# Patient Record
Sex: FEMALE | Race: WHITE | NOT HISPANIC OR LATINO | Employment: FULL TIME | URBAN - NONMETROPOLITAN AREA
[De-identification: names, ages, dates, MRNs, and addresses within clinical notes are randomized per-mention and may not be internally consistent; named-entity substitution may affect disease eponyms.]

---

## 2017-01-04 ENCOUNTER — AMBULATORY - GICH (OUTPATIENT)
Dept: FAMILY MEDICINE | Facility: OTHER | Age: 47
End: 2017-01-04

## 2017-02-27 ENCOUNTER — AMBULATORY - GICH (OUTPATIENT)
Dept: SCHEDULING | Facility: OTHER | Age: 47
End: 2017-02-27

## 2017-04-03 ENCOUNTER — COMMUNICATION - GICH (OUTPATIENT)
Dept: FAMILY MEDICINE | Facility: OTHER | Age: 47
End: 2017-04-03

## 2017-04-03 DIAGNOSIS — E78.2 MIXED HYPERLIPIDEMIA: ICD-10-CM

## 2017-04-25 ENCOUNTER — AMBULATORY - GICH (OUTPATIENT)
Dept: SCHEDULING | Facility: OTHER | Age: 47
End: 2017-04-25

## 2017-04-25 ENCOUNTER — AMBULATORY - GICH (OUTPATIENT)
Dept: FAMILY MEDICINE | Facility: OTHER | Age: 47
End: 2017-04-25

## 2017-04-25 ENCOUNTER — HISTORY (OUTPATIENT)
Dept: FAMILY MEDICINE | Facility: OTHER | Age: 47
End: 2017-04-25

## 2017-04-25 DIAGNOSIS — E06.3 AUTOIMMUNE THYROIDITIS: ICD-10-CM

## 2017-04-25 DIAGNOSIS — Z12.31 ENCOUNTER FOR SCREENING MAMMOGRAM FOR MALIGNANT NEOPLASM OF BREAST: ICD-10-CM

## 2017-04-25 DIAGNOSIS — E10.9 TYPE 1 DIABETES MELLITUS WITHOUT COMPLICATIONS (H): ICD-10-CM

## 2017-04-25 DIAGNOSIS — F34.1 DYSTHYMIC DISORDER: ICD-10-CM

## 2017-04-25 DIAGNOSIS — E78.2 MIXED HYPERLIPIDEMIA: ICD-10-CM

## 2017-04-25 DIAGNOSIS — D50.9 IRON DEFICIENCY ANEMIA: ICD-10-CM

## 2017-04-25 DIAGNOSIS — Z23 ENCOUNTER FOR IMMUNIZATION: ICD-10-CM

## 2017-04-25 LAB
A/G RATIO - HISTORICAL: 2.2 (ref 1–2)
ABSOLUTE BASOPHILS - HISTORICAL: 0.1 THOU/CU MM
ABSOLUTE EOSINOPHILS - HISTORICAL: 0.4 THOU/CU MM
ABSOLUTE LYMPHOCYTES - HISTORICAL: 1.7 THOU/CU MM (ref 0.9–2.9)
ABSOLUTE MONOCYTES - HISTORICAL: 0.4 THOU/CU MM
ABSOLUTE NEUTROPHILS - HISTORICAL: 2.9 THOU/CU MM (ref 1.7–7)
ALBUMIN SERPL-MCNC: 4 G/DL (ref 3.5–5.7)
ALP SERPL-CCNC: 52 IU/L (ref 34–104)
ALT (SGPT) - HISTORICAL: 13 IU/L (ref 7–52)
ANION GAP - HISTORICAL: 8 (ref 5–18)
AST SERPL-CCNC: 15 IU/L (ref 13–39)
BASOPHILS # BLD AUTO: 1.8 %
BILIRUB SERPL-MCNC: 0.5 MG/DL (ref 0.3–1)
BUN SERPL-MCNC: 11 MG/DL (ref 7–25)
BUN/CREAT RATIO - HISTORICAL: 15
CALCIUM SERPL-MCNC: 9 MG/DL (ref 8.6–10.3)
CHLORIDE SERPLBLD-SCNC: 102 MMOL/L (ref 98–107)
CO2 SERPL-SCNC: 27 MMOL/L (ref 21–31)
CREAT SERPL-MCNC: 0.74 MG/DL (ref 0.7–1.3)
EOSINOPHIL NFR BLD AUTO: 6.9 %
ERYTHROCYTE [DISTWIDTH] IN BLOOD BY AUTOMATED COUNT: 11.3 % (ref 11.5–15.5)
GFR IF NOT AFRICAN AMERICAN - HISTORICAL: >60 ML/MIN/1.73M2
GLOBULIN - HISTORICAL: 1.8 G/DL (ref 2–3.7)
GLUCOSE SERPL-MCNC: 168 MG/DL (ref 70–105)
HCT VFR BLD AUTO: 37.3 % (ref 33–51)
HEMOGLOBIN: 12.9 G/DL (ref 12–16)
LYMPHOCYTES NFR BLD AUTO: 30.7 % (ref 20–44)
MCH RBC QN AUTO: 33.1 PG (ref 26–34)
MCHC RBC AUTO-ENTMCNC: 34.6 G/DL (ref 32–36)
MCV RBC AUTO: 96 FL (ref 80–100)
MONOCYTES NFR BLD AUTO: 7.6 %
NEUTROPHILS NFR BLD AUTO: 53.1 % (ref 42–72)
PLATELET # BLD AUTO: 214 THOU/CU MM (ref 140–440)
PMV BLD: 7.4 FL (ref 6.5–11)
POTASSIUM SERPL-SCNC: 4 MMOL/L (ref 3.5–5.1)
PROT SERPL-MCNC: 5.8 G/DL (ref 6.4–8.9)
RED BLOOD COUNT - HISTORICAL: 3.9 MIL/CU MM (ref 4–5.2)
SODIUM SERPL-SCNC: 137 MMOL/L (ref 133–143)
WHITE BLOOD COUNT - HISTORICAL: 5.5 THOU/CU MM (ref 4.5–11)

## 2017-04-26 ENCOUNTER — AMBULATORY - GICH (OUTPATIENT)
Dept: SCHEDULING | Facility: OTHER | Age: 47
End: 2017-04-26

## 2017-04-26 LAB
ALB RAND URINE - HISTORICAL: 6.2 MG/L
CREATININE, URINE - HISTORICAL: 0.61 G/L
MICROALBUMIN, RAND UR - HISTORICAL: 10.2 MG/G CREAT

## 2017-04-28 ENCOUNTER — AMBULATORY - GICH (OUTPATIENT)
Dept: FAMILY MEDICINE | Facility: OTHER | Age: 47
End: 2017-04-28

## 2017-04-28 LAB
ESTIMATED AVERAGE GLUCOSE: NORMAL MG/DL
HEMOGLOBIN A1C MONITORING (POCT) - HISTORICAL: NORMAL % (ref 4–6.2)

## 2017-05-16 ENCOUNTER — HOSPITAL ENCOUNTER (OUTPATIENT)
Dept: RADIOLOGY | Facility: OTHER | Age: 47
End: 2017-05-16
Attending: FAMILY MEDICINE

## 2017-05-16 ENCOUNTER — HISTORY (OUTPATIENT)
Dept: RADIOLOGY | Facility: OTHER | Age: 47
End: 2017-05-16

## 2017-05-16 DIAGNOSIS — Z12.31 ENCOUNTER FOR SCREENING MAMMOGRAM FOR MALIGNANT NEOPLASM OF BREAST: ICD-10-CM

## 2017-05-17 ENCOUNTER — COMMUNICATION - GICH (OUTPATIENT)
Dept: FAMILY MEDICINE | Facility: OTHER | Age: 47
End: 2017-05-17

## 2017-05-17 DIAGNOSIS — E10.9 TYPE 1 DIABETES MELLITUS WITHOUT COMPLICATIONS (H): ICD-10-CM

## 2017-05-18 ENCOUNTER — AMBULATORY - GICH (OUTPATIENT)
Dept: SCHEDULING | Facility: OTHER | Age: 47
End: 2017-05-18

## 2017-05-25 ENCOUNTER — COMMUNICATION - GICH (OUTPATIENT)
Dept: FAMILY MEDICINE | Facility: OTHER | Age: 47
End: 2017-05-25

## 2017-05-25 DIAGNOSIS — E10.9 TYPE 1 DIABETES MELLITUS WITHOUT COMPLICATIONS (H): ICD-10-CM

## 2017-06-20 ENCOUNTER — AMBULATORY - GICH (OUTPATIENT)
Dept: SCHEDULING | Facility: OTHER | Age: 47
End: 2017-06-20

## 2017-10-26 ENCOUNTER — OFFICE VISIT - GICH (OUTPATIENT)
Dept: FAMILY MEDICINE | Facility: OTHER | Age: 47
End: 2017-10-26

## 2017-10-26 ENCOUNTER — HISTORY (OUTPATIENT)
Dept: FAMILY MEDICINE | Facility: OTHER | Age: 47
End: 2017-10-26

## 2017-10-26 DIAGNOSIS — E10.9 TYPE 1 DIABETES MELLITUS WITHOUT COMPLICATIONS (H): ICD-10-CM

## 2017-10-26 DIAGNOSIS — E78.00 PURE HYPERCHOLESTEROLEMIA: ICD-10-CM

## 2017-10-26 DIAGNOSIS — E06.3 AUTOIMMUNE THYROIDITIS: ICD-10-CM

## 2017-10-26 DIAGNOSIS — R30.0 DYSURIA: ICD-10-CM

## 2017-10-26 LAB
BACTERIA URINE: ABNORMAL BACTERIA/HPF
BILIRUB UR QL: NEGATIVE
CLARITY, URINE: ABNORMAL CLARITY
COLOR UR: YELLOW COLOR
EPITHELIAL CELLS: ABNORMAL EPI/HPF
GLUCOSE URINE: NEGATIVE MG/DL
KETONES UR QL: NEGATIVE MG/DL
LEUKOCYTE ESTERASE URINE: ABNORMAL
NITRITE UR QL STRIP: POSITIVE
OCCULT BLOOD,URINE - HISTORICAL: ABNORMAL
PH UR: 6 [PH]
PROTEIN QUALITATIVE,URINE - HISTORICAL: NEGATIVE MG/DL
RBC - HISTORICAL: ABNORMAL /HPF
RENAL EPITHELIAL CELLS - HISTORICAL: ABNORMAL
SP GR UR STRIP: 1.01
UROBILINOGEN,QUALITATIVE - HISTORICAL: NORMAL EU/DL
WBC - HISTORICAL: ABNORMAL /HPF

## 2017-11-03 ENCOUNTER — COMMUNICATION - GICH (OUTPATIENT)
Dept: FAMILY MEDICINE | Facility: OTHER | Age: 47
End: 2017-11-03

## 2017-11-03 DIAGNOSIS — F32.9 MAJOR DEPRESSIVE DISORDER, SINGLE EPISODE: ICD-10-CM

## 2017-12-04 ENCOUNTER — COMMUNICATION - GICH (OUTPATIENT)
Dept: FAMILY MEDICINE | Facility: OTHER | Age: 47
End: 2017-12-04

## 2017-12-04 DIAGNOSIS — E03.9 HYPOTHYROIDISM: ICD-10-CM

## 2017-12-28 NOTE — PATIENT INSTRUCTIONS
Patient Information     Patient Name MRN Sex Floresita Florentino [id number] Female 1970      Patient Instructions by Mary Denson MD at 10/26/2017 10:00 AM     Author:  Mary Denson MD  Service:  (none) Author Type:  Physician     Filed:  10/26/2017 11:08 AM  Encounter Date:  10/26/2017 Status:  Addendum     :  Mary Denson MD (Physician)        Related Notes: Original Note by Mary Denson MD (Physician) filed at 10/26/2017 11:04 AM            1.  Bactrim antibiotic  2.  Drink plenty of fluid  3.  Stop simvastatin;  Recheck lipids in a month  Diabetic labs in a month

## 2017-12-28 NOTE — PROGRESS NOTES
"Patient Information     Patient Name MRN Sex Floresita Florentino [id number] Female 1970      Progress Notes by Mary Denson MD at 10/26/2017 10:00 AM     Author:  Mary Denson MD Service:  (none) Author Type:  Physician     Filed:  10/26/2017  1:02 PM Encounter Date:  10/26/2017 Status:  Signed     :  Mary Denson MD (Physician)            Nursing Notes:   Madhuri Rice  10/26/2017 10:57 AM  Signed  Patient presents for UTI symptoms of frequency and urgency.   Madhuri Rice LPN........................10/26/2017  10:13 AM         SUBJECTIVE: Floresita Hui is a 47 y.o. female who complains of urinary frequency, urgency and dysuria x  2 days, without flank pain, fever, chills, or abnormal vaginal discharge or bleeding.     Allergies      Allergen   Reactions     Codeine  *Unknown     Loses consciousness        /70  Pulse 68  Temp 97  F (36.1  C) (Tympanic)   Ht 1.676 m (5' 6\")  Wt 73 kg (161 lb)  Breastfeeding? No  BMI 25.99 kg/m2    OBJECTIVE: Appears well, in no apparent distress.  The abdomen is soft without tenderness, guarding, mass, rebound or organomegaly. No CVA tenderness or inguinal adenopathy noted.      Results for orders placed or performed in visit on 10/26/17      URINALYSIS W REFLEX MICROSCOPIC IF POSITIVE      Result  Value Ref Range    COLOR                     Yellow Yellow Color    CLARITY                   Slightly Cloudy (A) Clear Clarity    SPECIFIC GRAVITY,URINE    1.015 1.010, 1.015, 1.020, 1.025                    PH,URINE                  6.0 6.0, 7.0, 8.0, 5.5, 6.5, 7.5, 8.5                    UROBILINOGEN,QUALITATIVE  Normal Normal EU/dl    PROTEIN, URINE Negative Negative mg/dL    GLUCOSE, URINE Negative Negative mg/dL    KETONES,URINE             Negative Negative mg/dL    BILIRUBIN,URINE           Negative Negative                    OCCULT BLOOD,URINE        Moderate (A) Negative              "       NITRITE                   Positive (A) Negative                    LEUKOCYTE ESTERASE        Moderate (A) Negative                   URINALYSIS MICROSCOPIC      Result  Value Ref Range    RBC None Seen 0-2, None Seen /HPF    WBC 11-25 (A) 0-2, 3-5, None Seen /HPF    BACTERIA                  Many (A) None Seen, Rare, Occasional, Few Bacteria/HPF    EPITHELIAL CELLS          Moderate (A) None Seen, Few Epi/HPF    RENAL EPITHELIAL CELLS Few None Seen, Few       ASSESSMENT: UTI uncomplicated without evidence of pyelonephritis    PLAN: Treatment per orders - also push fluids, may use Pyridium OTC prn. Call or return to clinic prn if these symptoms worsen or fail to improve as anticipated.  1.  Bactrim antibiotic  2.  Drink plenty of fluid  3.  Stop simvastatin;  Recheck lipids in a month  Diabetic labs in a month

## 2017-12-28 NOTE — TELEPHONE ENCOUNTER
Patient Information     Patient Name MRN Floresita Nazario [id number] Female 1970      Telephone Encounter by Carolynn Vazquez RN at 2017  9:01 AM     Author:  Carolynn Vazquez RN Service:  (none) Author Type:  NURS- Registered Nurse     Filed:  2017  9:10 AM Encounter Date:  11/3/2017 Status:  Signed     :  Carolynn Vazquez RN (NURS- Registered Nurse)            Depression-in adults 18 and over  SSRI    Office visit in the past 12 months or as indicated in chart.  Should have clinic visit 1-2 months after initial prescription.    Last visit with JOSE R BLAKE was on: 10/26/2017 in [facility]  Next visit with JOSE R BLAKE is on: No future appointment listed with this provider  Next visit with Family Practice is on: No future appointment listed in this department    Max refills 12 months from last office visit or per providers notes.    Patient is due for medication management appointment. Limited refill provided at this time. [employer] message and/or letter sent for reminder to patient. Prescription refilled per RN Medication Refill Policy.................... Carolynn Vazquez RN ....................  2017   9:09 AM

## 2017-12-30 NOTE — NURSING NOTE
Patient Information     Patient Name MRN Floresita Nazario [id number] Female 1970      Nursing Note by Madhuri Rice at 10/26/2017 10:00 AM     Author:  Madhuri Rice Service:  (none) Author Type:  (none)     Filed:  10/26/2017 10:57 AM Encounter Date:  10/26/2017 Status:  Signed     :  Madhuri Rice            Patient presents for UTI symptoms of frequency and urgency.   Madhuri Rice LPN........................10/26/2017  10:13 AM

## 2018-01-04 NOTE — NURSING NOTE
Patient Information     Patient Name MRN Sex Floresiat Florentino [id number] Female 1970      Nursing Note by Latrice Stringer at 2017 12:45 PM     Author:  Latrice Stringer  Service:  (none) Author Type:  (none)     Filed:  2017  2:09 PM  Encounter Date:  2017 Status:  Addendum     :  Vanita Troy        Related Notes: Original Note by Latrice Stringer filed at 2017  1:10 PM            This patient presents today for a Preoperative exam for this procedure: right eye Cataract surgery   Date of Surgery: 17 & 17   Surgeon:  Dr. Fink  Facility:  [facility]  Fax:  [phone]  Latrice Stringer LPN ....................  2017   12:51 PM

## 2018-01-04 NOTE — TELEPHONE ENCOUNTER
Patient Information     Patient Name MRN Floresita Nazario [id number] Female 1970      Telephone Encounter by Crystal Balderas RN at 2017  9:21 AM     Author:  Crystal Balderas RN Service:  (none) Author Type:  (none)     Filed:  2017  9:46 AM Encounter Date:  4/3/2017 Status:  Signed     :  Crystal Balderas RN (NURS- Registered Nurse)            Statins    Office visit in the past 12 months.    Last visit with JOSE R BLAKE was on: 2016 in [facility]  Next visit with JOSE R BLAKE is on: No future appointment listed with this provider  Next visit with Family Practice is on: No future appointment listed in this department    Lab testing requirements:  Lipids annually.  Repeat lipids 6-8 weeks after dosage or drug change.    Last Lipids:  Chol: 238    2016  T    2016  HDL:   68    2016  LDL:  159    2016  LDL DIRECT:  No results found in past 5 years    .    Concommitant use of fibrates and statins-If it is an addition to the medication list, review note and/or discuss with provider.  If already on medication list, refill.    Max refills 12 months from last office visit.      Prescription refilled per RN Medication Refill Policy.................... Crystal Balderas ....................  2017   9:21 AM

## 2018-01-04 NOTE — PROGRESS NOTES
Patient Information     Patient Name MRN Sex Floresita Florentino [id number] Female 1970      Progress Notes by Mary Denson MD at 2017 12:45 PM     Author:  Mary Denson MD Service:  (none) Author Type:  Physician     Filed:  2017  6:23 PM Encounter Date:  2017 Status:  Signed     :  Mary Denson MD (Physician)            .

## 2018-01-04 NOTE — H&P
Patient Information     Patient Name MRN Floresita Nazario [id number] Female 1970      H&P by Mary Denson MD at 2017 12:45 PM     Author:  Mary Denson MD Service:  (none) Author Type:  Physician     Filed:  2017  6:23 PM Encounter Date:  2017 Status:  Signed     :  Mary Denson MD (Physician)            Nursing Notes:   Latrice Stringer  2017  1:10 PM  Addendum  This patient presents today for a Preoperative exam for this procedure: right eye Cataract surgery   Date of Surgery: 17 & 17   Surgeon:  Dr. Fink  Facility:  [facility]  Fax:  [phone]  Latrice Stringer LPN ....................  2017   12:51 PM        Vanita Troy  2017  2:10 PM  Signed  This patient presents today for a Preoperative exam for this procedure: Left eye cataract surgery   Date of Surgery: 17 & 17   Surgeon:  Dr. Fink  [facility]   Fax:  [phone]          PREOPERATIVE Anesthesia Medical Evaluation  Date of Exam: 2017    Nursing Notes:   Latrice Stringer  2017  1:10 PM  Addendum  This patient presents today for a Preoperative exam for this procedure: right eye Cataract surgery   Date of Surgery: 17 & 17   Surgeon:  Dr. Fink  Facility:  [facility]  Fax:  [phone]  Latrice Stringer LPN ....................  2017   12:51 PM        Vanita Troy  2017  2:10 PM  Signed  This patient presents today for a Preoperative exam for this procedure: Left eye cataract surgery   Date of Surgery: 17 & 17   Surgeon:  Dr. Fink  [facility]   Fax:  [phone]           HPI:  Patient is here at the request of Dr Fink prior to undergoing bilateral  Cataract surgery on 17 and 17 at two separate facilitie noted above.  Patient is unable to work secondary to her cataracts. She understands the risks of cataract surgery and wishes to proceed.      Mixed hyperlipidemia       recent lipid panel on March 14, 2017 showed a total cholesterol 210  triglycerides 65 and LDL 96l;  She takes zocor 20 mg at hs. she denies any chest pain palpitations shortness of breath. No significant myalgias        Type 1 diabetes mellitus without complication (HCC)    Patient is treated on insulin pump.  She takes a BASA a day .   She had an A1c drawn and [place] and was 7.2  With a creatinine of 0.68 on March 14, 2017 she denies any excessive thirst hunger or urination. Denies any troubles with her hearing. Visual problems secondary to cataract as noted above. Denies any numbness or tingling in her extremities.        Hashimoto's thyroiditis   TSH checked on 3/14/17 and was within normal limits;  She takes synthroid 100 mcg one a day.  She's not having any excessive dry skin. Denies any changes in skin or hair or nails.     DYSTHYMIA, CHRONIC  On Prozac and doing well. Wishes to continue denies any depressive symptoms      Iron deficiency anemia   History of iron deficiency.   Not on iron.  Denies any lightheadedness.      Proposed anesthesia: local with sedation   Anesthesia Complications: none  History of abnormal bleeding : none   History of Blood Transfusions: none      Cards risk factors:     History    Smoking Status      Never Smoker   Smokeless Tobacco      Never Used   ,   LDL CHOLESTEROL (mg/dL)    Date Value   08/16/2016 159 (H)   ,   BP Readings from Last 1 Encounters:    04/25/17 132/84     No early heart disease     CPR: yes  Allergies: Codeine   Latex Allergy: no  Immunization History     Administered  Date(s) Administered     Influenza Virus, Unspecified 10/11/2011, 10/13/2013     Influenza, IIV3 (Age >=3 years) 09/23/2014, 09/29/2015     Influenza, IIV4 (Age >= 3 Years) 09/14/2016         Preoperative Evaluation: Obstructive Sleep Apnea screening    S: Snore -  Do you snore loudly? (louder than talking or loud enough to be heard through closed doors)(NO)  T: Tired - Do you often feel  "tired, fatigued, or sleepy during the daytime?( YES )  O: Observed - Has anyone ever observed you stop breathing during your sleep?( NO)  P: Pressure - Do you have or are you being treated for high blood pressure?(NO)  B: BMI - BMI greater than 35kg/m2?(NO)  A: Age - Age over 50 years old?(NO)  N: Neck - Neck circumference greater than 40 cm?(NO)  G: Gender - Gender: Male?(NO)  Total number of \"YES\" responses:  YES    Scoring: Low risk of SHAHID 0-2  At Risk of SHAHID: >3 High Risk of SHAHID: 5-8      Problem List:   Patient Active Problem List     Diagnosis  Code     DYSTHYMIA, CHRONIC F34.1     Mixed hyperlipidemia E78.2     Microcytic anemia D50.9     Iron deficiency anemia, unspecified D50.9     IUD (intrauterine device) in place Z97.5     Hashimoto's thyroiditis E06.3     Type 1 diabetes mellitus without complication (HC) E10.9      Histories:  Past Medical History:     Diagnosis  Date     Depression      Diabetes mellitus type I (HC) Dx at 10 years old    Uses Medtronic Insulin pump.      DYSTHYMIA, CHRONIC 10/11/2011     Encounter for insertion of mirena IUD 1/17/13    placed by Dr. Carrillo.       Iron deficiency anemia, unspecified 11/16/2012     Microcytic anemia 11/16/2012     Mixed hyperlipidemia 11/16/2012      Past Surgical History:      Procedure  Laterality Date     DILATION AND CURETTAGE      x3       TMJ ARTHOTOMY      hx of TMJ surgery        Social History     Social History        Marital status:       Spouse name: N/A     Number of children:  2     Years of education:  N/A     Occupational History       [employer]     Social History Main Topics          Smoking status:   Never Smoker      Smokeless tobacco:   Never Used      Alcohol use   1.2 oz/week     2 Standard drinks or equivalent per week        Comment: Glass of wine daily       Drug use:   No      Sexual activity:   Yes      Partners:  Male      Other Topics   Concern      Service  No     Blood " Transfusions  No     Caffeine Concern  Yes     2 + caffeine       Occupational Exposure  Yes     works in human resources.       Hobby Hazards  No     Sleep Concern  No     Stress Concern  Yes     Weight Concern  No     Special Diet  Yes     diabetic       Back Care  No     Bike Helmet  Yes     Seat Belt  Yes     Social History Narrative     , moved here from [place].   Sense of humor intact.      works in [employer] as .  [place] - .  2 kids.    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      No routine exercise program.  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    Patient states she is safe in her home.    <       Obstetric History       T0      L2     SAB0   TAB0   Ectopic0   Multiple0   Live Births0      Family History       Problem   Relation Age of Onset     Cancer-breast  Maternal Aunt      Other  Mother      Myesthenia gravis       Other  Mother      Rare kidney disease, on transplant list       Psychiatric illness  Sister      Bipolar       Thyroid Disease  Sister      Other  Sister      Migraines          Current Medications:  Current Outpatient Rx       Medication  Sig Dispense Refill     ACCU-CHEK JESICA PLUS TEST STRP strip USE ONE STRIP TO CHECK GLUCOSE FIVE TIMES DAILY. 500 Each 3     aspirin enteric coated 81 mg tablet Take 1 tablet by mouth once daily with a meal.  0     blood-glucose meter (ACCU-CHEK ADVANTAGE DIABETES) Dispense glucose meter, test strips and lancets covered by the patient insurance. Test 5 times per day. 3 month supply with 4 refills. 1 Device 0     cholecalciferol (VITAMIN D) 1,000 unit tablet Take 1 tablet by mouth once daily.  0     clotrimazole-betamethasone cream (LOTRISONE) 1-0.05 % cream Apply  topically to affected area(s) 2 times daily. Generic okay 15 g 0     cyclobenzaprine (FLEXERIL) 5 mg tablet Take one tablet q hs prn muscle spasm 30 tablet 1     FLUoxetine (PROZAC) 20 mg capsule Take 1 capsule by mouth every  "morning. 90 capsule 4              HUMALOG 100 unit/mL injection USE VIA PUMP 60 mL 0     insulin glargine (LANTUS) 100 unit/mL injection Inject 20 Units subcutaneous before bedtime only if pump fails  10 mL 0     insulin lispro (HUMALOG) 100 unit/mL injection  per pump  10 mL 0     insulin syringe-needle u-100 (B-D INS SYR ULTRAFINE .5CC/29G) 1/2 mL 29 x 1/2\" For administering insulin at home. 1 box 0     levonorgestrel intrauterine device (MIRENA) 20 mcg/24 hr (5 years) IUD Placed May 2013 1 Device 0     levothyroxine (SYNTHROID) 100 mcg tablet Take 1 tablet by mouth before breakfast.   90 tablet 3     simvastatin (ZOCOR) 20 mg tablet TAKE ONE TABLET BY MOUTH ONCE DAILY AT BEDTIME. 90 tablet 1               Medications have been reviewed by me and are current to the best of my knowledge and ability.    Recent use of: aspirin    HABITS:     Health Care Directive or Living Will: no    REVIEW OF SYSTEMS:  Fever/Chills or other infectious symptoms in past month:  (NO)   >10lb weight loss in past two months:  (NO)   PHQ Depression Screen     Over the last 2 weeks, how often have you been bothered by any of the following problems?        ROS: No TIA's or unusual headaches, no dysphagia.  No prolonged cough. No dyspnea or chest pain on exertion.  No abdominal pain, change in bowel habits, black or bloody stools.  No urinary tract symptoms.  No new or unusual musculoskeletal symptoms.  Decreased menses secondary to mirena, monogamous, no abnormal vaginal bleeding, discharge or unexpected pelvic pain. No new breast lumps, breast pain or nipple discharge.    Has Mirena        EXAM:   /84  Pulse 60  Temp 96.9  F (36.1  C) (Tympanic)   Ht 1.665 m (5' 5.55\")  Wt 73 kg (161 lb)  SpO2 98%  BMI 26.34 kg/m2 Body mass index is 26.34 kg/(m^2).  Patient appears well, alert and oriented x 3, pleasant, cooperative.  KEVIN. TM's clear, Oral pharynx with good dentition, without lesion, erythema or exudate. Moist mucous " membranes. Neck supple and free of adenopathy, or masses. No thyromegaly. Lungs are clear, without wheezes, rhonchi or rales. Heart sounds are normal, no murmurs, clicks, gallops or rubs. Abdomen is soft, no tenderness, masses or organomegaly. No CVAT.  Wearing insulin pump. Extremities are without edema. Peripheral pulses are normal. Screening neurological exam is normal without focal findings. Skin is normal without suspicious lesions noted.      DIAGNOSTICS:   1. EKG:  NSR rate 62    2. CXR:  Not needed     3. Labs:     Results for orders placed or performed in visit on 04/25/17      HEMOGLOBIN A1C MONITORING (POCT)      Result  Value Ref Range    HEMOGLOBIN A1C MONITORING (POCT) 7.0 (H) 4.0 - 6.2 %    ESTIMATED AVERAGE GLUCOSE  154 mg/dL   COMP METABOLIC PANEL      Result  Value Ref Range    SODIUM 137 133 - 143 mmol/L    POTASSIUM 4.0 3.5 - 5.1 mmol/L    CHLORIDE 102 98 - 107 mmol/L    CO2,TOTAL 27 21 - 31 mmol/L    ANION GAP 8 5 - 18                    GLUCOSE 168 (H) 70 - 105 mg/dL    CALCIUM 9.0 8.6 - 10.3 mg/dL    BUN 11 7 - 25 mg/dL    CREATININE 0.74 0.70 - 1.30 mg/dL    BUN/CREAT RATIO           15                    GFR if African American >60 >60 ml/min/1.73m2    GFR if not African American >60 >60 ml/min/1.73m2    ALBUMIN 4.0 3.5 - 5.7 g/dL    PROTEIN,TOTAL 5.8 (L) 6.4 - 8.9 g/dL    GLOBULIN                  1.8 (L) 2.0 - 3.7 g/dL    A/G RATIO 2.2 (H) 1.0 - 2.0                    BILIRUBIN,TOTAL 0.5 0.3 - 1.0 mg/dL    ALK PHOSPHATASE 52 34 - 104 IU/L    ALT (SGPT) 13 7 - 52 IU/L    AST (SGOT) 15 13 - 39 IU/L   CBC WITH AUTO DIFFERENTIAL      Result  Value Ref Range    WHITE BLOOD COUNT         5.5 4.5 - 11.0 thou/cu mm    RED BLOOD COUNT           3.90 (L) 4.00 - 5.20 mil/cu mm    HEMOGLOBIN                12.9 12.0 - 16.0 g/dL    HEMATOCRIT                37.3 33.0 - 51.0 %    MCV                       96 80 - 100 fL    MCH                       33.1 26.0 - 34.0pg    MCHC                      34.6  32.0 - 36.0 g/dL    RDW                       11.3 (L) 11.5 - 15.5 %    PLATELET COUNT            214 140 - 440 thou/cu mm    MPV                       7.4 6.5 - 11.0 fL    NEUTROPHILS               53.1 42.0 - 72.0 %    LYMPHOCYTES               30.7 20.0 - 44.0 %    MONOCYTES                 7.6 <12.0 %    EOSINOPHILS               6.9 <8.0 %    BASOPHILS                 1.8 <3.0 %    ABSOLUTE NEUTROPHILS      2.9 1.7 - 7.0 thou/cu mm    ABSOLUTE LYMPHOCYTES      1.7 0.9 - 2.9 thou/cu mm    ABSOLUTE MONOCYTES        0.4 <0.9 thou/cu mm    ABSOLUTE EOSINOPHILS      0.4 <0.5 thou/cu mm    ABSOLUTE BASOPHILS        0.1 <0.3 thou/cu mm       4. Pre-op urine for pregnancy (for 12 yrs and older or menstruating): mirena     IMPRESSION:   Satisfactory Preoperative Anesthesia Medical Evaluation;    For above listed surgery and anesthesia:   Patient is low risk for perioperative complications.    Patient is on chronic pain medications (NO);   Patient is on antiplatlet/anticoagulation (YES)  Other medications that need adjustment perioperatively (NO)        Patient may proceed with surgery with appropriate anesthesia.  Labs/ Ekg to be faxed to surgeon's office.   Patient informed NPO after midnight night prior to surgery, to avoid NSAIDS, ASA, fish oil, and flax seed  over the counter ten days prior to surgery.   If  febrile illness or productive cough, please contact the surgeon's office.       I appreciate in advance compassionate care of this patient by Dr. Fink and  his surgical staff.         Mixed hyperlipidemia   doing well on zocor 20 mg at hs       Hashimoto's thyroiditis  Doing well on synthroid.       DYSTHYMIA, CHRONIC   continue prozac 20 mg       Iron deficiency anemia, unspecified iron deficiency anemia type   resolved      Type 1 diabetes mellitus without complication (HC)   continue insulin pump per endocrinology ;  Given no general anesthesia no need to decrease insulin

## 2018-01-04 NOTE — NURSING NOTE
Patient Information     Patient Name MRN Sex Floresita Florentino [id number] Female 1970      Nursing Note by Vanita Troy at 2017 12:45 PM     Author:  Vanita Troy Service:  (none) Author Type:  (none)     Filed:  2017  2:10 PM Encounter Date:  2017 Status:  Signed     :  Vanita Troy            This patient presents today for a Preoperative exam for this procedure: Left eye cataract surgery   Date of Surgery: 17 & 17   Surgeon:  Dr. Fink  [facility]   Fax:  [phone]

## 2018-01-04 NOTE — PATIENT INSTRUCTIONS
Patient Information     Patient Name MRN Floresita Nazario [id number] Female 1970      Patient Instructions by Mary Denson MD at 2017  1:54 PM     Author:  Mary Denson MD  Service:  (none) Author Type:  Physician     Filed:  2017  1:54 PM  Encounter Date:  2017 Status:  Addendum     :  Mary Denson MD (Physician)        Related Notes: Original Note by Mary Denson MD (Physician) filed at 2017  1:54 PM            RECOMMENDATIONS:   Nothing to eat after midnight night prior to surgery.  Avoid Aspirin,  NSAIDS (nonsteroidal anti-inflammatory medications) like ibuprofen or aleve over the counter ten days prior to surgery.   It is okay to use tylenol with maximum tylenol use 3 grams in 24 hours.  If you have a febrile illness or productive cough, please contact your surgeon's office.      Stop aspirin.    Avoid fish oil    Drops per Dr. Fink    Lid hygiene:  Abhijeet and Abhijeet's , pillow case, throw make up .      Index [place] Related topics   Cataract Surgery   ________________________________________________________________________  KEY POINTS    Cataract surgery is a procedure in which a provider removes a clouded lens from the eye and replaces it with an artificial lens. A cataract is a cloudy lens inside the eye behind the iris (the colored part of the eye). This cloudy area can cause trouble seeing.    Cataract surgery is recommended when a cloudy area in the lens of the eye causes vision problems.    Ask your provider how long it will take to recover and how to take care of yourself at home.    Make sure you know what symptoms or problems you should watch for and what to do if you have them.  ________________________________________________________________________  What is a cataract surgery?   Cataract surgery is a procedure in which a provider removes a clouded lens from the eye and replaces it with an  artificial lens. A cataract is a cloudy area in the lens of the eye. The lens is located inside the eye behind the iris (the colored part of the eye). It helps focus light and images on the lining of the back of your eye. If the lens gets cloudy, it can cause trouble seeing.  When is it used?   Cataract surgery is recommended when a cloudy area in the lens of the eye causes vision problems. The benefit of this procedure is that you can regain clear vision if the rest of your eye is normal.  Ask your healthcare provider about your choices for treatment and the risks.  How do I prepare for this procedure?     Plan for your care and a ride home after the procedure.    You may or may not need to take your regular medicines the day of the procedure. Tell your healthcare provider about all medicines and supplements that you take. Some products may increase your risk of side effects. Ask your healthcare provider if you need to avoid taking any medicine or supplements before the procedure.    Your healthcare provider will tell you when to stop eating and drinking before the procedure. This helps to keep you from vomiting during the procedure.    Do not wear eye makeup on the day of the surgery.    Follow any other instructions your healthcare provider gives you.    Ask any questions you have before the procedure. You should understand what your healthcare provider is going to do. You have the right to make decisions about your healthcare and to give permission for any tests or procedures.  What happens during the procedure?   You will be given a local or general anesthetic to keep you from feeling pain during the operation. A local anesthetic numbs your eye while you remain awake. The local anesthetic can be given to you with drops or ointment or with a shot of medicine behind the eye. General anesthesia relaxes your muscles and you will be asleep. Most surgery is done with local anesthesia and a sedative to help you  relax.  There are different ways to break up the lens:    Phacoemulsification: The provider will make a small cut in your eye and use sound waves (ultrasound) to break the lens into small pieces. The small pieces of the old lens are then removed with a tiny vacuum    Femtosecond laser: The provider uses a special laser to make a small cut in your eye and help break the lens into small pieces. Ultrasound may still be used.    Nuclear expression: The lens is removed in one piece. This approach is used rarely, only if your cataract can't be broken up by phacoemulsification.  After the lens is removed, the provider will put an artificial lens in your eye. The provider may put one or more stitches in your eye to close the cut. You will have a protective shield and a patch over the eye.  What happens after the procedure?   You will be in the recovery area after surgery until you are ready to go home. It's normal to feel itching, sticky eyelids, and mild discomfort for a short time after cataract surgery. After 1 to 2 days, the discomfort should stop. Some fluid discharge is also common.  You can read and watch TV almost right away, but your vision may be blurry at first. You can do simple tasks such as ride in a car, get dressed, cook, and visit friends. You should not drive a car the day of surgery. To protect your eye from injury, cover the eye at all times with sunglasses, glasses, or a special eye shield while your eye is healing.  You need to use eye drops or pills to help healing, prevent infection, or to control the pressure in your eye. Since you may have several different drops to use, be sure you have a written schedule to follow to avoid confusion.  Ask your healthcare provider:    How long it will take to recover    If there are activities you should avoid    How to take care of yourself at home and when you can return to your normal activities    What symptoms or problems you should watch for and what to do  if you have them  Make sure you know when you should come back for a checkup. Keep all appointments for provider visits or tests.  What are the risks of this procedure?   Every procedure or treatment has risks. Some possible risks of this procedure include:    Problems with anesthesia    Infection, bleeding, or blood clots    Drooping eyelid    Double vision    Glaucoma (damage to the optic nerve usually caused by high pressure inside the eye)    Retinal tear or detachment    Need for additional surgery    Decreased or loss of vision (rare)    A cloudy film that may form on the covering of the plastic lens implant. This problem is easily fixed with a quick, painless laser procedure that is usually done in your provider s office.  Ask your healthcare provider how these risks apply to you. Be sure to discuss any other questions or concerns that you may have.  Reviewed for medical accuracy by faculty at the [facility] at [facility]. Web site: [email or web address]  Developed by [employer].  Adult Advisor 2016.3 published by [employer].  Last modified: 2015-09-22  Last reviewed: 2015-09-21  This content is reviewed periodically and is subject to change as new health information becomes available. The information is intended to inform and educate and is not a replacement for medical evaluation, advice, diagnosis or treatment by a healthcare professional.  References   Adult Advisor 2016.3 Index    Copyright   2016 [employer], a division of [employer] All rights reserved.

## 2018-01-05 NOTE — TELEPHONE ENCOUNTER
Patient Information     Patient Name MRN Floresita Nazario [id number] Female 1970      Telephone Encounter by Madhuri Rice at 2017 11:25 AM     Author:  Madhuri Rice Service:  (none) Author Type:  (none)     Filed:  2017 11:36 AM Encounter Date:  2017 Status:  Signed     :  Madhuri Rice            Patient states she has been on Humalog for 15 years and has never tried anything else.  She is willing to try novolog please send order to pharmacy  Novolog added and set up for fill.   D/C'd humalog    Madhuri Rice LPN........................2017  11:26 AM

## 2018-01-05 NOTE — TELEPHONE ENCOUNTER
Patient Information     Patient Name MRN Floresita Nazario [id number] Female 1970      Telephone Encounter by Madhuri Rice at 2017  8:47 AM     Author:  Madhuri Rice Service:  (none) Author Type:  (none)     Filed:  2017  8:53 AM Encounter Date:  2017 Status:  Signed     :  Madhuri Rice            Received paperwork that patients humalog is no longer covered under insurance.  She would need to try novolog or requirement : trial and failure of 3 or more in a class with at least 3 alternatives.  2 in a class with 2 alternatives, or 1 in a class with only 1 alternative.   Patient was in a meeting and will call back.   aMdhuri Rice LPN........................2017  8:52 AM

## 2018-01-05 NOTE — TELEPHONE ENCOUNTER
Patient Information     Patient Name MRN Floresita Nazario [id number] Female 1970      Telephone Encounter by Crystal Balderas RN at 2017  4:29 PM     Author:  Crystal Balderas RN Service:  (none) Author Type:  (none)     Filed:  2017  4:31 PM Encounter Date:  2017 Status:  Signed     :  Crystal Balderas RN (NURS- Registered Nurse)            Diabetes    Office visit in the past 12 months or per provider note.    Last visit with JOSE R BLAKE was on: 2016 in [facility]  Next visit with JOSE R BLAKE is on: No future appointment listed with this provider  Next visit with Family Practice is on: No future appointment listed in this department    Lab test requirements:  HgbA1c annually or per provider note.  HEMOGLOBIN A1C MONITORING (POCT)      Date   Value   2017        Comment:      Testing done at other site by other provider  This is a corrected result. Previously reported as 7.0 % on 2017 at 1357 CDT    2013   7.1 % NGSP (H)       Max refill for 12 months from last office visit or per provider note.    Prescription refilled per RN Medication Refill Policy.................... Crystal Balderas ....................  2017   4:30 PM

## 2018-01-05 NOTE — PROGRESS NOTES
Patient Information     Patient Name MRN Sex Floresita Florentino [id number] Female 1970      Progress Notes by Melissa Ruelas at 2017  7:25 AM     Author:  Melissa Ruelas Service:  (none) Author Type:  (none)     Filed:  2017  7:25 AM Date of Service:  2017  7:25 AM Status:  Signed     :  Melissa Ruelas            Falls Risk Criteria:    Age 65 and older or under age 4        Sensory deficits    Poor vision    Use of ambulatory aides    Impaired judgment    Unable to walk independently    Meets High Risk criteria for falls:  no

## 2018-01-25 VITALS
SYSTOLIC BLOOD PRESSURE: 122 MMHG | WEIGHT: 161 LBS | TEMPERATURE: 97 F | HEIGHT: 66 IN | DIASTOLIC BLOOD PRESSURE: 70 MMHG | BODY MASS INDEX: 25.88 KG/M2 | HEART RATE: 68 BPM

## 2018-01-25 VITALS
HEIGHT: 66 IN | BODY MASS INDEX: 25.88 KG/M2 | HEART RATE: 60 BPM | SYSTOLIC BLOOD PRESSURE: 132 MMHG | DIASTOLIC BLOOD PRESSURE: 84 MMHG | WEIGHT: 161 LBS | TEMPERATURE: 96.9 F | OXYGEN SATURATION: 98 %

## 2018-02-08 ENCOUNTER — DOCUMENTATION ONLY (OUTPATIENT)
Dept: FAMILY MEDICINE | Facility: OTHER | Age: 48
End: 2018-02-08

## 2018-02-08 RX ORDER — SIMVASTATIN 20 MG
20 TABLET ORAL AT BEDTIME
COMMUNITY
Start: 2017-04-25 | End: 2018-03-02

## 2018-02-08 RX ORDER — TRIAMCINOLONE ACETONIDE 1 MG/G
CREAM TOPICAL 2 TIMES DAILY PRN
COMMUNITY
Start: 2014-05-05 | End: 2022-05-13

## 2018-02-08 RX ORDER — LEVOTHYROXINE SODIUM 100 UG/1
1 TABLET ORAL
COMMUNITY
Start: 2017-12-05 | End: 2018-03-02

## 2018-02-08 RX ORDER — INSULIN GLARGINE 100 [IU]/ML
20 INJECTION, SOLUTION SUBCUTANEOUS
COMMUNITY
Start: 2014-07-02 | End: 2018-03-02

## 2018-02-08 RX ORDER — BLOOD-GLUCOSE METER
EACH MISCELLANEOUS
COMMUNITY
Start: 2014-06-25

## 2018-02-08 RX ORDER — SYRINGE-NEEDLE,INSULIN,0.5 ML 27GX1/2"
SYRINGE, EMPTY DISPOSABLE MISCELLANEOUS
COMMUNITY
Start: 2016-01-15

## 2018-02-08 RX ORDER — ASPIRIN 81 MG/1
81 TABLET ORAL
COMMUNITY
Start: 2014-02-17

## 2018-02-12 NOTE — TELEPHONE ENCOUNTER
Patient Information     Patient Name MRN Sex Floresita Florentino [id number] Female 1970      Telephone Encounter by Francheska Dewitt RN at 2017  9:57 AM     Author:  Francheska Dewitt RN Service:  (none) Author Type:  NURS- Registered Nurse     Filed:  2017 10:00 AM Encounter Date:  2017 Status:  Signed     :  Francheska Dewitt RN (NURS- Registered Nurse)            [facility] and pt request a refill Rx for levothyroxine (Synthroid).    Hypothyroidism    Office visit in the past 12 months or per provider note.    Last visit with JOSE R BLAKE was on: 10/26/2017 in [facility]  Next visit with JOSE R BLAKE is on: No future appointment listed with this provider  Next visit with Family Practice is on: No future appointment listed in this department    Lab testing requirements:  TSH annually  TSH (uIU/mL)    Date Value   2016 3.12       Max refill for 12 months from last office visit or per provider note.    Prescription refilled per RN Medication Refill Policy.................... Francheska Dewitt RN ....................  2017   10:00 AM

## 2018-02-13 ENCOUNTER — TRANSFERRED RECORDS (OUTPATIENT)
Dept: HEALTH INFORMATION MANAGEMENT | Facility: OTHER | Age: 48
End: 2018-02-13

## 2018-03-01 ENCOUNTER — TELEPHONE (OUTPATIENT)
Dept: FAMILY MEDICINE | Facility: OTHER | Age: 48
End: 2018-03-01

## 2018-03-01 DIAGNOSIS — E10.9 TYPE 1 DIABETES MELLITUS WITHOUT COMPLICATION (H): Primary | ICD-10-CM

## 2018-03-01 DIAGNOSIS — E06.3 HASHIMOTO'S THYROIDITIS: ICD-10-CM

## 2018-03-01 NOTE — TELEPHONE ENCOUNTER
Left message to call back....................  3/1/2018   9:46 AM  Madhuri Rice LPN........................3/1/2018  9:46 AM

## 2018-03-01 NOTE — TELEPHONE ENCOUNTER
Patient needs A1C, Creatinine , T4, TSH, LIPID.   Orders had  in old system so did not transfer.   Madhuri Rice LPN........................3/1/2018  10:39 AM

## 2018-03-01 NOTE — TELEPHONE ENCOUNTER
Floresita Hui is scheduled for an outpatient lab appointment tomorrow, 3/2/18 at 8:00. Lab has no orders for this patient. Would Dr. Denson like to place lab orders for Floresita Hill's lab only appointment tomorrow morning?  Thank you

## 2018-03-02 ENCOUNTER — OFFICE VISIT (OUTPATIENT)
Dept: FAMILY MEDICINE | Facility: OTHER | Age: 48
End: 2018-03-02
Attending: FAMILY MEDICINE
Payer: COMMERCIAL

## 2018-03-02 VITALS
DIASTOLIC BLOOD PRESSURE: 74 MMHG | SYSTOLIC BLOOD PRESSURE: 122 MMHG | HEIGHT: 66 IN | WEIGHT: 169 LBS | BODY MASS INDEX: 27.16 KG/M2 | HEART RATE: 68 BPM

## 2018-03-02 DIAGNOSIS — E10.9 TYPE 1 DIABETES MELLITUS WITHOUT COMPLICATION (H): Primary | ICD-10-CM

## 2018-03-02 DIAGNOSIS — Z12.4 CERVICAL CANCER SCREENING: ICD-10-CM

## 2018-03-02 DIAGNOSIS — F34.1 DYSTHYMIC DISORDER: ICD-10-CM

## 2018-03-02 DIAGNOSIS — E06.3 HASHIMOTO'S THYROIDITIS: ICD-10-CM

## 2018-03-02 DIAGNOSIS — Z30.432 ENCOUNTER FOR IUD REMOVAL: ICD-10-CM

## 2018-03-02 DIAGNOSIS — E10.9 TYPE 1 DIABETES MELLITUS WITHOUT COMPLICATION (H): ICD-10-CM

## 2018-03-02 LAB
CHOLEST SERPL-MCNC: 245 MG/DL
CREAT SERPL-MCNC: 0.83 MG/DL (ref 0.6–1.2)
GFR SERPL CREATININE-BSD FRML MDRD: 73 ML/MIN/1.7M2
HBA1C MFR BLD: 6.9 % (ref 4–6)
HDLC SERPL-MCNC: 97 MG/DL (ref 23–92)
LDLC SERPL CALC-MCNC: 138 MG/DL
NONHDLC SERPL-MCNC: 148 MG/DL
T4 FREE SERPL-MCNC: 0.96 NG/DL (ref 0.6–1.6)
TRIGL SERPL-MCNC: 51 MG/DL
TSH SERPL DL<=0.05 MIU/L-ACNC: 4.15 IU/ML (ref 0.34–5.6)

## 2018-03-02 PROCEDURE — G0123 SCREEN CERV/VAG THIN LAYER: HCPCS | Performed by: FAMILY MEDICINE

## 2018-03-02 PROCEDURE — 90732 PPSV23 VACC 2 YRS+ SUBQ/IM: CPT | Performed by: FAMILY MEDICINE

## 2018-03-02 PROCEDURE — 90471 IMMUNIZATION ADMIN: CPT | Performed by: FAMILY MEDICINE

## 2018-03-02 PROCEDURE — 80061 LIPID PANEL: CPT | Performed by: FAMILY MEDICINE

## 2018-03-02 PROCEDURE — 99214 OFFICE O/P EST MOD 30 MIN: CPT | Mod: 25 | Performed by: FAMILY MEDICINE

## 2018-03-02 PROCEDURE — 82565 ASSAY OF CREATININE: CPT | Performed by: FAMILY MEDICINE

## 2018-03-02 PROCEDURE — 87624 HPV HI-RISK TYP POOLED RSLT: CPT | Performed by: FAMILY MEDICINE

## 2018-03-02 PROCEDURE — 84443 ASSAY THYROID STIM HORMONE: CPT | Performed by: FAMILY MEDICINE

## 2018-03-02 PROCEDURE — 58301 REMOVE INTRAUTERINE DEVICE: CPT | Performed by: FAMILY MEDICINE

## 2018-03-02 PROCEDURE — 40001026 ZZHCL STATISTICAL PAP TEST QC: Performed by: FAMILY MEDICINE

## 2018-03-02 PROCEDURE — 84439 ASSAY OF FREE THYROXINE: CPT | Performed by: FAMILY MEDICINE

## 2018-03-02 PROCEDURE — 83036 HEMOGLOBIN GLYCOSYLATED A1C: CPT | Performed by: FAMILY MEDICINE

## 2018-03-02 PROCEDURE — 88142 CYTOPATH C/V THIN LAYER: CPT | Mod: ZL | Performed by: FAMILY MEDICINE

## 2018-03-02 PROCEDURE — 36415 COLL VENOUS BLD VENIPUNCTURE: CPT | Performed by: FAMILY MEDICINE

## 2018-03-02 RX ORDER — SIMVASTATIN 10 MG
10 TABLET ORAL AT BEDTIME
Qty: 90 TABLET | Refills: 3 | Status: SHIPPED | OUTPATIENT
Start: 2018-03-02 | End: 2019-04-10

## 2018-03-02 RX ORDER — INSULIN GLARGINE 100 [IU]/ML
20 INJECTION, SOLUTION SUBCUTANEOUS AT BEDTIME
Qty: 3 VIAL | Refills: 3 | Status: SHIPPED | OUTPATIENT
Start: 2018-03-02 | End: 2022-08-02

## 2018-03-02 RX ORDER — LEVOTHYROXINE SODIUM 100 UG/1
100 TABLET ORAL
Qty: 90 TABLET | Refills: 3 | Status: SHIPPED | OUTPATIENT
Start: 2018-03-02 | End: 2019-04-10

## 2018-03-02 ASSESSMENT — PAIN SCALES - GENERAL: PAINLEVEL: NO PAIN (0)

## 2018-03-02 NOTE — NURSING NOTE
Patient present for follow up Diabetes.   Madhuri Rice LPN........................3/2/2018  12:55 PM

## 2018-03-02 NOTE — LETTER
GROUP THERAPY PROGRESS NOTE    Maegan [place] is participating in [place].      Group time: 15 minutes    Personal goal for participation: goals/unit rules    Goal orientation: community    Group therapy participation: active    Therapeutic interventions reviewed and discussed:     Impression of participation: March 8, 2018      Floresita Hill  [address] [facility] [address]        Dear Ms.Burns Hill,    We are writing to inform you of your test results.    Your test results fall within the expected range(s) or remain unchanged from previous results.  Please continue with current treatment plan.     Specimen Description Cervical Cells  Final   [id number]       Resulted Orders   HPV High Risk Types DNA Cervical   Result Value Ref Range    HPV Source ThinPrep       Comment:      CORRECTED ON 03/06 AT 0730: PREVIOUSLY REPORTED AS [facility]    HPV 16 DNA Negative NEG^Negative    HPV 18 DNA Negative NEG^Negative    Other HR HPV Negative NEG^Negative    Final Diagnosis This patient's sample is negative for HPV DNA.       Comment:      (Note)  METHODOLOGY:  The Roche rogelio 4800 system uses automated extraction,   simultaneous amplification of HPV (L1 region) and beta-globin,    followed by  real time detection of fluorescent labeled HPV and beta   globin using specific oligonucleotide probes . The test specifically   identifies types HPV 16 DNA and HPV 18 DNA while concurrently   detecting the rest of the high risk types (31, 33, 35, 39, 45, 51,   52, 56, 58, 59, 66 or 68).  COMMENTS:  This test is not intended for use as a screening device   for women under age 30 with normal cervical cytology.  Results should   be correlated with cytologic and histologic findings. Close clinical   followup is recommended.      Specimen Description Cervical Cells       Comment:      [id number]       If you have any questions or concerns, please call the clinic at the number listed above.       Sincerely,        JOSE R BLAKE MD

## 2018-03-02 NOTE — PATIENT INSTRUCTIONS
Pap results will be mailed to your home.   Work on following  a mediterranean diet; Use monosaturated fats ( olive , canola and peanut   oil; nuts, avocados), abundance of plant foods which are minimally processed, fish (salmon).  Exercise 30minutes every day  You may have some spotting after IUD pulled.  Menses may regulate to monthly periods, however if heavy or prolonged then would need pelvic ultrasound and then possible endometrial biopsy with Dr. Gomez  Due for mammogram 5/17 17  Try to get 7-8 hours sleep each night.  Rest is important!      Pneumococcal Vaccination  There are 2 pneumococcal vaccinations that protect people against pneumococcal disease.  Pneumococcal disease  Pneumococcal disease is caused by bacteria (Streptococcus pneumoniae). This germ is easily spread when someone with the bacteria coughs, sneezes, laughs, or talks. You can get pneumococcal disease more than once. This is because there are many different types (strains) of the bacteria. Some strains are also resistant to treatment with antibiotics.  There are different kinds of pneumococcal disease, depending on what part of the body is infected. They include:    Pneumonia. Infection in the lungs.    Meningitis. Infection of the covering of the brain and spinal cord.    Otitis media. Infection of the middle ear.    Bacteremia or septicemia. Infection in the blood.  Pneumococcal disease can be life-threatening, especially for people in high-risk groups. Each year, thousands of people die of this disease. Thousands more become seriously ill.  The vaccine    The pneumococcal vaccines are the best way to avoid pneumococcal disease. They are safe and effective. The vaccines are given as shots (injections). This can be done at your healthcare provider's office or a health clinic. Drugstores, senior centers, and workplaces often offer vaccinations, too. If you have questions, ask your healthcare provider.  The pneumococcal vaccines are  recommended for:    Persons 65 and older    Infants    People with chronic health problems (such as diabetes, chronic lung or heart disease, liver disease)    People who have a cochlear implant    People who have weakened immune systems    People who live in nursing homes or other long-term care facilities    People who smoke or have asthma  They are given:    In a 4-dose series in infants    One time in adults; some people need a second dose of one of the vaccines  Your healthcare provider can tell you more about the vaccines, whether you should get them, and the number of shots you should get.  Date Last Reviewed: 11/1/2016 2000-2017 "[employer]". [address] [place] [address]. All rights reserved. This information is not intended as a substitute for professional medical care. Always follow your healthcare professional's instructions.        Heavy Menstrual Bleeding    Heavy menstrual bleeding means that your periods are heavier or longer than usual. You may soak through a pad or tampon every 1 to 3 hours on the heaviest days of your period. You may also pass large, dark clots. And your periods may last longer than 7 days.  If you have heavy periods often, this can cause a problem called anemia. With anemia, your red blood cell count is too low. Red blood cells are needed because they help carry oxygen throughout your body. Severe anemia may cause you to look pale and feel weak or tired. You might also become short of breath easily.  There are many possible causes of heavy menstrual bleeding. Hormonal imbalance is the most common cause. Having benign growths in your uterus, such as fibroids or polyps, is another cause. Taking certain medicines or having certain health problems or bleeding disorders are also causes.  To treat heavy menstrual bleeding, medicines are often tried first. If these don t help, further testing and treatments will likely be needed.  Home care  Medicines  If  you re prescribed medicines, be sure to take them as directed.    To help control heavy bleeding, any of the following may be used:    Hormone therapy (this includes all methods of hormonal birth control such as pills, shots, cream, ring, patch, or hormone-releasing IUD)    Nonsteroidal anti-inflammatory drugs (NSAIDs), such as ibuprofen    Antifibrinolytic medicines, such as transexamic acid    To help treat anemia, iron supplements may be prescribed.            General care    Get plenty of rest if you tire easily. Avoid heavy exertion.    To help relieve pain or cramping, try using a heating pad on the lower belly or back. A warm bath may also help.  Follow-up care  Follow up with your healthcare provider as directed.  When to seek medical advice  Call your healthcare provider right away if any of these occur:    Heavier bleeding (soaking 1 pad or tampon every hour for 3 hours)    Heavy bleeding that lasts longer than 1 week    Fever of 100.4 F (38 C) or higher, or as directed by your provider    Pain or cramping that gets worse instead of better    Signs of anemia such as pale skin, extreme fatigue or weakness, or shortness of breath    Dizziness or fainting  Date Last Reviewed: 6/11/2015 2000-2017 [employer]. [address] [place] [address]. All rights reserved. This information is not intended as a substitute for professional medical care. Always follow your healthcare professional's instructions.        Endometrial Ablation  Endometrial ablation is an outpatient surgery that can reduce or stop heavy menstrual bleeding. Ablation destroys the lining of the uterus. This surgery is for women who do not want to have any more children and who have not yet entered menopause. It should not be used by women with endometrial hyperplasia or cancer of the uterus. Surgery takes less than an hour, and you can go home later that day.Endometrial ablation is an outpatient surgery that can reduce or  stop heavy menstrual bleeding. Ablation destroys the lining of the uterus. This surgery is for women who do not want to have any more children and who have not yet entered menopause. It should not be used by women with endometrial hyperplasia or cancer of the uterus. Surgery takes less than an hour, and you can go home later that day.  Preparing for surgery    You may be given medicine by mouth or injection for a few weeks or months before your surgery. This thins the lining of the uterus and reduces bleeding.    Your healthcare provider may recommend other procedures to check the inside of your uterus before the ablation is done.     The day before surgery, you may be given medicine or a special substance (laminaria) may be put into the opening to the uterus (cervix). This widens the opening.    To help prevent problems with anesthesia, do not eat or drink anything 10 hours before surgery.  Your surgery     Destroying the lining with heat, freezing, or electric current prevents the lining from growing back.        You ll be given anesthesia so you stay comfortable and relaxed. You will not feel pain during surgery.    Your uterus may be filled with fluid. This puts pressure on the lining to help reduce bleeding. It also allows your healthcare provider to see inside your uterus.    Your healthcare provider puts a small telescope-like instrument through the cervix. This scope may be connected to a video monitor. This helps your healthcare provider see and control the ablation process. At the end of the scope, a device using heat, extreme cold, or electric current destroys the uterine lining. Instead of the scope, your healthcare provider may use another device that both expands and destroys the uterine lining. After being inserted into your uterus, it also uses heat or other energy to destroy the lining. Your healthcare provider will choose the device that s best for you.  Your recovery    You may have cramping or  aching in your abdomen after surgery. Your healthcare provider can give you pain medicine.    You may also have a bloody or watery discharge or bleeding for days or weeks. Use sanitary pads, not tampons.    Don t have sex or play active sports for 2 weeks after surgery.    You can likely return to work in 2 days.    Ask your healthcare provider about using contraception after an ablation.    Your healthcare provider will see you in about 6 weeks to check how well you are healing.  Call your healthcare provider if you have any of the following after surgery:    Chills    Fever of 100.4 F (38 C) or higher, or as directed by your healthcare provider    Frequent urination for 24 hours    Heavy vaginal bleeding    Nausea    Persistent or increased abdominal pain    Shortness of breath   Date Last Reviewed: 6/1/2017 2000-2017 [employer]. [address] [place] [address], [place] [address]. All rights reserved. This information is not intended as a substitute for professional medical care. Always follow your healthcare professional's instructions.

## 2018-03-02 NOTE — LETTER
March 2, 2018      Floresita Peña Sergio  [address] [address]    Dear Ms.Burns Hill,    We are writing to inform you of your test results.    Your test results fall within the expected range(s) or remain unchanged from previous results.  Please continue with current treatment plan. Your  Cholesterol is elevated but this is due to the protective HDL  so no adjustment in your zocor.    Results for orders placed or performed in visit on 03/02/18   T4, free   Result Value Ref Range    T4 Free 0.96 0.60 - 1.60 ng/dL   TSH GH   Result Value Ref Range    Thyrotropin 4.15 0.34 - 5.60 IU/mL   Hemoglobin A1c   Result Value Ref Range    Hemoglobin A1C 6.9 (H) 4.0 - 6.0 %   Lipid Profile (Chol, Trig, HDL, LDL calc) - FASTING   Result Value Ref Range    Cholesterol 245 (H) <200 mg/dL    Triglycerides 51 <150 mg/dL    HDL Cholesterol 97 (H) 23 - 92 mg/dL    LDL Cholesterol Calculated 138 (H) <100 mg/dL    Non HDL Cholesterol 148 (H) <130 mg/dL   Creatinine   Result Value Ref Range    Creatinine 0.83 0.60 - 1.20 mg/dL    GFR Estimate 73 >60 mL/min/1.7m2    GFR Estimate If Black 88 >60 mL/min/1.7m2   If you have any questions or concerns, please call the clinic at the number listed above.       Sincerely,        JOSE R BLAKE MD

## 2018-03-02 NOTE — MR AVS SNAPSHOT
After Visit Summary   3/2/2018    Floresita Hill    MRN: [id number]           Patient Information     Date Of Birth          1970        Visit Information        Provider Department      3/2/2018 12:45 PM Mary Denson MD [facility]        Today's Diagnoses     Type 1 diabetes mellitus without complication (H)    -  1    Hashimoto's thyroiditis        Dysthymic disorder        Encounter for IUD removal        Cervical cancer screening          Care Instructions      Pap results will be mailed to your home.   Work on following  a mediterranean diet; Use monosaturated fats ( olive , canola and peanut   oil; nuts, avocados), abundance of plant foods which are minimally processed, fish (salmon).  Exercise 30minutes every day  You may have some spotting after IUD pulled.  Menses may regulate to monthly periods, however if heavy or prolonged then would need pelvic ultrasound and then possible endometrial biopsy with Dr. Jason Adams for mammogram 5/17 17  Try to get 7-8 hours sleep each night.  Rest is important!      Pneumococcal Vaccination  There are 2 pneumococcal vaccinations that protect people against pneumococcal disease.  Pneumococcal disease  Pneumococcal disease is caused by bacteria (Streptococcus pneumoniae). This germ is easily spread when someone with the bacteria coughs, sneezes, laughs, or talks. You can get pneumococcal disease more than once. This is because there are many different types (strains) of the bacteria. Some strains are also resistant to treatment with antibiotics.  There are different kinds of pneumococcal disease, depending on what part of the body is infected. They include:    Pneumonia. Infection in the lungs.    Meningitis. Infection of the covering of the brain and spinal cord.    Otitis media. Infection of the middle ear.    Bacteremia or septicemia. Infection in the blood.  Pneumococcal disease can be life-threatening,  especially for people in high-risk groups. Each year, thousands of people die of this disease. Thousands more become seriously ill.  The vaccine    The pneumococcal vaccines are the best way to avoid pneumococcal disease. They are safe and effective. The vaccines are given as shots (injections). This can be done at your healthcare provider's office or a health clinic. Drugstores, senior centers, and workplaces often offer vaccinations, too. If you have questions, ask your healthcare provider.  The pneumococcal vaccines are recommended for:    Persons 65 and older    Infants    People with chronic health problems (such as diabetes, chronic lung or heart disease, liver disease)    People who have a cochlear implant    People who have weakened immune systems    People who live in nursing homes or other long-term care facilities    People who smoke or have asthma  They are given:    In a 4-dose series in infants    One time in adults; some people need a second dose of one of the vaccines  Your healthcare provider can tell you more about the vaccines, whether you should get them, and the number of shots you should get.  Date Last Reviewed: 11/1/2016 2000-2017 The [employer]. [address] [place] [address]. All rights reserved. This information is not intended as a substitute for professional medical care. Always follow your healthcare professional's instructions.        Heavy Menstrual Bleeding    Heavy menstrual bleeding means that your periods are heavier or longer than usual. You may soak through a pad or tampon every 1 to 3 hours on the heaviest days of your period. You may also pass large, dark clots. And your periods may last longer than 7 days.  If you have heavy periods often, this can cause a problem called anemia. With anemia, your red blood cell count is too low. Red blood cells are needed because they help carry oxygen throughout your body. Severe anemia may cause you to look pale and  feel weak or tired. You might also become short of breath easily.  There are many possible causes of heavy menstrual bleeding. Hormonal imbalance is the most common cause. Having benign growths in your uterus, such as fibroids or polyps, is another cause. Taking certain medicines or having certain health problems or bleeding disorders are also causes.  To treat heavy menstrual bleeding, medicines are often tried first. If these don t help, further testing and treatments will likely be needed.  Home care  Medicines  If you re prescribed medicines, be sure to take them as directed.    To help control heavy bleeding, any of the following may be used:    Hormone therapy (this includes all methods of hormonal birth control such as pills, shots, cream, ring, patch, or hormone-releasing IUD)    Nonsteroidal anti-inflammatory drugs (NSAIDs), such as ibuprofen    Antifibrinolytic medicines, such as transexamic acid    To help treat anemia, iron supplements may be prescribed.            General care    Get plenty of rest if you tire easily. Avoid heavy exertion.    To help relieve pain or cramping, try using a heating pad on the lower belly or back. A warm bath may also help.  Follow-up care  Follow up with your healthcare provider as directed.  When to seek medical advice  Call your healthcare provider right away if any of these occur:    Heavier bleeding (soaking 1 pad or tampon every hour for 3 hours)    Heavy bleeding that lasts longer than 1 week    Fever of 100.4 F (38 C) or higher, or as directed by your provider    Pain or cramping that gets worse instead of better    Signs of anemia such as pale skin, extreme fatigue or weakness, or shortness of breath    Dizziness or fainting  Date Last Reviewed: 6/11/2015 2000-2017 [employer]. [address] [place] [address], [place] [address]. All rights reserved. This information is not intended as a substitute for professional medical care. Always follow your healthcare  professional's instructions.        Endometrial Ablation  Endometrial ablation is an outpatient surgery that can reduce or stop heavy menstrual bleeding. Ablation destroys the lining of the uterus. This surgery is for women who do not want to have any more children and who have not yet entered menopause. It should not be used by women with endometrial hyperplasia or cancer of the uterus. Surgery takes less than an hour, and you can go home later that day.Endometrial ablation is an outpatient surgery that can reduce or stop heavy menstrual bleeding. Ablation destroys the lining of the uterus. This surgery is for women who do not want to have any more children and who have not yet entered menopause. It should not be used by women with endometrial hyperplasia or cancer of the uterus. Surgery takes less than an hour, and you can go home later that day.  Preparing for surgery    You may be given medicine by mouth or injection for a few weeks or months before your surgery. This thins the lining of the uterus and reduces bleeding.    Your healthcare provider may recommend other procedures to check the inside of your uterus before the ablation is done.     The day before surgery, you may be given medicine or a special substance (laminaria) may be put into the opening to the uterus (cervix). This widens the opening.    To help prevent problems with anesthesia, do not eat or drink anything 10 hours before surgery.  Your surgery     Destroying the lining with heat, freezing, or electric current prevents the lining from growing back.        You ll be given anesthesia so you stay comfortable and relaxed. You will not feel pain during surgery.    Your uterus may be filled with fluid. This puts pressure on the lining to help reduce bleeding. It also allows your healthcare provider to see inside your uterus.    Your healthcare provider puts a small telescope-like instrument through the cervix. This scope may be connected to a video  monitor. This helps your healthcare provider see and control the ablation process. At the end of the scope, a device using heat, extreme cold, or electric current destroys the uterine lining. Instead of the scope, your healthcare provider may use another device that both expands and destroys the uterine lining. After being inserted into your uterus, it also uses heat or other energy to destroy the lining. Your healthcare provider will choose the device that s best for you.  Your recovery    You may have cramping or aching in your abdomen after surgery. Your healthcare provider can give you pain medicine.    You may also have a bloody or watery discharge or bleeding for days or weeks. Use sanitary pads, not tampons.    Don t have sex or play active sports for 2 weeks after surgery.    You can likely return to work in 2 days.    Ask your healthcare provider about using contraception after an ablation.    Your healthcare provider will see you in about 6 weeks to check how well you are healing.  Call your healthcare provider if you have any of the following after surgery:    Chills    Fever of 100.4 F (38 C) or higher, or as directed by your healthcare provider    Frequent urination for 24 hours    Heavy vaginal bleeding    Nausea    Persistent or increased abdominal pain    Shortness of breath   Date Last Reviewed: 6/1/2017 2000-2017 [employer]. [address] [place] [address], [place] [address]. All rights reserved. This information is not intended as a substitute for professional medical care. Always follow your healthcare professional's instructions.                Follow-ups after your visit        Who to contact     If you have questions or need follow up information about today's clinic visit or your schedule please contact [facility] directly at [phone].  Normal or non-critical lab and imaging results will be communicated to you by MyChart, letter or phone within 4 business  "days after the clinic has received the results. If you do not hear from us within 7 days, please contact the clinic through clypd or phone. If you have a critical or abnormal lab result, we will notify you by phone as soon as possible.  Submit refill requests through clypd or call your pharmacy and they will forward the refill request to us. Please allow 3 business days for your refill to be completed.          Additional Information About Your Visit        clypd Information     clypd lets you send messages to your doctor, view your test results, renew your prescriptions, schedule appointments and more. To sign up, go to [email or web address] . Click on \"Log in\" on the left side of the screen, which will take you to the Welcome page. Then click on \"Sign up Now\" on the right side of the page.     You will be asked to enter the access code listed below, as well as some personal information. Please follow the directions to create your username and password.     Your access code is: [email or web address]  Expires: 2018  1:37 PM     Your access code will  in 90 days. If you need help or a new code, please call your [facility] clinic or [phone].        Care EveryWhere ID     This is your Care EveryWhere ID. This could be used by other organizations to access your [facility] medical records  [id number]        Your Vitals Were     Pulse Height Breastfeeding? BMI (Body Mass Index)          68 5' 5.65\" (1.668 m) No 27.57 kg/m2         Blood Pressure from Last 3 Encounters:   18 122/74   10/26/17 122/70   17 132/84    Weight from Last 3 Encounters:   18 169 lb (76.7 kg)   10/26/17 161 lb (73 kg)   17 161 lb (73 kg)              We Performed the Following     HPV High Risk Types DNA Cervical     Pap Screen Thin Prep with HPV - recommended age 30 - 65 years (select HPV order below)     Pneumococcal vaccine 23 valent PPSV23  (Pneumovax) [23692]     REMOVE INTRAUTERINE DEVICE          Today's " Medication Changes          These changes are accurate as of 3/2/18  1:37 PM.  If you have any questions, ask your nurse or doctor.               These medicines have changed or have updated prescriptions.        Dose/Directions    insulin glargine 100 UNIT/ML injection   Commonly known as:  LANTUS   This may have changed:  when to take this   Used for:  Type 1 diabetes mellitus without complication (H)   Changed by:  Mary Denson MD        Dose:  20 Units   Inject 20 Units Subcutaneous At Bedtime Before bedtime   Quantity:  3 vial   Refills:  3       simvastatin 10 MG tablet   Commonly known as:  ZOCOR   This may have changed:    - medication strength  - how much to take   Used for:  Type 1 diabetes mellitus without complication (H)   Changed by:  Mary Denson MD        Dose:  10 mg   Take 1 tablet (10 mg) by mouth At Bedtime   Quantity:  90 tablet   Refills:  3            Where to get your medicines      These medications were sent to [facility], - [place] - [address]   [address] , [place] [address]     Phone:  [phone]     FLUoxetine 20 MG capsule    insulin glargine 100 UNIT/ML injection    levothyroxine 100 MCG tablet    simvastatin 10 MG tablet                Primary Care Provider Office Phone # Fax #    Mary Denson [phone]       [address] [facility] [address]        Equal Access to Services     [facility]: Hadii chela duong hadasho Sogwendolyn, waaxda luqadaha, qaybta kaalmada adeegyada, yessy montaño . So [facility] [place] [phone].    ATENCIÓN: Si habla español, tiene a vega disposición servicios gratuitos de asistencia lingüística. Llame al [phone].    We comply with applicable federal civil rights laws and [place] laws. We do not discriminate on the basis of race, color, national origin, age, disability, sex, sexual orientation, or gender identity.            Thank you!     Thank  "you for choosing [facility]  for your care. Our goal is always to provide you with excellent care. Hearing back from our patients is one way we can continue to improve our services. Please take a few minutes to complete the written survey that you may receive in the mail after your visit with us. Thank you!             Your Updated Medication List - Protect others around you: Learn how to safely use, store and throw away your medicines at www.disposemymeds.org.          This list is accurate as of 3/2/18  1:37 PM.  Always use your most recent med list.                   Brand Name Dispense Instructions for use Diagnosis    ACCU-CHEK JESICA PLUS test strip   Generic drug:  blood glucose monitoring      USE ONE STRIP TO CHECK GLUCOSE FIVE TIMES DAILY.        aspirin EC 81 MG EC tablet      Take 81 mg by mouth daily with food        B-D INSULIN SYRINGE ULTRAFINE 29G X 1/2\" 0.5 ML   Generic drug:  insulin syringe-needle U-100      For administering insulin at home.        FLUoxetine 20 MG capsule    PROzac    90 capsule    Take 1 capsule (20 mg) by mouth every morning    Dysthymic disorder       insulin aspart 100 UNITS/ML injection    NovoLOG     per pump        insulin glargine 100 UNIT/ML injection    LANTUS    3 vial    Inject 20 Units Subcutaneous At Bedtime Before bedtime    Type 1 diabetes mellitus without complication (H)       levonorgestrel 20 MCG/24HR IUD    MIRENA     Placed May 2013        levothyroxine 100 MCG tablet    SYNTHROID/LEVOTHROID    90 tablet    Take 1 tablet (100 mcg) by mouth every morning (before breakfast)    Hashimoto's thyroiditis       simvastatin 10 MG tablet    ZOCOR    90 tablet    Take 1 tablet (10 mg) by mouth At Bedtime    Type 1 diabetes mellitus without complication (H)       Traackr BLOOD GLUCOSE MONITOR W/DEVICE Kit      Dispense glucose meter, test strips and lancets covered by the patient insurance. Test 5 times per day. 3 month supply with 4 refills.     "    triamcinolone 0.1 % cream    KENALOG

## 2018-03-03 NOTE — PROGRESS NOTES
Nursing Notes:   Madhuri Rice LPN  3/2/2018  1:05 PM  Signed  Patient present for follow up Diabetes.   Madhuri Rice LPN........................3/2/2018  12:55 PM     .  Subjective:  Floresita Hill is a 47 year old female who presents for med refill, diabetic follow up IUD removal      Type 1 diabetes mellitus without complication (H)   on insulin pump.  Doing well.   She is aware of current [employer] concerns over 600 series key pad.  She was provided MD copy of alert notice.    She has not had problems with her pump.  She feels it is doing a great job.   She continues following a diabetic diet.   No excessive thirst, hunger or urination.  No unusual headaches.      she was on  a statin , takes a baby aspirin but her endocrinologist told her she didn't need it. She sees endocrine in [facility] who manages her diabetes.  When she takes it regularly she does get muscle aches.     Due for pneumonia shot.      Hashimoto's thyroiditis  History of low thyroid.  On synthroid 100 mcg a day.  She has dry skin but it is winter time.  No chest pain, no changes in bowel or bladder.  No palpitations.        Dysthymic disorder  Does well on prozac 20 mg;   Would like refill.  Denies SI, HI or hallucinations.            Encounter for IUD removal  Patient had IUD placed 5 years ago for DUB .   She would like to have it removed.   She has had pap in the last 3 years but asks MD to perform today given need for speculum exam .  She is monogamous.  No discharge. No history of abnormal pap.  No std.             Allergies: reviewed in EMR     Current Outpatient Prescriptions   Medication     simvastatin (ZOCOR) 10 MG tablet     levothyroxine (SYNTHROID/LEVOTHROID) 100 MCG tablet     insulin glargine (LANTUS) 100 UNIT/ML injection     FLUoxetine (PROZAC) 20 MG capsule     blood glucose monitoring (ACCU-CHEK JESICA PLUS) test strip     aspirin EC 81 MG EC tablet     Blood Glucose Monitoring Suppl (SURERecommendo BLOOD  "GLUCOSE MONITOR) W/DEVICE KIT     insulin aspart (NOVOLOG) 100 UNITS/ML injection     insulin syringe-needle U-100 (B-D INSULIN SYRINGE ULTRAFINE) 29G X 1/2\" 0.5 ML     levonorgestrel (MIRENA) 20 MCG/24HR IUD     triamcinolone (KENALOG) 0.1 % cream     No current facility-administered medications for this visit.           Patient Active Problem List   Diagnosis     Dysthymic disorder     Hashimoto's thyroiditis     Iron deficiency anemia     IUD (intrauterine device) in place     Microcytic anemia     Mixed hyperlipidemia     Type 1 diabetes mellitus without complication (H)         Social Hx:  Social History   Substance Use Topics     Smoking status: Never Smoker     Smokeless tobacco: Never Used     Alcohol use 1.2 oz/week      Comment: Alcoholic Drinks/day: Glass of wine daily       Family Hx:   Family History   Problem Relation Age of Onset     Other - See Comments Mother      Myesthenia gravis/Rare kidney disease, on transplant list     Breast Cancer Maternal Aunt      Cancer-breast     Other - See Comments Sister      Psychiatric illness,Bipolar     Thyroid Disease Sister      Thyroid Disease     Other - See Comments Sister      Migraines       Objective:  /74 (BP Location: Right arm, Patient Position: Sitting, Cuff Size: Adult Large)  Pulse 68  Ht 5' 5.65\" (1.668 m)  Wt 169 lb (76.7 kg)  Breastfeeding? No  BMI 27.57 kg/m2    General Appearance:Normal., Pleasant, alert, appropriate appearance for age. No acute distress,  Psych-alert, oriented, and appropriate affect  HEENT-within normal limits   Neck Exam: Normal., Supple, no masses or nodes.  ThyroidExam: No nodules or enlargement., normal to palpation  Lungs:  Clear to auscultation.  Cardiovascular Exam: Regular rate and rhythm. S1, S2, no murmur, click, gallop, or rubs.  Breast Exam: declined   Gastrointestinal Exam: Soft, nontender, no abnormal masses or organomegaly.    Genitourinary Exam Female:   External Genitalia: normal hair distribution, " EG/BUS  Vaginal exam: normal pink mucosa without prolapse or lesions  Cervix: normal appearance without lesions or other abnormalities pap obtained;   IUD appeared to be 4 mm from cervical os with pap.   Using ringed forceps mirena IUD removed in usual fashion without discomfort.  Minimal bleeding.   BME: normal size uterus, no adnexal masses.  Skin: no concerning moles, rashes, orlesions  Extremities:  NT, no edema    Results for orders placed or performed in visit on 03/02/18   T4, free   Result Value Ref Range    T4 Free 0.96 0.60 - 1.60 ng/dL   TSH GH   Result Value Ref Range    Thyrotropin 4.15 0.34 - 5.60 IU/mL   Hemoglobin A1c   Result Value Ref Range    Hemoglobin A1C 6.9 (H) 4.0 - 6.0 %   Lipid Profile (Chol, Trig, HDL, LDL calc) - FASTING   Result Value Ref Range    Cholesterol 245 (H) <200 mg/dL    Triglycerides 51 <150 mg/dL    HDL Cholesterol 97 (H) 23 - 92 mg/dL    LDL Cholesterol Calculated 138 (H) <100 mg/dL    Non HDL Cholesterol 148 (H) <130 mg/dL   Creatinine   Result Value Ref Range    Creatinine 0.83 0.60 - 1.20 mg/dL    GFR Estimate 73 >60 mL/min/1.7m2    GFR Estimate If Black 88 >60 mL/min/1.7m2       Assessment:    ICD-10-CM    1. Type 1 diabetes mellitus without complication (H) E10.9 simvastatin (ZOCOR) 10 MG tablet     insulin glargine (LANTUS) 100 UNIT/ML injection     Pneumococcal vaccine 23 valent PPSV23  (Pneumovax) [87988]   2. Hashimoto's thyroiditis E06.3 levothyroxine (SYNTHROID/LEVOTHROID) 100 MCG tablet   3. Dysthymic disorder F34.1 FLUoxetine (PROZAC) 20 MG capsule   4. Encounter for IUD removal Z30.432 REMOVE INTRAUTERINE DEVICE   5. Cervical cancer screening Z12.4 HPV High Risk Types DNA Cervical     Pap Screen Thin Prep with HPV - recommended age 30 - 65 years (select HPV order below)         I spent approximately 30 minutes with the patient (exclusive of separately billed services/procedures), with greater than 50%spent in counseling, prognosis, risks and benefits of  management or follow-up if return of heavy menses.  She is aware she would need to see GYN.  Discussed use of statin 1/2 tablet twice a week as minimum.  She agreed at lower dose.   Continue prozac.  Thyroid refilled.  .  Reviewed importance of compliance with chosen treatment options and follow-up.  Risk factor reduction and patient education and coordinatingcare, establishing and/or reviewing the patient's medical record also completed during today's exam .      Plan:   -- Expected clinical course discussed   -- Medications and their side effects discussed  Patient Instructions       Pap results will be mailed to your home.   Work on following  a mediterranean diet; Use monosaturated fats ( olive , canola and peanut   oil; nuts, avocados), abundance of plant foods which are minimally processed, fish (salmon).  Exercise 30minutes every day  You may have some spotting after IUD pulled.  Menses may regulate to monthly periods, however if heavy or prolonged then would need pelvic ultrasound and then possible endometrial biopsy with Dr. Jason Adams for mammogram 5/17 17  Try to get 7-8 hours sleep each night.  Rest is important!      Pneumococcal Vaccination  There are 2 pneumococcal vaccinations that protect people against pneumococcal disease.  Pneumococcal disease  Pneumococcal disease is caused by bacteria (Streptococcus pneumoniae). This germ is easily spread when someone with the bacteria coughs, sneezes, laughs, or talks. You can get pneumococcal disease more than once. This is because there are many different types (strains) of the bacteria. Some strains are also resistant to treatment with antibiotics.  There are different kinds of pneumococcal disease, depending on what part of the body is infected. They include:    Pneumonia. Infection in the lungs.    Meningitis. Infection of the covering of the brain and spinal cord.    Otitis media. Infection of the middle ear.    Bacteremia or septicemia. Infection in the  blood.  Pneumococcal disease can be life-threatening, especially for people in high-risk groups. Each year, thousands of people die of this disease. Thousands more become seriously ill.  The vaccine    The pneumococcal vaccines are the best way to avoid pneumococcal disease. They are safe and effective. The vaccines are given as shots (injections). This can be done at your healthcare provider's office or a health clinic. Drugstores, senior centers, and workplaces often offer vaccinations, too. If you have questions, ask your healthcare provider.  The pneumococcal vaccines are recommended for:    Persons 65 and older    Infants    People with chronic health problems (such as diabetes, chronic lung or heart disease, liver disease)    People who have a cochlear implant    People who have weakened immune systems    People who live in nursing homes or other long-term care facilities    People who smoke or have asthma  They are given:    In a 4-dose series in infants    One time in adults; some people need a second dose of one of the vaccines  Your healthcare provider can tell you more about the vaccines, whether you should get them, and the number of shots you should get.  Date Last Reviewed: 11/1/2016 2000-2017 [employer]. [address] [place] [address]. All rights reserved. This information is not intended as a substitute for professional medical care. Always follow your healthcare professional's instructions.        Heavy Menstrual Bleeding    Heavy menstrual bleeding means that your periods are heavier or longer than usual. You may soak through a pad or tampon every 1 to 3 hours on the heaviest days of your period. You may also pass large, dark clots. And your periods may last longer than 7 days.  If you have heavy periods often, this can cause a problem called anemia. With anemia, your red blood cell count is too low. Red blood cells are needed because they help carry oxygen throughout  your body. Severe anemia may cause you to look pale and feel weak or tired. You might also become short of breath easily.  There are many possible causes of heavy menstrual bleeding. Hormonal imbalance is the most common cause. Having benign growths in your uterus, such as fibroids or polyps, is another cause. Taking certain medicines or having certain health problems or bleeding disorders are also causes.  To treat heavy menstrual bleeding, medicines are often tried first. If these don t help, further testing and treatments will likely be needed.  Home care  Medicines  If you re prescribed medicines, be sure to take them as directed.    To help control heavy bleeding, any of the following may be used:    Hormone therapy (this includes all methods of hormonal birth control such as pills, shots, cream, ring, patch, or hormone-releasing IUD)    Nonsteroidal anti-inflammatory drugs (NSAIDs), such as ibuprofen    Antifibrinolytic medicines, such as transexamic acid    To help treat anemia, iron supplements may be prescribed.            General care    Get plenty of rest if you tire easily. Avoid heavy exertion.    To help relieve pain or cramping, try using a heating pad on the lower belly or back. A warm bath may also help.  Follow-up care  Follow up with your healthcare provider as directed.  When to seek medical advice  Call your healthcare provider right away if any of these occur:    Heavier bleeding (soaking 1 pad or tampon every hour for 3 hours)    Heavy bleeding that lasts longer than 1 week    Fever of 100.4 F (38 C) or higher, or as directed by your provider    Pain or cramping that gets worse instead of better    Signs of anemia such as pale skin, extreme fatigue or weakness, or shortness of breath    Dizziness or fainting  Date Last Reviewed: 6/11/2015 2000-2017 [employer]. [address] [place] [address], [place] [address]. All rights reserved. This information is not intended as a substitute for  professional medical care. Always follow your healthcare professional's instructions.        Endometrial Ablation  Endometrial ablation is an outpatient surgery that can reduce or stop heavy menstrual bleeding. Ablation destroys the lining of the uterus. This surgery is for women who do not want to have any more children and who have not yet entered menopause. It should not be used by women with endometrial hyperplasia or cancer of the uterus. Surgery takes less than an hour, and you can go home later that day.Endometrial ablation is an outpatient surgery that can reduce or stop heavy menstrual bleeding. Ablation destroys the lining of the uterus. This surgery is for women who do not want to have any more children and who have not yet entered menopause. It should not be used by women with endometrial hyperplasia or cancer of the uterus. Surgery takes less than an hour, and you can go home later that day.  Preparing for surgery    You may be given medicine by mouth or injection for a few weeks or months before your surgery. This thins the lining of the uterus and reduces bleeding.    Your healthcare provider may recommend other procedures to check the inside of your uterus before the ablation is done.     The day before surgery, you may be given medicine or a special substance (laminaria) may be put into the opening to the uterus (cervix). This widens the opening.    To help prevent problems with anesthesia, do not eat or drink anything 10 hours before surgery.  Your surgery     Destroying the lining with heat, freezing, or electric current prevents the lining from growing back.        You ll be given anesthesia so you stay comfortable and relaxed. You will not feel pain during surgery.    Your uterus may be filled with fluid. This puts pressure on the lining to help reduce bleeding. It also allows your healthcare provider to see inside your uterus.    Your healthcare provider puts a small telescope-like instrument  through the cervix. This scope may be connected to a video monitor. This helps your healthcare provider see and control the ablation process. At the end of the scope, a device using heat, extreme cold, or electric current destroys the uterine lining. Instead of the scope, your healthcare provider may use another device that both expands and destroys the uterine lining. After being inserted into your uterus, it also uses heat or other energy to destroy the lining. Your healthcare provider will choose the device that s best for you.  Your recovery    You may have cramping or aching in your abdomen after surgery. Your healthcare provider can give you pain medicine.    You may also have a bloody or watery discharge or bleeding for days or weeks. Use sanitary pads, not tampons.    Don t have sex or play active sports for 2 weeks after surgery.    You can likely return to work in 2 days.    Ask your healthcare provider about using contraception after an ablation.    Your healthcare provider will see you in about 6 weeks to check how well you are healing.  Call your healthcare provider if you have any of the following after surgery:    Chills    Fever of 100.4 F (38 C) or higher, or as directed by your healthcare provider    Frequent urination for 24 hours    Heavy vaginal bleeding    Nausea    Persistent or increased abdominal pain    Shortness of breath   Date Last Reviewed: 6/1/2017 2000-2017 [employer]. [address] [place] [address], [place] [address]. All rights reserved. This information is not intended as a substitute for professional medical care. Always follow your healthcare professional's instructions.            JOSE R BLAKE MD

## 2018-03-06 LAB
FINAL DIAGNOSIS: NORMAL
HPV HR 12 DNA CVX QL NAA+PROBE: NEGATIVE
HPV16 DNA SPEC QL NAA+PROBE: NEGATIVE
HPV18 DNA SPEC QL NAA+PROBE: NEGATIVE
SPECIMEN DESCRIPTION: NORMAL
SPECIMEN SOURCE CVX/VAG CYTO: NORMAL

## 2018-05-24 ENCOUNTER — OFFICE VISIT (OUTPATIENT)
Dept: FAMILY MEDICINE | Facility: OTHER | Age: 48
End: 2018-05-24
Attending: NURSE PRACTITIONER
Payer: COMMERCIAL

## 2018-05-24 ENCOUNTER — HOSPITAL ENCOUNTER (OUTPATIENT)
Dept: INFUSION THERAPY | Facility: OTHER | Age: 48
End: 2018-05-24
Attending: NURSE PRACTITIONER
Payer: COMMERCIAL

## 2018-05-24 ENCOUNTER — HOSPITAL ENCOUNTER (OUTPATIENT)
Dept: GENERAL RADIOLOGY | Facility: OTHER | Age: 48
Discharge: HOME | End: 2018-05-24
Attending: NURSE PRACTITIONER | Admitting: NURSE PRACTITIONER
Payer: COMMERCIAL

## 2018-05-24 VITALS
SYSTOLIC BLOOD PRESSURE: 144 MMHG | WEIGHT: 172.2 LBS | HEART RATE: 100 BPM | TEMPERATURE: 102.1 F | DIASTOLIC BLOOD PRESSURE: 76 MMHG | BODY MASS INDEX: 28.09 KG/M2

## 2018-05-24 VITALS
SYSTOLIC BLOOD PRESSURE: 117 MMHG | TEMPERATURE: 98.6 F | HEART RATE: 83 BPM | DIASTOLIC BLOOD PRESSURE: 62 MMHG | RESPIRATION RATE: 16 BRPM

## 2018-05-24 DIAGNOSIS — R68.89 FLU-LIKE SYMPTOMS: ICD-10-CM

## 2018-05-24 DIAGNOSIS — E10.9 TYPE 1 DIABETES MELLITUS WITHOUT COMPLICATION (H): Primary | ICD-10-CM

## 2018-05-24 DIAGNOSIS — E10.9 TYPE 1 DIABETES MELLITUS WITHOUT COMPLICATION (H): ICD-10-CM

## 2018-05-24 DIAGNOSIS — R11.0 NAUSEA: Primary | ICD-10-CM

## 2018-05-24 LAB
ALBUMIN SERPL-MCNC: 3.9 G/DL (ref 3.5–5.7)
ALBUMIN UR-MCNC: 100 MG/DL
ALP SERPL-CCNC: 54 U/L (ref 34–104)
ALT SERPL W P-5'-P-CCNC: 33 U/L (ref 7–52)
ANION GAP SERPL CALCULATED.3IONS-SCNC: 8 MMOL/L (ref 3–14)
APPEARANCE UR: CLEAR
AST SERPL W P-5'-P-CCNC: 38 U/L (ref 13–39)
BACTERIA #/AREA URNS HPF: ABNORMAL /HPF
BASOPHILS # BLD AUTO: 0 10E9/L (ref 0–0.2)
BASOPHILS NFR BLD AUTO: 0.5 %
BILIRUB SERPL-MCNC: 0.7 MG/DL (ref 0.3–1)
BILIRUB UR QL STRIP: ABNORMAL
BUN SERPL-MCNC: 8 MG/DL (ref 7–25)
CALCIUM SERPL-MCNC: 8.9 MG/DL (ref 8.6–10.3)
CHLORIDE SERPL-SCNC: 98 MMOL/L (ref 98–107)
CO2 SERPL-SCNC: 25 MMOL/L (ref 21–31)
COLOR UR AUTO: ABNORMAL
CREAT SERPL-MCNC: 0.83 MG/DL (ref 0.6–1.2)
DEPRECATED S PYO AG THROAT QL EIA: NORMAL
DIFFERENTIAL METHOD BLD: ABNORMAL
EOSINOPHIL # BLD AUTO: 0 10E9/L (ref 0–0.7)
EOSINOPHIL NFR BLD AUTO: 0 %
ERYTHROCYTE [DISTWIDTH] IN BLOOD BY AUTOMATED COUNT: 12.1 % (ref 10–15)
FLUAV+FLUBV RNA SPEC QL NAA+PROBE: NEGATIVE
FLUAV+FLUBV RNA SPEC QL NAA+PROBE: NEGATIVE
GFR SERPL CREATININE-BSD FRML MDRD: 73 ML/MIN/1.7M2
GLUCOSE SERPL-MCNC: 242 MG/DL (ref 70–105)
GLUCOSE UR STRIP-MCNC: 100 MG/DL
HCT VFR BLD AUTO: 37.6 % (ref 35–47)
HGB BLD-MCNC: 13.1 G/DL (ref 11.7–15.7)
HGB UR QL STRIP: ABNORMAL
IMM GRANULOCYTES # BLD: 0 10E9/L (ref 0–0.4)
IMM GRANULOCYTES NFR BLD: 0.5 %
KETONES UR STRIP-MCNC: >80 MG/DL
LEUKOCYTE ESTERASE UR QL STRIP: ABNORMAL
LYMPHOCYTES # BLD AUTO: 0.3 10E9/L (ref 0.8–5.3)
LYMPHOCYTES NFR BLD AUTO: 13.1 %
MCH RBC QN AUTO: 31.6 PG (ref 26.5–33)
MCHC RBC AUTO-ENTMCNC: 34.8 G/DL (ref 31.5–36.5)
MCV RBC AUTO: 91 FL (ref 78–100)
MONOCYTES # BLD AUTO: 0.1 10E9/L (ref 0–1.3)
MONOCYTES NFR BLD AUTO: 2.5 %
NEUTROPHILS # BLD AUTO: 1.7 10E9/L (ref 1.6–8.3)
NEUTROPHILS NFR BLD AUTO: 83.4 %
NITRATE UR QL: NEGATIVE
NON-SQ EPI CELLS #/AREA URNS LPF: ABNORMAL /LPF
PH UR STRIP: 5.5 PH (ref 5–7)
PLATELET # BLD AUTO: 118 10E9/L (ref 150–450)
POTASSIUM SERPL-SCNC: 3.6 MMOL/L (ref 3.5–5.1)
PROT SERPL-MCNC: 6.9 G/DL (ref 6.4–8.9)
RBC # BLD AUTO: 4.15 10E12/L (ref 3.8–5.2)
RBC #/AREA URNS AUTO: ABNORMAL /HPF
RSV RNA SPEC NAA+PROBE: NEGATIVE
SODIUM SERPL-SCNC: 131 MMOL/L (ref 134–144)
SOURCE: ABNORMAL
SP GR UR STRIP: >1.03 (ref 1–1.03)
SPECIMEN SOURCE: NORMAL
SPECIMEN SOURCE: NORMAL
UROBILINOGEN UR STRIP-ACNC: 4 EU/DL (ref 0.2–1)
WBC # BLD AUTO: 2 10E9/L (ref 4–11)
WBC #/AREA URNS AUTO: ABNORMAL /HPF

## 2018-05-24 PROCEDURE — 87880 STREP A ASSAY W/OPTIC: CPT | Performed by: NURSE PRACTITIONER

## 2018-05-24 PROCEDURE — 80053 COMPREHEN METABOLIC PANEL: CPT | Performed by: NURSE PRACTITIONER

## 2018-05-24 PROCEDURE — 87798 DETECT AGENT NOS DNA AMP: CPT | Performed by: NURSE PRACTITIONER

## 2018-05-24 PROCEDURE — 25000132 ZZH RX MED GY IP 250 OP 250 PS 637: Performed by: NURSE PRACTITIONER

## 2018-05-24 PROCEDURE — 25000128 H RX IP 250 OP 636: Performed by: NURSE PRACTITIONER

## 2018-05-24 PROCEDURE — 71046 X-RAY EXAM CHEST 2 VIEWS: CPT

## 2018-05-24 PROCEDURE — 81001 URINALYSIS AUTO W/SCOPE: CPT | Performed by: NURSE PRACTITIONER

## 2018-05-24 PROCEDURE — 99215 OFFICE O/P EST HI 40 MIN: CPT | Performed by: NURSE PRACTITIONER

## 2018-05-24 PROCEDURE — 87631 RESP VIRUS 3-5 TARGETS: CPT | Performed by: NURSE PRACTITIONER

## 2018-05-24 PROCEDURE — 86618 LYME DISEASE ANTIBODY: CPT | Performed by: NURSE PRACTITIONER

## 2018-05-24 PROCEDURE — 36415 COLL VENOUS BLD VENIPUNCTURE: CPT | Performed by: NURSE PRACTITIONER

## 2018-05-24 PROCEDURE — 85025 COMPLETE CBC W/AUTO DIFF WBC: CPT | Performed by: NURSE PRACTITIONER

## 2018-05-24 PROCEDURE — 25000125 ZZHC RX 250: Performed by: NURSE PRACTITIONER

## 2018-05-24 PROCEDURE — 96372 THER/PROPH/DIAG INJ SC/IM: CPT

## 2018-05-24 RX ORDER — DOXYCYCLINE 100 MG/1
100 CAPSULE ORAL 2 TIMES DAILY
Qty: 20 CAPSULE | Refills: 0 | Status: SHIPPED | OUTPATIENT
Start: 2018-05-24 | End: 2018-06-03

## 2018-05-24 RX ORDER — ONDANSETRON 4 MG/1
8 TABLET, ORALLY DISINTEGRATING ORAL ONCE
Status: COMPLETED | OUTPATIENT
Start: 2018-05-24 | End: 2018-05-24

## 2018-05-24 RX ORDER — ACETAMINOPHEN 500 MG
1000 TABLET ORAL ONCE
Status: COMPLETED | OUTPATIENT
Start: 2018-05-24 | End: 2018-05-24

## 2018-05-24 RX ORDER — CEFTRIAXONE SODIUM 1 G
1 VIAL (EA) INJECTION ONCE
Status: COMPLETED | OUTPATIENT
Start: 2018-05-24 | End: 2018-05-24

## 2018-05-24 RX ORDER — ONDANSETRON 8 MG/1
8 TABLET, ORALLY DISINTEGRATING ORAL EVERY 8 HOURS PRN
Qty: 20 TABLET | Refills: 0 | Status: SHIPPED | OUTPATIENT
Start: 2018-05-24 | End: 2018-09-07

## 2018-05-24 RX ADMIN — ONDANSETRON 8 MG: 4 TABLET, ORALLY DISINTEGRATING ORAL at 10:45

## 2018-05-24 RX ADMIN — LIDOCAINE HYDROCHLORIDE 1 G: 10 INJECTION, SOLUTION EPIDURAL; INFILTRATION; INTRACAUDAL; PERINEURAL at 14:24

## 2018-05-24 RX ADMIN — SODIUM CHLORIDE 1000 ML: 900 INJECTION, SOLUTION INTRAVENOUS at 12:40

## 2018-05-24 RX ADMIN — ACETAMINOPHEN 1000 MG: 500 TABLET, COATED ORAL at 11:25

## 2018-05-24 ASSESSMENT — ENCOUNTER SYMPTOMS
MYALGIAS: 1
HEADACHES: 1
FEVER: 1
FATIGUE: 1
DIZZINESS: 1
NAUSEA: 1
APPETITE CHANGE: 1

## 2018-05-24 NOTE — PROGRESS NOTES
SUBJECTIVE:   Floresita Hill is a 48 year old female who presents to clinic today for the following health issues:    Presents to clinic going on third day of generalized nausea, headache, fever 102 intermittently generalized body ache and pressure in her left ear.  Having some positional vertigo symptoms sitting to standing or rolling over in bed abruptly.  Has not vomited however has been eating and drinking minimally due to nausea.  Is a diabetic manages with glucometer and insulin pump, has been running in the 200s  Has 2 children in school and many exposures however no family members have been ill  Has been out in the woods bird watching over the past week and many tick exposures    HPI    Patient Active Problem List   Diagnosis     Dysthymic disorder     Hashimoto's thyroiditis     Iron deficiency anemia     IUD (intrauterine device) in place     Microcytic anemia     Mixed hyperlipidemia     Type 1 diabetes mellitus without complication (H)     Past Surgical History:   Procedure Laterality Date     OTHER SURGICAL HISTORY      852094,DILATION AND CURETTAGE,x3     OTHER SURGICAL HISTORY      [id number],TMJ ARTHOTOMY,hx of TMJ surgery       Social History   Substance Use Topics     Smoking status: Never Smoker     Smokeless tobacco: Never Used     Alcohol use 1.2 oz/week      Comment: Alcoholic Drinks/day: Glass of wine daily     Family History   Problem Relation Age of Onset     Other - See Comments Mother      Myesthenia gravis/Rare kidney disease, on transplant list     Breast Cancer Maternal Aunt      Cancer-breast     Other - See Comments Sister      Psychiatric illness,Bipolar     Thyroid Disease Sister      Thyroid Disease     Other - See Comments Sister      Migraines         Current Outpatient Prescriptions   Medication Sig Dispense Refill     aspirin EC 81 MG EC tablet Take 81 mg by mouth daily with food       blood glucose monitoring (ACCU-CHEK JESICA PLUS) test strip USE ONE STRIP TO  "CHECK GLUCOSE FIVE TIMES DAILY.       Blood Glucose Monitoring Suppl ([employer] BLOOD GLUCOSE MONITOR) W/DEVICE KIT Dispense glucose meter, test strips and lancets covered by the patient insurance. Test 5 times per day. 3 month supply with 4 refills.       doxycycline (VIBRAMYCIN) 100 MG capsule Take 1 capsule (100 mg) by mouth 2 times daily for 10 days 20 capsule 0     FLUoxetine (PROZAC) 20 MG capsule Take 1 capsule (20 mg) by mouth every morning 90 capsule 3     insulin aspart (NOVOLOG) 100 UNITS/ML injection per pump       insulin glargine (LANTUS) 100 UNIT/ML injection Inject 20 Units Subcutaneous At Bedtime Before bedtime 3 vial 3     insulin syringe-needle U-100 (B-D INSULIN SYRINGE ULTRAFINE) 29G X 1/2\" 0.5 ML For administering insulin at home.       levothyroxine (SYNTHROID/LEVOTHROID) 100 MCG tablet Take 1 tablet (100 mcg) by mouth every morning (before breakfast) 90 tablet 3     ondansetron (ZOFRAN ODT) 8 MG ODT tab Take 1 tablet (8 mg) by mouth every 8 hours as needed for nausea 20 tablet 0     simvastatin (ZOCOR) 10 MG tablet Take 1 tablet (10 mg) by mouth At Bedtime 90 tablet 3     triamcinolone (KENALOG) 0.1 % cream        Allergies   Allergen Reactions     Codeine Unknown     Loses consciousness     BP Readings from Last 3 Encounters:   05/24/18 117/62   05/24/18 144/76   03/02/18 122/74    Wt Readings from Last 3 Encounters:   05/24/18 172 lb 3.2 oz (78.1 kg)   03/02/18 169 lb (76.7 kg)   10/26/17 161 lb (73 kg)                  Labs reviewed in EPIC    Review of Systems   Constitutional: Positive for appetite change, fatigue and fever.   HENT: Positive for ear pain.    Gastrointestinal: Positive for nausea.   Musculoskeletal: Positive for myalgias.   Neurological: Positive for dizziness and headaches.   All other systems reviewed and are negative.       OBJECTIVE:     /76  Pulse 100  Temp 102.1  F (38.9  C)  Wt 172 lb 3.2 oz (78.1 kg)  LMP 05/21/2018  Breastfeeding? No  BMI 28.09 " kg/m2  Body mass index is 28.09 kg/(m^2).  Physical Exam   Constitutional: She is oriented to person, place, and time. She appears well-developed and well-nourished.   HENT:   Head: Normocephalic and atraumatic.   Mouth/Throat: Oropharynx is clear and moist.   TMs dull with fluid bulge, no evidence of erythema   Eyes: Conjunctivae and EOM are normal. Pupils are equal, round, and reactive to light. Right eye exhibits no discharge. Left eye exhibits no discharge. No scleral icterus.   Neck: Normal range of motion. Neck supple.   Neck is soft and supple with full range of motion no evidence of any meningeal irritation or tenderness with flexion and extension   Cardiovascular: Normal rate, regular rhythm and normal heart sounds.    Pulmonary/Chest: Effort normal and breath sounds normal.   Musculoskeletal: Normal range of motion.   Lymphadenopathy:     She has no cervical adenopathy.   Neurological: She is alert and oriented to person, place, and time. She displays normal reflexes. No cranial nerve deficit. She exhibits normal muscle tone. Coordination normal.   Skin: Skin is warm and dry.   Psychiatric: She has a normal mood and affect. Her behavior is normal. Judgment and thought content normal.   Nursing note and vitals reviewed.      Results for orders placed or performed in visit on 05/24/18   XR Chest 2 Views    Narrative    PROCEDURE:  XR CHEST 2 VW    HISTORY: ; Flu-like symptoms, .    COMPARISON:  None.    FINDINGS:  The cardiomediastinal contours are normal.  The trachea is midline.  No focal consolidation, effusion or pneumothorax.    No suspicious osseous lesion or subdiaphragmatic free air.      Impression    IMPRESSION:      No acute cardiopulmonary process.      JESSICA PULIDO MD   CBC and Differential   Result Value Ref Range    WBC 2.0 (L) 4.0 - 11.0 10e9/L    RBC Count 4.15 3.8 - 5.2 10e12/L    Hemoglobin 13.1 11.7 - 15.7 g/dL    Hematocrit 37.6 35.0 - 47.0 %    MCV 91 78 - 100 fl    MCH 31.6 26.5  - 33.0 pg    MCHC 34.8 31.5 - 36.5 g/dL    RDW 12.1 10.0 - 15.0 %    Platelet Count 118 (L) 150 - 450 10e9/L    Diff Method Automated Method     % Neutrophils 83.4 %    % Lymphocytes 13.1 %    % Monocytes 2.5 %    % Eosinophils 0.0 %    % Basophils 0.5 %    % Immature Granulocytes 0.5 %    Absolute Neutrophil 1.7 1.6 - 8.3 10e9/L    Absolute Lymphocytes 0.3 (L) 0.8 - 5.3 10e9/L    Absolute Monocytes 0.1 0.0 - 1.3 10e9/L    Absolute Eosinophils 0.0 0.0 - 0.7 10e9/L    Absolute Basophils 0.0 0.0 - 0.2 10e9/L    Abs Immature Granulocytes 0.0 0 - 0.4 10e9/L   Comprehensive metabolic panel   Result Value Ref Range    Sodium 131 (L) 134 - 144 mmol/L    Potassium 3.6 3.5 - 5.1 mmol/L    Chloride 98 98 - 107 mmol/L    Carbon Dioxide 25 21 - 31 mmol/L    Anion Gap 8 3 - 14 mmol/L    Glucose 242 (H) 70 - 105 mg/dL    Urea Nitrogen 8 7 - 25 mg/dL    Creatinine 0.83 0.60 - 1.20 mg/dL    GFR Estimate 73 >60 mL/min/1.7m2    GFR Estimate If Black 88 >60 mL/min/1.7m2    Calcium 8.9 8.6 - 10.3 mg/dL    Bilirubin Total 0.7 0.3 - 1.0 mg/dL    Albumin 3.9 3.5 - 5.7 g/dL    Protein Total 6.9 6.4 - 8.9 g/dL    Alkaline Phosphatase 54 34 - 104 U/L    ALT 33 7 - 52 U/L    AST 38 13 - 39 U/L   *UA reflex to Microscopic   Result Value Ref Range    Color Urine Orange     Appearance Urine Clear     Glucose Urine 100 (A) NEG^Negative mg/dL    Bilirubin Urine Moderate (A) NEG^Negative    Ketones Urine >80 (A) NEG^Negative mg/dL    Specific Gravity Urine >1.030 1.003 - 1.035    Blood Urine Large (A) NEG^Negative    pH Urine 5.5 5.0 - 7.0 pH    Protein Albumin Urine 100 (A) NEG^Negative mg/dL    Urobilinogen Urine 4.0 (H) 0.2 - 1.0 EU/dL    Nitrite Urine Negative NEG^Negative    Leukocyte Esterase Urine Trace (A) NEG^Negative    Source Midstream Urine    Urine Microscopic   Result Value Ref Range    WBC Urine 5-10 (A) OTO5^0 - 5 /HPF    RBC Urine  (A) OTO2^O - 2 /HPF    Squamous Epithelial /LPF Urine Few FEW^Few /LPF    Bacteria Urine Few  (A) NEG^Negative /HPF   Influenza A and B and RSV PCR   Result Value Ref Range    Specimen Description Nasal     Influenza A PCR Negative NEG^Negative    Influenza B PCR Negative NEG^Negative    Resp Syncytial Virus Negative NEG^Negative   Strep, Rapid Screen   Result Value Ref Range    Specimen Description Throat     Rapid Strep A Screen       Negative presumptive for Group A Beta Streptococcus       Reviewed labs with patient leukopenia and thrombocytopenia, highly suspicious for tikc disease, probably anaplasmosis    Good response to Tylenol extra strength  Nausea controlled with Zofran    Took at least three  8 ounce glasses of water during office visit without any difficulty    ASSESSMENT/PLAN:       Transferred to infusion therapy given 1 L of normal saline, ate full lunch tray  Reports feeling much better no nausea, no positional vertigo or nausea    Discussed plan--will give Rocephin 1 g in clinic  Start doxycycline twice daily ×10 days  Await tickborne illness labs should be back in 2-3 days    Discussed treatment for tickborne illness would expect some improvement 24-48 hours with daily improvement  If no improvement or feeling worse should return to clinic or ER     Very agreeable with above plan, feels comfortable managing at home    Stands importance of maintaining hydration and managing diabetes with insulin pump    1. Nausea  Influenza A and B and RSV PCR  - CBC and Differential  - Comprehensive metabolic panel  - Strep, Rapid Screen  - *UA reflex to Microscopic  - Urine Microscopic  - ondansetron (ZOFRAN ODT) 8 MG ODT tab; Take 1 tablet (8 mg) by mouth every 8 hours as needed for nausea  Dispense: 20 tablet; Refill: 0    2. Type 1 diabetes mellitus without complication (H)  - Influenza A and B and RSV PCR  - CBC and Differential  - Comprehensive metabolic panel  - Strep, Rapid Screen  - ondansetron (ZOFRAN ODT) 8 MG ODT tab; Take 1 tablet (8 mg) by mouth every 8 hours as needed for nausea  Dispense:  20 tablet; Refill: 0    3. Flu-like symptoms    - Influenza A and B and RSV PCR  - CBC and Differential  - Comprehensive metabolic panel  - Strep, Rapid Screen  - *UA reflex to Microscopic  - Lyme Disease Dorinda with reflex to WB Serum  - Ehrlichia Anaplasma Sp by PCR  - XR Chest 2 Views  - ondansetron (ZOFRAN ODT) 8 MG ODT tab; Take 1 tablet (8 mg) by mouth every 8 hours as needed for nausea  Dispense: 20 tablet; Refill: 0  - doxycycline (VIBRAMYCIN) 100 MG capsule; Take 1 capsule (100 mg) by mouth 2 times daily for 10 days  Dispense: 20 capsule; Refill: 0      JAHAIRA [facility] AND [facility]

## 2018-05-24 NOTE — PROGRESS NOTES
Infusion Nursing Note:  Floresita Hill presents today for Fluid Bolus.    Patient seen by provider today: Yes: Dana Winkler NP   present during visit today: Not Applicable.    Note: Bolus was done at 1340. Monitored for 20 minutes. Kept lunch down. Up to bathroom, with only slight nausea. Denies dizziness. VSS. Report given to Dana Winkler and she wanted her to go back to the clinic. Escorted by her nurse, Madhuri.    Intravenous Access:  Peripheral IV placed.    Treatment Conditions:  Not Applicable.      Post Infusion Assessment:  Patient tolerated infusion without incident.  Blood return noted pre and post infusion.  Site patent and intact, free from redness, edema or discomfort.  No evidence of extravasations.  Access discontinued per protocol.    Discharge Plan:   Patient discharged in stable condition accompanied by: KATIA Dhaliwal.  Departure Mode: Ambulatory.    Brenda J. Goodell, RN

## 2018-05-24 NOTE — NURSING NOTE
Patient presents to clinic with fever of a 102.1, headache, chills, dizziness. These symptoms stated Tuesday.  Chasity Leung ....................  5/24/2018   10:05 AM

## 2018-05-24 NOTE — MR AVS SNAPSHOT
After Visit Summary   5/24/2018    Floresita Hill    MRN: [id number]           Patient Information     Date Of Birth          1970        Visit Information        Provider Department      5/24/2018 10:00 AM Kathy Winkler APRN CNP [facility]        Today's Diagnoses     Nausea    -  1    Type 1 diabetes mellitus without complication (H)        Flu-like symptoms          Care Instructions    Continue to push fluids to maintain hydration--- use your Zofran to manage nausea as needed  You may want to use this a couple of times today starting your medication    Start doxycycline after dinner tonight and then twice daily after meals    Tylenol extra strength 2 tabs as needed up to 3000 mg in 24 hours for body ache or fever    If at any time you are feeling weak, unable to tolerate any oral fluids go to ER immediately    I will release tickborne disease labs when they arrive and other labs    Expect some improvement in 24-48 hours on medication with daily improvement--- if no improvement but not worsening return to clinic              Follow-ups after your visit        Follow-up notes from your care team     Return if symptoms worsen or fail to improve.      Who to contact     If you have questions or need follow up information about today's clinic visit or your schedule please contact [facility] directly at [phone].  Normal or non-critical lab and imaging results will be communicated to you by MyChart, letter or phone within 4 business days after the clinic has received the results. If you do not hear from us within 7 days, please contact the clinic through MyChart or phone. If you have a critical or abnormal lab result, we will notify you by phone as soon as possible.  Submit refill requests through [employer] or call your pharmacy and they will forward the refill request to us. Please allow 3 business days for your refill to be completed.           Additional Information About Your Visit        [employer] Information     [employer] gives you secure access to your electronic health record. If you see a primary care provider, you can also send messages to your care team and make appointments. If you have questions, please call your primary care clinic.  If you do not have a primary care provider, please call [phone] and they will assist you.        Care EveryWhere ID     This is your Care EveryWhere ID. This could be used by other organizations to access your [place] medical records  [id number]        Your Vitals Were     Pulse Temperature Last Period Breastfeeding? BMI (Body Mass Index)       100 102.1  F (38.9  C) 05/21/2018 No 28.09 kg/m2        Blood Pressure from Last 3 Encounters:   05/24/18 117/62   05/24/18 144/76   03/02/18 122/74    Weight from Last 3 Encounters:   05/24/18 172 lb 3.2 oz (78.1 kg)   03/02/18 169 lb (76.7 kg)   10/26/17 161 lb (73 kg)              We Performed the Following     *UA reflex to Microscopic     CBC and Differential     Comprehensive metabolic panel     Ehrlichia Anaplasma Sp by PCR     Influenza A and B and RSV PCR     Lyme Disease Dorinda with reflex to WB Serum     Strep, Rapid Screen     Urine Microscopic     XR Chest 2 Views          Today's Medication Changes          These changes are accurate as of 5/24/18  2:29 PM.  If you have any questions, ask your nurse or doctor.               Start taking these medicines.        Dose/Directions    doxycycline 100 MG capsule   Commonly known as:  VIBRAMYCIN   Used for:  Flu-like symptoms   Started by:  Kathy Winkler APRN CNP        Dose:  100 mg   Take 1 capsule (100 mg) by mouth 2 times daily for 10 days   Quantity:  20 capsule   Refills:  0       ondansetron 8 MG ODT tab   Commonly known as:  ZOFRAN ODT   Used for:  Nausea, Flu-like symptoms, Type 1 diabetes mellitus without complication (H)   Started by:  Kathy Winkler APRN CNP        Dose:  8 mg   Take 1 tablet  (8 mg) by mouth every 8 hours as needed for nausea   Quantity:  20 tablet   Refills:  0            Where to get your medicines      These medications were sent to [facility], - [place], [place] - [address]  [address], [place] [address]     Phone:  [phone]     doxycycline 100 MG capsule    ondansetron 8 MG ODT tab                Primary Care Provider Office Phone # Fax #    Mary Denson [phone]       [address]  [place] [address]        Equal Access to Services     [facility]: Hadii aad ku hadasho Soomaali, waaxda luqadaha, qaybta kaalmada adeyolieyaok, yessy montaño . [facility] [phone].    ATENCIÓN: Si habla español, tiene a vega disposición servicios gratuitos de asistencia lingüística. [facility] [phone].    We comply with applicable federal civil rights laws and [place] laws. We do not discriminate on the basis of race, color, national origin, age, disability, sex, sexual orientation, or gender identity.            Thank you!     Thank you for choosing [facility] AND [facility]  for your care. Our goal is always to provide you with excellent care. Hearing back from our patients is one way we can continue to improve our services. Please take a few minutes to complete the written survey that you may receive in the mail after your visit with us. Thank you!             Your Updated Medication List - Protect others around you: Learn how to safely use, store and throw away your medicines at www.disposemymeds.org.          This list is accurate as of 5/24/18  2:29 PM.  Always use your most recent med list.                   Brand Name Dispense Instructions for use Diagnosis    ACCU-CHEK JESICA PLUS test strip   Generic drug:  blood glucose monitoring      USE ONE STRIP TO CHECK GLUCOSE FIVE TIMES DAILY.        aspirin 81 MG EC tablet      Take 81 mg by mouth daily with food        B-D  "INSULIN SYRINGE ULTRAFINE 29G X 1/2\" 0.5 ML   Generic drug:  insulin syringe-needle U-100      For administering insulin at home.        doxycycline 100 MG capsule    VIBRAMYCIN    20 capsule    Take 1 capsule (100 mg) by mouth 2 times daily for 10 days    Flu-like symptoms       FLUoxetine 20 MG capsule    PROzac    90 capsule    Take 1 capsule (20 mg) by mouth every morning    Dysthymic disorder       insulin aspart 100 UNITS/ML injection    NovoLOG     per pump        insulin glargine 100 UNIT/ML injection    LANTUS    3 vial    Inject 20 Units Subcutaneous At Bedtime Before bedtime    Type 1 diabetes mellitus without complication (H)       levothyroxine 100 MCG tablet    SYNTHROID/LEVOTHROID    90 tablet    Take 1 tablet (100 mcg) by mouth every morning (before breakfast)    Hashimoto's thyroiditis       ondansetron 8 MG ODT tab    ZOFRAN ODT    20 tablet    Take 1 tablet (8 mg) by mouth every 8 hours as needed for nausea    Nausea, Flu-like symptoms, Type 1 diabetes mellitus without complication (H)       simvastatin 10 MG tablet    ZOCOR    90 tablet    Take 1 tablet (10 mg) by mouth At Bedtime    Type 1 diabetes mellitus without complication (H)       [employer] BLOOD GLUCOSE MONITOR w/Device Kit      Dispense glucose meter, test strips and lancets covered by the patient insurance. Test 5 times per day. 3 month supply with 4 refills.        triamcinolone 0.1 % cream    KENALOG            "

## 2018-05-24 NOTE — PATIENT INSTRUCTIONS
Continue to push fluids to maintain hydration--- use your Zofran to manage nausea as needed  You may want to use this a couple of times today starting your medication    Start doxycycline after dinner tonight and then twice daily after meals    Tylenol extra strength 2 tabs as needed up to 3000 mg in 24 hours for body ache or fever    If at any time you are feeling weak, unable to tolerate any oral fluids go to ER immediately    I will release tickborne disease labs when they arrive and other labs    Expect some improvement in 24-48 hours on medication with daily improvement--- if no improvement but not worsening return to clinic

## 2018-05-25 ENCOUNTER — TELEPHONE (OUTPATIENT)
Dept: FAMILY MEDICINE | Facility: OTHER | Age: 48
End: 2018-05-25

## 2018-05-25 LAB — B BURGDOR IGG+IGM SER QL: 0.03 (ref 0–0.89)

## 2018-05-25 NOTE — TELEPHONE ENCOUNTER
Spoke with patient to see how she was feeling today and how her night went.     She states she is feeling so much better. The fever is still off and on but she states she has improved so much and is so thankful for us taking care of her yesterday.     She will call if she needs anything else.   Madhuri Rice LPN........................5/25/2018  8:29 AM

## 2018-05-28 LAB
A PHAGOCYTOPH DNA BLD QL NAA+PROBE: DETECTED
E CHAFFEENSIS DNA BLD QL NAA+PROBE: NOT DETECTED
E EWINGII DNA SPEC QL NAA+PROBE: NOT DETECTED
EHRLICHIA DNA SPEC QL NAA+PROBE: NOT DETECTED

## 2018-05-30 DIAGNOSIS — E10.9 TYPE 1 DIABETES MELLITUS WITHOUT COMPLICATION (H): Primary | ICD-10-CM

## 2018-05-30 NOTE — LETTER
June 5, 2018      Floresita Hill  [address] [facility] [address]        Dear Floresita,     A refill of your insulin has been requested by your pharmacy and it appears you are a former patient of .  As   is no longer with the clinic, we ask that you please contact [facility] to establish care with another provider.     The refill request for this medication can then be discussed and addressed accordingly with your new primary care physician.  Your health is very important to us.  Please call the clinic at [phone] to schedule your appointment.    Thank you for choosing [facility] and [facility] for your health care needs.    Sincerely,    Refill RN  [facility]

## 2018-06-05 NOTE — TELEPHONE ENCOUNTER
Medication and diagnosis reviewed and discussed 03/02/2018, pt is followed by endocrinology in [place].  Letter sent for reminder to establish care with new provider  90 day supply provided  Carolynn Vazquez RN.............................6/5/2018 1:12 PM

## 2018-07-23 NOTE — PROGRESS NOTES
Patient Information     Patient Name  Floresita Hui MRN  [id number] Sex  Female   1970      Letter by Mary Denson MD at      Author:  Mary Denson MD Service:  (none) Author Type:  (none)    Filed:   Encounter Date:  11/3/2017 Status:  (Other)           Floresita Hui  [address] [facility] [address]          2017    Dear Ms. Hui:    This letter is to remind you that you are due for your annual exam with Mary Denson MD. Your last comprehensive visit was more than 12 months ago.    A LIMITED refill of   Orders Placed This Encounter      FLUoxetine (PROZAC) 20 mg capsule has been called into your pharmacy. Additional refills require you to complete this appointment.    Please call the clinic at [phone] to schedule your appointment.    If you should require additional refills before your scheduled appointment, please contact your pharmacy and we will refill your medication until the date of your appointment.    If you are no longer seeing Mary Denson MD for primary care, please call to let us know. Doing so will remove you from our call/contact list.      Thank you for choosing [facility] and [facility] for your health care needs.    Sincerely,    Refill RN  [facility]

## 2018-07-23 NOTE — PROGRESS NOTES
Patient Information     Patient Name  Floresita Hui MRN  [id number] Sex  Female   1970      Letter by Mary Denson MD at      Author:  Mary Denson MD Service:  (none) Author Type:  (none)    Filed:   Encounter Date:  4/3/2017 Status:  (Other)           Floresita Hui  [address] [facility] [address]          2019    Dear Ms. Hui:    This letter is to remind you that you are due for your diabetic exam with Mary Denson MD . Your last comprehensive diabetic visit was more than 6 months ago.     A LIMITED refill of simvastatin (ZOCOR) 20 mg tablet has been called into your pharmacy.    Additional refills require a diabetic management and lab appointment with Mary Denson MD. Please call the clinic at [phone] to schedule your appointment.    Thank you,    The Refill Nurse  [facility]

## 2018-09-07 ENCOUNTER — OFFICE VISIT (OUTPATIENT)
Dept: FAMILY MEDICINE | Facility: OTHER | Age: 48
End: 2018-09-07
Attending: NURSE PRACTITIONER
Payer: COMMERCIAL

## 2018-09-07 VITALS — DIASTOLIC BLOOD PRESSURE: 82 MMHG | HEART RATE: 80 BPM | SYSTOLIC BLOOD PRESSURE: 122 MMHG

## 2018-09-07 DIAGNOSIS — R39.9 UTI SYMPTOMS: Primary | ICD-10-CM

## 2018-09-07 LAB
ALBUMIN UR-MCNC: NEGATIVE MG/DL
APPEARANCE UR: CLEAR
BILIRUB UR QL STRIP: NEGATIVE
COLOR UR AUTO: YELLOW
GLUCOSE UR STRIP-MCNC: NEGATIVE MG/DL
HGB UR QL STRIP: NEGATIVE
KETONES UR STRIP-MCNC: NEGATIVE MG/DL
LEUKOCYTE ESTERASE UR QL STRIP: NEGATIVE
NITRATE UR QL: NEGATIVE
PH UR STRIP: 6.5 PH (ref 5–9)
SOURCE: NORMAL
SP GR UR STRIP: <1.005 (ref 1–1.03)
UROBILINOGEN UR STRIP-ACNC: 1 EU/DL (ref 0.2–1)

## 2018-09-07 PROCEDURE — 81003 URINALYSIS AUTO W/O SCOPE: CPT | Performed by: NURSE PRACTITIONER

## 2018-09-07 PROCEDURE — 99213 OFFICE O/P EST LOW 20 MIN: CPT | Performed by: NURSE PRACTITIONER

## 2018-09-07 ASSESSMENT — ANXIETY QUESTIONNAIRES
2. NOT BEING ABLE TO STOP OR CONTROL WORRYING: NOT AT ALL
4. TROUBLE RELAXING: NOT AT ALL
5. BEING SO RESTLESS THAT IT IS HARD TO SIT STILL: NOT AT ALL
6. BECOMING EASILY ANNOYED OR IRRITABLE: NOT AT ALL
1. FEELING NERVOUS, ANXIOUS, OR ON EDGE: NOT AT ALL
GAD7 TOTAL SCORE: 0
IF YOU CHECKED OFF ANY PROBLEMS ON THIS QUESTIONNAIRE, HOW DIFFICULT HAVE THESE PROBLEMS MADE IT FOR YOU TO DO YOUR WORK, TAKE CARE OF THINGS AT HOME, OR GET ALONG WITH OTHER PEOPLE: NOT DIFFICULT AT ALL
7. FEELING AFRAID AS IF SOMETHING AWFUL MIGHT HAPPEN: NOT AT ALL
3. WORRYING TOO MUCH ABOUT DIFFERENT THINGS: NOT AT ALL

## 2018-09-07 ASSESSMENT — PAIN SCALES - GENERAL: PAINLEVEL: NO PAIN (1)

## 2018-09-07 NOTE — NURSING NOTE
Patient presents to the clinic for a possible UTI. Patient states it started about a week ago, and states that there is some burning at times and some frequency to go.  Guanaco Cummings ..............9/7/2018 9:03 AM

## 2018-09-07 NOTE — PROGRESS NOTES
SUBJECTIVE:   Floresita Hill is a 48 year old female who presents to clinic today for the following health issues:    URINARY TRACT SYMPTOMS    Onset: Earlier this week    Description:   Painful urination (Dysuria): YES  Blood in urine (Hematuria): no   Delay in urine (Hesitency): no   Frequency: YES  Urgency: YES    Intensity: mild    Progression of Symptoms:  same    Accompanying Signs & Symptoms:  Fever/chills: no   Flank pain no   Nausea and vomiting: no   Any vaginal symptoms: none  Abdominal/Pelvic Pain: lower abdominal pressure    History:   History of frequent UTI's: In her younger years  History of kidney stones: No  Sexually Active: YES  Possibility of pregnancy: No    Precipitating factors: None    Therapies Tried and outcome: None        Problem list and histories reviewed & adjusted, as indicated.  Additional history: as documented    Patient Active Problem List   Diagnosis     Dysthymic disorder     Hashimoto's thyroiditis     Iron deficiency anemia     IUD (intrauterine device) in place     Microcytic anemia     Mixed hyperlipidemia     Type 1 diabetes mellitus without complication (H)     Past Surgical History:   Procedure Laterality Date     OTHER SURGICAL HISTORY      [id number],DILATION AND CURETTAGE,x3     OTHER SURGICAL HISTORY      QUR059,TMJ ARTHOTOMY,hx of TMJ surgery       Social History   Substance Use Topics     Smoking status: Never Smoker     Smokeless tobacco: Never Used     Alcohol use 1.2 oz/week      Comment: Alcoholic Drinks/day: Glass of wine daily     Family History   Problem Relation Age of Onset     Other - See Comments Mother      Myesthenia gravis/Rare kidney disease, on transplant list     Breast Cancer Maternal Aunt      Cancer-breast     Other - See Comments Sister      Psychiatric illness,Bipolar     Thyroid Disease Sister      Thyroid Disease     Other - See Comments Sister      Migraines         Current Outpatient Prescriptions   Medication Sig Dispense  "Refill     aspirin EC 81 MG EC tablet Take 81 mg by mouth daily with food       blood glucose monitoring (ACCU-CHEK JESICA PLUS) test strip USE ONE STRIP TO CHECK GLUCOSE FIVE TIMES DAILY.       blood glucose monitoring (NO BRAND SPECIFIED) test strip Use to test blood sugars 5 times daily with pump in Vitro 90 days 450 strip 11     Blood Glucose Monitoring Suppl ([employer] BLOOD GLUCOSE MONITOR) W/DEVICE KIT Dispense glucose meter, test strips and lancets covered by the patient insurance. Test 5 times per day. 3 month supply with 4 refills.       FLUoxetine (PROZAC) 20 MG capsule Take 1 capsule (20 mg) by mouth every morning 90 capsule 3     insulin glargine (LANTUS) 100 UNIT/ML injection Inject 20 Units Subcutaneous At Bedtime Before bedtime 3 vial 3     insulin syringe-needle U-100 (B-D INSULIN SYRINGE ULTRAFINE) 29G X 1/2\" 0.5 ML For administering insulin at home.       levothyroxine (SYNTHROID/LEVOTHROID) 100 MCG tablet Take 1 tablet (100 mcg) by mouth every morning (before breakfast) 90 tablet 3     NOVOLOG VIAL 100 UNIT/ML soln per pump 60 mL 0     simvastatin (ZOCOR) 10 MG tablet Take 1 tablet (10 mg) by mouth At Bedtime 90 tablet 3     triamcinolone (KENALOG) 0.1 % cream        Allergies   Allergen Reactions     Codeine Unknown     Loses consciousness     Recent Labs   Lab Test  05/24/18   1040  03/02/18   0802  04/25/17   1356  08/16/16   1026   03/24/15   0820   A1C   --   6.9*   --    --    --    --    LDL   --   138*   --   159*   --   104*   HDL   --   97*   --   68   --   72   TRIG   --   51   --   55   --   61   ALT  33   --    --    --    --    --    CR  0.83  0.83  0.74   --    < >  0.81   GFRESTIMATED  73  73   --    --    --    --    GFRESTBLACK  88  88  >60   --    < >  >60   POTASSIUM  3.6   --   4.0   --    < >  3.6    < > = values in this interval not displayed.      BP Readings from Last 3 Encounters:   09/07/18 122/82   05/24/18 117/62   05/24/18 144/76    Wt Readings from Last 3 Encounters: "   05/24/18 172 lb 3.2 oz (78.1 kg)   03/02/18 169 lb (76.7 kg)   10/26/17 161 lb (73 kg)                    Reviewed and updated as needed this visit by clinical staff       Reviewed and updated as needed this visit by Provider         ROS:      OBJECTIVE:     /82 (BP Location: Right arm, Patient Position: Sitting, Cuff Size: Adult Large)  Pulse 80  Breastfeeding? No  There is no height or weight on file to calculate BMI.     GENERAL: healthy, alert and no distress  EYES: Eyes grossly normal to inspection  HENT: normocaephalic  NECK: no adenopathy, no asymmetry, masses, or scars  RESP: lungs clear to auscultation - no rales, rhonchi or wheezes  CV: regular rate and rhythm, normal S1 S2, no S3 or S4, no murmur, click or rub  ABDOMEN: soft, suprapubic pressure, no tenderness, no guarding, no hepatosplenomegaly, no masses and bowel sounds normal. No CVA tenderness  NEURO: Normal strength and tone, mentation intact and speech normal  PSYCH: mentation appears normal, affect normal    Diagnostic Test Results:  Results for orders placed or performed in visit on 09/07/18 (from the past 24 hour(s))   Urinalysis w Reflex Microscopic If Positive   Result Value Ref Range    Color Urine Yellow     Appearance Urine Clear     Glucose Urine Negative NEG^Negative mg/dL    Bilirubin Urine Negative NEG^Negative    Ketones Urine Negative NEG^Negative mg/dL    Specific Gravity Urine <1.005 1.000 - 1.030    Blood Urine Negative NEG^Negative    pH Urine 6.5 5.0 - 9.0 pH    Protein Albumin Urine Negative NEG^Negative mg/dL    Urobilinogen Urine 1.0 0.2 - 1.0 EU/dL    Nitrite Urine Negative NEG^Negative    Leukocyte Esterase Urine Negative NEG^Negative    Source Midstream Urine        ASSESSMENT/PLAN:       1. UTI symptoms  Acute  - Urinalysis w Reflex Microscopic If Positive  - UA negative for UTI.        FUTURE APPOINTMENTS:       - Follow-up visit: No improvement or worsening symptoms.     Stephani Chowdhury, [facility]

## 2018-09-08 ASSESSMENT — ANXIETY QUESTIONNAIRES: GAD7 TOTAL SCORE: 0

## 2018-09-08 ASSESSMENT — PATIENT HEALTH QUESTIONNAIRE - PHQ9: SUM OF ALL RESPONSES TO PHQ QUESTIONS 1-9: 0

## 2018-09-09 DIAGNOSIS — E10.9 TYPE 1 DIABETES MELLITUS WITHOUT COMPLICATION (H): ICD-10-CM

## 2018-09-09 NOTE — TELEPHONE ENCOUNTER
Requested Prescriptions   Pending Prescriptions Disp Refills     insulin aspart (NOVOLOG VIAL) 100 UNITS/ML injection 60 mL 0    There is no refill protocol information for this order      Refill per Collaborative Practice Agreement through March 2019 as patient was seen by her PCP March 2018.   Patient is currently traveling and needs refill for pump

## 2018-10-14 ENCOUNTER — TRANSFERRED RECORDS (OUTPATIENT)
Dept: HEALTH INFORMATION MANAGEMENT | Facility: OTHER | Age: 48
End: 2018-10-14

## 2018-10-14 ENCOUNTER — OFFICE VISIT (OUTPATIENT)
Dept: FAMILY MEDICINE | Facility: OTHER | Age: 48
End: 2018-10-14
Payer: COMMERCIAL

## 2018-10-14 ENCOUNTER — HOSPITAL ENCOUNTER (OUTPATIENT)
Dept: GENERAL RADIOLOGY | Facility: OTHER | Age: 48
Discharge: HOME | End: 2018-10-14
Attending: PHYSICIAN ASSISTANT | Admitting: PHYSICIAN ASSISTANT
Payer: COMMERCIAL

## 2018-10-14 VITALS
HEART RATE: 59 BPM | RESPIRATION RATE: 14 BRPM | BODY MASS INDEX: 28.46 KG/M2 | WEIGHT: 177.1 LBS | OXYGEN SATURATION: 96 % | SYSTOLIC BLOOD PRESSURE: 132 MMHG | HEIGHT: 66 IN | DIASTOLIC BLOOD PRESSURE: 82 MMHG | TEMPERATURE: 97.1 F

## 2018-10-14 DIAGNOSIS — R05.9 COUGH: ICD-10-CM

## 2018-10-14 DIAGNOSIS — J22 LOWER RESPIRATORY TRACT INFECTION: Primary | ICD-10-CM

## 2018-10-14 PROCEDURE — 71046 X-RAY EXAM CHEST 2 VIEWS: CPT

## 2018-10-14 PROCEDURE — 99213 OFFICE O/P EST LOW 20 MIN: CPT | Performed by: PHYSICIAN ASSISTANT

## 2018-10-14 RX ORDER — AZITHROMYCIN 250 MG/1
TABLET, FILM COATED ORAL
Qty: 6 TABLET | Refills: 0 | Status: SHIPPED | OUTPATIENT
Start: 2018-10-14 | End: 2019-04-10

## 2018-10-14 RX ORDER — BENZONATATE 200 MG/1
200 CAPSULE ORAL 3 TIMES DAILY PRN
Qty: 21 CAPSULE | Refills: 0 | Status: SHIPPED | OUTPATIENT
Start: 2018-10-14 | End: 2019-04-10

## 2018-10-14 ASSESSMENT — PAIN SCALES - GENERAL: PAINLEVEL: NO PAIN (0)

## 2018-10-14 NOTE — MR AVS SNAPSHOT
After Visit Summary   10/14/2018    Floresita Hill    MRN: [id number]           Patient Information     Date Of Birth          1970        Visit Information        Provider Department      10/14/2018 2:30 PM Hilary Ovalle PA-C [facility]        Today's Diagnoses     Lower respiratory tract infection    -  1    Cough          Care Instructions    Lower respiratory tract infection  Chest XR negative for a consolidation, pending radiology confirmation  Pertussis screen pending - we will call with results  Rest, fluids  Humidified air  Start azithromycin 250 mg oral tablet, take 2 tablets today and 1 tablet day 2-5  Benzonatate 200 mg oral tablet, take 3 times/day as needed for cough  Robitussin DM every 4 hours as needed for cough  Return to clinic if symptoms persist or worsen  Seek immediate care for    Coughing up blood    Worsening weakness, drowsiness, headache, or stiff neck    Trouble breathing, wheezing, or pain with breathing            Follow-ups after your visit        Follow-up notes from your care team     Return if symptoms worsen or fail to improve.      Future tests that were ordered for you today     Open Future Orders        Priority Expected Expires Ordered    XR Chest 2 Views Routine 10/14/2018 10/14/2019 10/14/2018            Who to contact     If you have questions or need follow up information about today's clinic visit or your schedule please contact [facility] directly at [phone].  Normal or non-critical lab and imaging results will be communicated to you by MyChart, letter or phone within 4 business days after the clinic has received the results. If you do not hear from us within 7 days, please contact the clinic through MyChart or phone. If you have a critical or abnormal lab result, we will notify you by phone as soon as possible.  Submit refill requests through [employer] or call your pharmacy and they will  "forward the refill request to us. Please allow 3 business days for your refill to be completed.          Additional Information About Your Visit        [employer] Information     [employer] gives you secure access to your electronic health record. If you see a primary care provider, you can also send messages to your care team and make appointments. If you have questions, please call your primary care clinic.  If you do not have a primary care provider, please call [phone] and they will assist you.        Care EveryWhere ID     This is your Care EveryWhere ID. This could be used by other organizations to access your [facility] medical records  [id number]        Your Vitals Were     Pulse Temperature Respirations Height Last Period Pulse Oximetry    59 97.1  F (36.2  C) (Tympanic) 14 5' 6\" (1.676 m) 09/10/2018 (Approximate) 96%    BMI (Body Mass Index)                   28.58 kg/m2            Blood Pressure from Last 3 Encounters:   10/14/18 132/82   09/07/18 122/82   05/24/18 117/62    Weight from Last 3 Encounters:   10/14/18 177 lb 1.6 oz (80.3 kg)   05/24/18 172 lb 3.2 oz (78.1 kg)   03/02/18 169 lb (76.7 kg)                 Today's Medication Changes          These changes are accurate as of 10/14/18  3:56 PM.  If you have any questions, ask your nurse or doctor.               Start taking these medicines.        Dose/Directions    azithromycin 250 MG tablet   Commonly known as:  ZITHROMAX   Used for:  Lower respiratory tract infection   Started by:  Hilary Ovalle PA-C        Two tablets first day, then one tablet daily for four days.   Quantity:  6 tablet   Refills:  0       benzonatate 200 MG capsule   Commonly known as:  TESSALON   Used for:  Lower respiratory tract infection   Started by:  Hilary Ovalle PA-C        Dose:  200 mg   Take 1 capsule (200 mg) by mouth 3 times daily as needed for cough   Quantity:  21 capsule   Refills:  0            Where to get your medicines      These medications were sent to " [id number] [place], [place] - [address]  [address] [facility] [address]     Phone:  [phone]     azithromycin 250 MG tablet    benzonatate 200 MG capsule                Primary Care Provider Fax #    Physician No Ref-Primary [phone]       No address on file        Equal Access to Services     [facility]: Hadii aad ku hadasho Soomaali, waaxda luqadaha, qaybta kaalmada adeegyada, waxay idiin hayaan adeeg khayesha lazachariahtroy . So [facility] [phone].    ATENCIÓN: Si habla español, tiene a vega disposición servicios gratuitos de asistencia lingüística. Llame al [phone].    We comply with applicable federal civil rights laws and [place] laws. We do not discriminate on the basis of race, color, national origin, age, disability, sex, sexual orientation, or gender identity.            Thank you!     Thank you for choosing [facility] AND [facility]  for your care. Our goal is always to provide you with excellent care. Hearing back from our patients is one way we can continue to improve our services. Please take a few minutes to complete the written survey that you may receive in the mail after your visit with us. Thank you!             Your Updated Medication List - Protect others around you: Learn how to safely use, store and throw away your medicines at www.disposemymeds.org.          This list is accurate as of 10/14/18  3:56 PM.  Always use your most recent med list.                   Brand Name Dispense Instructions for use Diagnosis    * ACCU-CHEK JESICA PLUS test strip   Generic drug:  blood glucose monitoring      USE ONE STRIP TO CHECK GLUCOSE FIVE TIMES DAILY.        * blood glucose monitoring test strip    no brand specified    450 strip    Use to test blood sugars 5 times daily with pump in Vitro 90 days    Type 1 diabetes mellitus without complication (H)       aspirin 81 MG EC tablet      Take 81 mg by mouth daily with food        azithromycin 250 MG tablet  "   ZITHROMAX    6 tablet    Two tablets first day, then one tablet daily for four days.    Lower respiratory tract infection       B-D INSULIN SYRINGE ULTRAFINE 29G X 1/2\" 0.5 ML   Generic drug:  insulin syringe-needle U-100      For administering insulin at home.        benzonatate 200 MG capsule    TESSALON    21 capsule    Take 1 capsule (200 mg) by mouth 3 times daily as needed for cough    Lower respiratory tract infection       FLUoxetine 20 MG capsule    PROzac    90 capsule    Take 1 capsule (20 mg) by mouth every morning    Dysthymic disorder       insulin aspart 100 UNITS/ML injection    NovoLOG VIAL    60 mL    Per pump Medtronic subcutaneous administration    Type 1 diabetes mellitus without complication (H)       insulin glargine 100 UNIT/ML injection    LANTUS    3 vial    Inject 20 Units Subcutaneous At Bedtime Before bedtime    Type 1 diabetes mellitus without complication (H)       levothyroxine 100 MCG tablet    SYNTHROID/LEVOTHROID    90 tablet    Take 1 tablet (100 mcg) by mouth every morning (before breakfast)    Hashimoto's thyroiditis       simvastatin 10 MG tablet    ZOCOR    90 tablet    Take 1 tablet (10 mg) by mouth At Bedtime    Type 1 diabetes mellitus without complication (H)       [employer] BLOOD GLUCOSE MONITOR w/Device Kit      Dispense glucose meter, test strips and lancets covered by the patient insurance. Test 5 times per day. 3 month supply with 4 refills.        triamcinolone 0.1 % cream    KENALOG          * Notice:  This list has 2 medication(s) that are the same as other medications prescribed for you. Read the directions carefully, and ask your doctor or other care provider to review them with you.      "

## 2018-10-14 NOTE — NURSING NOTE
"Patient presents to clinic for cough x 10 days.  Niyah Sotomayor LPN....................  10/14/2018   2:39 PM    Chief Complaint   Patient presents with     Cough       Initial There were no vitals taken for this visit. Estimated body mass index is 28.09 kg/(m^2) as calculated from the following:    Height as of 3/2/18: 5' 5.65\" (1.668 m).    Weight as of 5/24/18: 172 lb 3.2 oz (78.1 kg).  Medication Reconciliation: complete    Niyah Sotomayor LPN    "

## 2018-10-14 NOTE — PATIENT INSTRUCTIONS
Lower respiratory tract infection  Chest XR negative for a consolidation, pending radiology confirmation  Pertussis screen pending - we will call with results  Rest, fluids  Humidified air  Start azithromycin 250 mg oral tablet, take 2 tablets today and 1 tablet day 2-5  Benzonatate 200 mg oral tablet, take 3 times/day as needed for cough  Robitussin DM every 4 hours as needed for cough  Return to clinic if symptoms persist or worsen  Seek immediate care for    Coughing up blood    Worsening weakness, drowsiness, headache, or stiff neck    Trouble breathing, wheezing, or pain with breathing

## 2018-10-14 NOTE — PROGRESS NOTES
"SUBJECTIVE:  Floresita Hill is a 48 year old female who presents to the clinic today with a dry cough for 10 days. Course is worsening. Associated symptoms: cough with emesis, coughing jags, worse at night. Some episodes at night cause incontinence. Headaches with cough, chest tightness, shortness of breath after coughing.     No fever.   Exposures not known  Treatments: Robitussin, Mucinex    No history of asthma, allergies, tobacco use.     Past Medical History:   Diagnosis Date     Dysthymic disorder     10/11/2011     Encounter for insertion of intrauterine contraceptive device     1/17/13,placed by Dr. Carrillo.     Iron deficiency anemia     11/16/2012     Iron deficiency anemia     11/16/2012     Major depressive disorder, single episode     No Comments Provided     Mixed hyperlipidemia     11/16/2012     Type 1 diabetes mellitus without complications (H)     Dx at 10 years old,Uses Medtronic Insulin pump.      Social History   Substance Use Topics     Smoking status: Never Smoker     Smokeless tobacco: Never Used     Alcohol use 1.2 oz/week      Comment: Alcoholic Drinks/day: Glass of wine daily     ROS  General: fatigue, no fever  HENT: occasional runny nose  Respiratory: cough, chest tightness, shortness of breath    OBJECTIVE:  Exam:  Vitals:    10/14/18 1441   BP: 132/82   BP Location: Left arm   Patient Position: Sitting   Cuff Size: Adult Large   Pulse: 59   Resp: 14   Temp: 97.1  F (36.2  C)   TempSrc: Tympanic   SpO2: 96%   Weight: 177 lb 1.6 oz (80.3 kg)   Height: 5' 6\" (1.676 m)       General: healthy, alert and no distress, appears hydarated, vital signs stable   Head: NORMAL - atraumatic, nontender.  Ears: normal canals, TMs bilaterally,  Eyes: NORMAL - no injection no discharge, no periorbital swelling.  Nose: ABNORMAL - swollen nasal turbinates.  Neck: supple, non-tender, free range of motion, no adenopathy  Throat: ABNORMAL - mild erythema.  Resp: ABNORMAL - bilateral rales at " bases  Cardiac: NORMAL - regular rate and rhythm without murmur.    Chest XR negative for consolidation    ASSESSMENT:    (J22) Lower respiratory tract infection  (primary encounter diagnosis)    Plan: azithromycin (ZITHROMAX) 250 MG tablet,         benzonatate (TESSALON) 200 MG capsule      (R05) Cough  Plan: XR Chest 2 Views    Lower respiratory tract infection  Chest XR negative for a consolidation, pending radiology confirmation  Pertussis screen pending - we will call with results  Rest, fluids  Humidified air  Start azithromycin 250 mg oral tablet, take 2 tablets today and 1 tablet day 2-5  Benzonatate 200 mg oral tablet, take 3 times/day as needed for cough  Robitussin DM every 4 hours as needed for cough  Return to clinic if symptoms persist or worsen  Patient received verbal and written instruction including review of warning signs    Hilary Ovalle PA-C on 10/14/2018 at 8:02 PM

## 2018-10-17 ENCOUNTER — MYC REFILL (OUTPATIENT)
Dept: FAMILY MEDICINE | Facility: OTHER | Age: 48
End: 2018-10-17

## 2018-10-17 ENCOUNTER — TELEPHONE (OUTPATIENT)
Dept: FAMILY MEDICINE | Facility: OTHER | Age: 48
End: 2018-10-17

## 2018-10-17 DIAGNOSIS — F34.1 DYSTHYMIC DISORDER: ICD-10-CM

## 2018-10-17 NOTE — TELEPHONE ENCOUNTER
Called and spoke with patient after proper verification. Patient states she is looking for the results of her whooping cough from her visit. Also is wondering if she can be prescribed something for her cough. Understands she can't cough syrup with codeine, but has had tessalon pearls in the past. Please advise.   Gely Burton LPN............. October 17, 2018 2:58 PM

## 2018-10-18 NOTE — TELEPHONE ENCOUNTER
Message from MyChart:  Original authorizing provider: MD Floresita SINCLAIR would like a refill of the following medications:  FLUoxetine (PROZAC) 20 MG capsule [JOSE R DENSON MD]    Preferred pharmacy: [facility], - [place], [place] - [address]    Comment:  Dr Denson is no longer practicing at [facility], and patient has been seen recently by Stephani Chowdhury NP and Kathy Winkler NP.  Would one of you please do this refill?  Alycia Birmingham CMA([facility])..................10/18/2018   4:18 PM

## 2018-10-19 NOTE — TELEPHONE ENCOUNTER
Please notify patient that her test was resulted yesterday and was negative. Continue with treatments as discussed at your [facility] visit. Follow up with PCP is your symptoms persist or worsen. Thank you, BARRY Christie

## 2018-10-22 DIAGNOSIS — R05.9 COUGH: Primary | ICD-10-CM

## 2018-10-22 RX ORDER — BENZONATATE 200 MG/1
200 CAPSULE ORAL 3 TIMES DAILY PRN
Qty: 21 CAPSULE | Refills: 0 | Status: SHIPPED | OUTPATIENT
Start: 2018-10-22 | End: 2019-04-10

## 2018-10-22 NOTE — TELEPHONE ENCOUNTER
Patient left a message on our answering machine. We can reach her at ext: [phone] or [phone]. Its ok to leave a message.    Cough has returned. She is wondering if she is able to get a Rx for tessalon perls?  Amada Sarmiento CMA..............10/22/2018........10:03 AM

## 2018-10-23 NOTE — TELEPHONE ENCOUNTER
Sent a prescription of benzonatate 200 mg oral capsule 3 times daily as needed for cough. (Diana Kathleen). Follow up with PCP if symptoms persist or worsen.

## 2019-04-10 ENCOUNTER — OFFICE VISIT (OUTPATIENT)
Dept: INTERNAL MEDICINE | Facility: OTHER | Age: 49
End: 2019-04-10
Attending: INTERNAL MEDICINE
Payer: COMMERCIAL

## 2019-04-10 VITALS
SYSTOLIC BLOOD PRESSURE: 116 MMHG | RESPIRATION RATE: 16 BRPM | TEMPERATURE: 96.8 F | OXYGEN SATURATION: 98 % | HEART RATE: 68 BPM | DIASTOLIC BLOOD PRESSURE: 70 MMHG | WEIGHT: 175.2 LBS | BODY MASS INDEX: 28.28 KG/M2

## 2019-04-10 DIAGNOSIS — E06.3 HYPOTHYROIDISM DUE TO HASHIMOTO'S THYROIDITIS: ICD-10-CM

## 2019-04-10 DIAGNOSIS — Z12.31 ENCOUNTER FOR SCREENING MAMMOGRAM FOR BREAST CANCER: ICD-10-CM

## 2019-04-10 DIAGNOSIS — Z86.2 HISTORY OF ANEMIA: ICD-10-CM

## 2019-04-10 DIAGNOSIS — Z12.11 SCREENING FOR COLON CANCER: ICD-10-CM

## 2019-04-10 DIAGNOSIS — E10.9 TYPE 1 DIABETES MELLITUS WITHOUT COMPLICATION (H): Primary | ICD-10-CM

## 2019-04-10 DIAGNOSIS — Z23 NEED FOR SHINGLES VACCINE: ICD-10-CM

## 2019-04-10 DIAGNOSIS — F34.1 DYSTHYMIC DISORDER: ICD-10-CM

## 2019-04-10 LAB
ALBUMIN SERPL-MCNC: 4.1 G/DL (ref 3.5–5.7)
ALP SERPL-CCNC: 50 U/L (ref 34–104)
ALT SERPL W P-5'-P-CCNC: 15 U/L (ref 7–52)
ANION GAP SERPL CALCULATED.3IONS-SCNC: 5 MMOL/L (ref 3–14)
AST SERPL W P-5'-P-CCNC: 16 U/L (ref 13–39)
BILIRUB SERPL-MCNC: 0.4 MG/DL (ref 0.3–1)
BUN SERPL-MCNC: 8 MG/DL (ref 7–25)
CALCIUM SERPL-MCNC: 9.2 MG/DL (ref 8.6–10.3)
CHLORIDE SERPL-SCNC: 100 MMOL/L (ref 98–107)
CHOLEST SERPL-MCNC: 239 MG/DL
CO2 SERPL-SCNC: 30 MMOL/L (ref 21–31)
CREAT SERPL-MCNC: 0.75 MG/DL (ref 0.6–1.2)
CREAT UR-MCNC: 153 MG/DL
ERYTHROCYTE [DISTWIDTH] IN BLOOD BY AUTOMATED COUNT: 12.2 % (ref 10–15)
FERRITIN SERPL-MCNC: 30 NG/ML (ref 23.9–336.2)
GFR SERPL CREATININE-BSD FRML MDRD: 82 ML/MIN/{1.73_M2}
GLUCOSE SERPL-MCNC: 152 MG/DL (ref 70–105)
HBA1C MFR BLD: 7.8 % (ref 4–6)
HCT VFR BLD AUTO: 40.7 % (ref 35–47)
HDLC SERPL-MCNC: 78 MG/DL (ref 23–92)
HGB BLD-MCNC: 13.2 G/DL (ref 11.7–15.7)
LDLC SERPL CALC-MCNC: 149 MG/DL
MCH RBC QN AUTO: 30.2 PG (ref 26.5–33)
MCHC RBC AUTO-ENTMCNC: 32.4 G/DL (ref 31.5–36.5)
MCV RBC AUTO: 93 FL (ref 78–100)
MICROALBUMIN UR-MCNC: 7 MG/L
MICROALBUMIN/CREAT UR: 4.76 MG/G CR (ref 0–25)
NONHDLC SERPL-MCNC: 161 MG/DL
PLATELET # BLD AUTO: 326 10E9/L (ref 150–450)
POTASSIUM SERPL-SCNC: 3.9 MMOL/L (ref 3.5–5.1)
PROT SERPL-MCNC: 7 G/DL (ref 6.4–8.9)
RBC # BLD AUTO: 4.37 10E12/L (ref 3.8–5.2)
SODIUM SERPL-SCNC: 135 MMOL/L (ref 134–144)
TRIGL SERPL-MCNC: 61 MG/DL
WBC # BLD AUTO: 5.4 10E9/L (ref 4–11)

## 2019-04-10 PROCEDURE — 85027 COMPLETE CBC AUTOMATED: CPT | Performed by: INTERNAL MEDICINE

## 2019-04-10 PROCEDURE — 82043 UR ALBUMIN QUANTITATIVE: CPT | Performed by: INTERNAL MEDICINE

## 2019-04-10 PROCEDURE — 36415 COLL VENOUS BLD VENIPUNCTURE: CPT | Performed by: INTERNAL MEDICINE

## 2019-04-10 PROCEDURE — 82728 ASSAY OF FERRITIN: CPT | Performed by: INTERNAL MEDICINE

## 2019-04-10 PROCEDURE — 80053 COMPREHEN METABOLIC PANEL: CPT | Performed by: INTERNAL MEDICINE

## 2019-04-10 PROCEDURE — 99396 PREV VISIT EST AGE 40-64: CPT | Performed by: INTERNAL MEDICINE

## 2019-04-10 PROCEDURE — 80061 LIPID PANEL: CPT | Performed by: INTERNAL MEDICINE

## 2019-04-10 PROCEDURE — 83036 HEMOGLOBIN GLYCOSYLATED A1C: CPT | Performed by: INTERNAL MEDICINE

## 2019-04-10 RX ORDER — SIMVASTATIN 10 MG
10 TABLET ORAL AT BEDTIME
Qty: 90 TABLET | Refills: 3 | Status: CANCELLED | OUTPATIENT
Start: 2019-04-10

## 2019-04-10 RX ORDER — LEVOTHYROXINE SODIUM 100 UG/1
100 TABLET ORAL
Qty: 90 TABLET | Refills: 4 | Status: SHIPPED | OUTPATIENT
Start: 2019-04-10 | End: 2020-05-05

## 2019-04-10 RX ORDER — FLUOXETINE 10 MG/1
30 CAPSULE ORAL EVERY MORNING
Qty: 180 CAPSULE | Refills: 3 | Status: SHIPPED | OUTPATIENT
Start: 2019-04-10 | End: 2020-05-07

## 2019-04-10 ASSESSMENT — PATIENT HEALTH QUESTIONNAIRE - PHQ9: SUM OF ALL RESPONSES TO PHQ QUESTIONS 1-9: 0

## 2019-04-10 ASSESSMENT — PAIN SCALES - GENERAL: PAINLEVEL: NO PAIN (0)

## 2019-04-10 NOTE — NURSING NOTE
Patient presents to the clinic for diabetic follow-up.     Previous A1C is at goal of <8    Lab Results   Component Value Date    A1C 6.9 03/02/2018       Patient has Type 1 Diabetes  Diabetes medications: Injectable Medications: Novolog - Dose: Continous Pump , Time: Continous   Patient is on a daily aspirin  Patient is on a Statin.  Blood glucose tests per day 4  Urine microalbumin:creatine: 04/26/2017  Foot exam Needs to be comleted  Eye exam: Needs to be completed    Blood pressure today of 116/70 is at the goal of <139/89 for diabetics.    Tobacco User:   History   Smoking Status     Never Smoker   Smokeless Tobacco     Never Used       Other concerns today: None noted.     Medication Reconciliation: sarah Burton LPN on 4/10/2019 at 8:51 AMPatient presents to the clinic for annual visit, medication discussion and review.     Medication Reconciliation: sarah Burton LPN............. April 10, 2019 8:45 AM

## 2019-04-10 NOTE — PROGRESS NOTES
Chief Complaint   Patient presents with     Annual Visit         HPI: Ms. Casey Hill is a 48 year old female who presents today for yearly physical.  She overall is feeling good.      She has a history of type 1 diabetes.  This has been present since she was a child.  Traditionally her blood sugars have been fairly well controlled.  She is on a aspirin.  She has been on a statin in the past however more recently has not been taking this.  Her overall risk for cardiovascular disease is 1.5%.  She is due for A1c, microalbumin, eye exam and foot exam.    She has dysthymic disorder.  She does feel that things have been slightly more stressful recently.  When she has gone off of her Prozac in the past she has developed some irritability and feels more angry.  She does feel that she does best at a dose of 20 mg or higher.  She does feel that perhaps she would benefit from a slightly higher dose.  She also has a history of anemia and has not had this checked in a few years.    She is on levothyroxine for her hypothyroidism.  She denies any signs of high or low thyroid however has been having some issues with engorged breasts.  She has not noted any discharge.  She has assumed that this is related to perimenopausal hormones as her colleagues and friends who are in the same position as she is age wise are having some of the same issues.    She is perimenopausal.  She is due for colon cancer screening.  She is due for screening mammogram.  She is due for shingles vaccine at this time.  She is a caught up on her vaccines otherwise.    History is discussed and updated on 4/10/2019 with patient.  It is current to the best of my knowledge as below.    Past Medical History:   Diagnosis Date     Hashimoto's thyroiditis 3/29/2015     Iron deficiency anemia 11/16/2012     Major depressive disorder, single episode      Type 1 diabetes mellitus without complications (H)     Dx at 10 years old,Uses Medtronic Insulin pump.       "  Past Surgical History:   Procedure Laterality Date     ARTHROTOMY TEMPOROMANDIBULAR JOINT  1985     DILATION AND CURETTAGE      x3; benign         Current Outpatient Medications   Medication Sig Dispense Refill     aspirin EC 81 MG EC tablet Take 81 mg by mouth daily with food       blood glucose (NO BRAND SPECIFIED) test strip Use to test blood sugars 5 times daily with pump in Vitro 90 days 450 strip 11     Blood Glucose Monitoring Suppl (MPV BLOOD GLUCOSE MONITOR) W/DEVICE KIT Dispense glucose meter, test strips and lancets covered by the patient insurance. Test 5 times per day. 3 month supply with 4 refills.       FLUoxetine (PROZAC) 10 MG capsule Take 3 capsules (30 mg) by mouth every morning 180 capsule 3     insulin aspart (NOVOLOG VIAL) 100 UNITS/ML vial Per pump kabuku subcutaneous administration 60 mL 4     insulin glargine (LANTUS) 100 UNIT/ML injection Inject 20 Units Subcutaneous At Bedtime Before bedtime 3 vial 3     insulin syringe-needle U-100 (B-D INSULIN SYRINGE ULTRAFINE) 29G X 1/2\" 0.5 ML For administering insulin at home.       levothyroxine (SYNTHROID/LEVOTHROID) 100 MCG tablet Take 1 tablet (100 mcg) by mouth every morning (before breakfast) 90 tablet 4     triamcinolone (KENALOG) 0.1 % cream        zoster vaccine recombinant adjuvanted (SHINGRIX) injection Inject 0.5 mLs into the muscle once for 1 dose Repeat in 6 months 0.5 mL 0       Allergies   Allergen Reactions     Codeine Unknown     Loses consciousness     Hmg-Coa-R Inhibitors Muscle Pain (Myalgia)     Mild achiness, may try again in future        Family History   Problem Relation Age of Onset     Kidney Disease Mother         s/p transplant list     Myasthenia gravis Mother      Hypothyroidism Sister      Bipolar Disorder Sister      Migraines Sister      Breast Cancer Maternal Aunt         Cancer-breast     Hearing Loss Daughter         bilateral     Other - See Comments Daughter         asd spectrum     Albinism Daughter  " "    Anxiety Disorder Daughter      No Known Problems Daughter        Family Status   Relation Name Status     Fa   at age 53         in motorcycle accident     Mo  Alive     Sis Fidel Alive     MAunt  (Not Specified)     Araceli Fernandez Alive     Araceli Espinoza Alive        Social History     Tobacco Use     Smoking status: Never Smoker     Smokeless tobacco: Never Used   Substance Use Topics     Alcohol use: Yes     Alcohol/week: 1.2 oz     Comment: Alcoholic Drinks/day: 3 days weekly       Social History     Social History Narrative    , moved here from [place].   Sense of humor intact.   works at [employer] as  at [employer].  [place] - .  2 anthony, Rebecca (), Olga ()    Works in HR at [employer].                ROS  GEN: -fevers/-chills/-night sweats/-wt change  NEURO: -headaches/-vision changes  EARS: -hearing changes  NOSE: -drainage/-congestion  MOUTH/THROAT: - sore throat/-dysphagia/-sores  LUNGS: -sob/-cough  CARDIOVASCULAR: -cp/-palpitations  GI: -pain/-diarrhea/-constipation/-bloody stools  :-dysuria/-hematuria/-sores/-vaginal discharge or bleeding  ENDOCRINE: -skin or hair changes  HEMATOLOGIC/LYMPHATIC: -swollen nodes  SKIN: -rashes/-lesions/+breast or nipple changes  MSK/RHEUM: + Occasional joint pain/-swelling  NEURO: -weakness/+parasthesias -with numbness down her left leg secondary to history of bulging disc  PSYCH: -anhedonia/+depression/+anxiety       EXAM:   /70 (BP Location: Right arm, Patient Position: Sitting, Cuff Size: Adult Regular)   Pulse 68   Temp 96.8  F (36  C) (Tympanic)   Resp 16   Wt 79.5 kg (175 lb 3.2 oz)   SpO2 98%   Breastfeeding? No   BMI 28.28 kg/m    Estimated body mass index is 28.28 kg/m  as calculated from the following:    Height as of 10/14/18: 1.676 m (5' 6\").    Weight as of this encounter: 79.5 kg (175 lb 3.2 oz).      GEN: Vitals reviewed. Healthy appearing. Patient is in no acute distress. Cooperative with exam.  HEENT: " Normocephalic atraumatic.  Normal external eye, conjunctiva, lids, cornea with no scleral icterus or conjunctival erythema. Pupils equally round. Oropharynx with no erythema or exudates. Dentition adequate.  EACs clear bilat, TM gray with normal landmarks.  NECK: Supple; no thyromegaly or masses noted.  No cervical or supraclavicular lymphadenopathy.  CV: Heart regular in rate and rhythm with no murmur.    LUNGS: Lungs clear to auscultation bilaterally.  Chest rise equal bilaterally.  No accessory muscle use.  ABD:  Soft, nontender, and nondistended.  No rebound. Bowel sounds positive.  SKIN: Warm and dry to touch.  No rash on face, arms and legs.  EXT: No clubbing or cyanosis.  No peripheral edema.  NEURO: Alert and oriented to person, place, and time.  Cranial nerves II-XII grossly intact with no focal or lateralizing deficits.  Muscle tone normal.  Gait normal. No tremor.  MSK: ROM of upper and lower ext symmetric and full.  PSYCH: Mood is good.  Affect appropriate. Speech fluent. Answers questions appropriately and thought process normal.    LABS: 4/10/2019 - Personally ordered/reviewed  Results for orders placed or performed in visit on 04/10/19   CBC W PLT No Diff   Result Value Ref Range    WBC 5.4 4.0 - 11.0 10e9/L    RBC Count 4.37 3.8 - 5.2 10e12/L    Hemoglobin 13.2 11.7 - 15.7 g/dL    Hematocrit 40.7 35.0 - 47.0 %    MCV 93 78 - 100 fl    MCH 30.2 26.5 - 33.0 pg    MCHC 32.4 31.5 - 36.5 g/dL    RDW 12.2 10.0 - 15.0 %    Platelet Count 326 150 - 450 10e9/L   Comprehensive Metabolic Panel   Result Value Ref Range    Sodium 135 134 - 144 mmol/L    Potassium 3.9 3.5 - 5.1 mmol/L    Chloride 100 98 - 107 mmol/L    Carbon Dioxide 30 21 - 31 mmol/L    Anion Gap 5 3 - 14 mmol/L    Glucose 152 (H) 70 - 105 mg/dL    Urea Nitrogen 8 7 - 25 mg/dL    Creatinine 0.75 0.60 - 1.20 mg/dL    GFR Estimate 82 >60 mL/min/[1.73_m2]    GFR Estimate If Black >90 >60 mL/min/[1.73_m2]    Calcium 9.2 8.6 - 10.3 mg/dL    Bilirubin  Total 0.4 0.3 - 1.0 mg/dL    Albumin 4.1 3.5 - 5.7 g/dL    Protein Total 7.0 6.4 - 8.9 g/dL    Alkaline Phosphatase 50 34 - 104 U/L    ALT 15 7 - 52 U/L    AST 16 13 - 39 U/L   Ferritin   Result Value Ref Range    Ferritin 30 23.9 - 336.2 ng/mL   Hemoglobin A1c   Result Value Ref Range    Hemoglobin A1C 7.8 (H) 4.0 - 6.0 %   Lipid Panel   Result Value Ref Range    Cholesterol 239 (H) <200 mg/dL    Triglycerides 61 <150 mg/dL    HDL Cholesterol 78 23 - 92 mg/dL    LDL Cholesterol Calculated 149 (H) <100 mg/dL    Non HDL Cholesterol 161 (H) <130 mg/dL             ASSESSMENT AND PLAN:    1. Type 1 diabetes mellitus without complication (H)  -A1c is slightly increased today.  She does need to work on reducing her weight, increasing activity and monitoring her sugars.  She is to call if she needs adjustment of her insulin pump.  - blood glucose (NO BRAND SPECIFIED) test strip; Use to test blood sugars 5 times daily with pump in Vitro 90 days  Dispense: 450 strip; Refill: 11  - insulin aspart (NOVOLOG VIAL) 100 UNITS/ML vial; Per pump Medtronic subcutaneous administration  Dispense: 60 mL; Refill: 4  - Comprehensive Metabolic Panel  - Hemoglobin A1c  - Lipid Panel  - Albumin Random Urine Quantitative with Creat Ratio    2. Dysthymic disorder  -At this time given her slightly worse mood we will increase her Prozac to 30 mg daily.  She is to call if she has any side effects with this.  She can titrate down if desired later in the year.  - FLUoxetine (PROZAC) 10 MG capsule; Take 3 capsules (30 mg) by mouth every morning  Dispense: 180 capsule; Refill: 3    3. Hashimoto's thyroiditis  - TSH done and pending  - change with levothyroxine as appropriate  - patient was educated on importance of thyroid hormone and symptoms of hypothyroidism  - to call if any concerns   - levothyroxine (SYNTHROID/LEVOTHROID) 100 MCG tablet; Take 1 tablet (100 mcg) by mouth every morning (before breakfast)  Dispense: 90 tablet; Refill: 4  - TSH  Reflex GH; Future    4. History of anemia  Labs normal  - CBC W PLT No Diff  - Ferritin    5. Screening for colon cancer  - GASTROENTEROLOGY ADULT REF PROCEDURE ONLY    6. Need for shingles vaccine  - Rx given  - zoster vaccine recombinant adjuvanted (SHINGRIX) injection; Inject 0.5 mLs into the muscle once for 1 dose Repeat in 6 months  Dispense: 0.5 mL; Refill: 0    7. Encounter for screening mammogram for breast cancer  - MA Screen Bilateral w/Garrett; Future  -If she continues to have issues with fullness of the breast we will consider checking prolactin or additional labs.     Return in about 6 months (around 10/10/2019) for Recheck diabetes.      BETY CROWDER, DO   4/10/2019 4:09 PM    This document was prepared using voice generated softwear. While every attempt was made for accuracy, spelling and grammatical errors may exist.    ADDENDUM:  4/11/2019   5:06 PM    Results reviewed personally and TSH is elevated with normal T4.  Plan for recheck in approximately 4 weeks.  Patient is informed to take her medication separate from food and other medications.  Continue otherwise with plan as above.    Bety Crowder

## 2019-04-11 ENCOUNTER — HOSPITAL ENCOUNTER (OUTPATIENT)
Dept: MAMMOGRAPHY | Facility: OTHER | Age: 49
Discharge: HOME | End: 2019-04-11
Attending: INTERNAL MEDICINE | Admitting: INTERNAL MEDICINE
Payer: COMMERCIAL

## 2019-04-11 ENCOUNTER — HOSPITAL ENCOUNTER (OUTPATIENT)
Dept: MAMMOGRAPHY | Facility: OTHER | Age: 49
End: 2019-04-11
Attending: INTERNAL MEDICINE
Payer: COMMERCIAL

## 2019-04-11 DIAGNOSIS — Z12.31 ENCOUNTER FOR SCREENING MAMMOGRAM FOR BREAST CANCER: ICD-10-CM

## 2019-04-11 DIAGNOSIS — R92.8 ABNORMAL MAMMOGRAM: ICD-10-CM

## 2019-04-11 PROCEDURE — G0279 TOMOSYNTHESIS, MAMMO: HCPCS

## 2019-04-11 PROCEDURE — 77063 BREAST TOMOSYNTHESIS BI: CPT

## 2019-04-12 ENCOUNTER — HOSPITAL ENCOUNTER (OUTPATIENT)
Dept: MAMMOGRAPHY | Facility: OTHER | Age: 49
Discharge: HOME | End: 2019-04-12
Attending: INTERNAL MEDICINE | Admitting: INTERNAL MEDICINE
Payer: COMMERCIAL

## 2019-04-12 ENCOUNTER — HOSPITAL ENCOUNTER (OUTPATIENT)
Dept: MAMMOGRAPHY | Facility: OTHER | Age: 49
End: 2019-04-12
Attending: INTERNAL MEDICINE
Payer: COMMERCIAL

## 2019-04-12 VITALS
SYSTOLIC BLOOD PRESSURE: 118 MMHG | RESPIRATION RATE: 14 BRPM | OXYGEN SATURATION: 97 % | HEART RATE: 80 BPM | DIASTOLIC BLOOD PRESSURE: 82 MMHG

## 2019-04-12 DIAGNOSIS — R92.1 BREAST CALCIFICATION, LEFT: ICD-10-CM

## 2019-04-12 DIAGNOSIS — Z12.11 SPECIAL SCREENING FOR MALIGNANT NEOPLASMS, COLON: Primary | ICD-10-CM

## 2019-04-12 PROCEDURE — 88342 IMHCHEM/IMCYTCHM 1ST ANTB: CPT

## 2019-04-12 PROCEDURE — 19081 BX BREAST 1ST LESION STRTCTC: CPT | Mod: LT

## 2019-04-12 PROCEDURE — 88341 IMHCHEM/IMCYTCHM EA ADD ANTB: CPT

## 2019-04-12 PROCEDURE — 40000986 MA POST PROCEDURE LEFT

## 2019-04-12 PROCEDURE — 25000125 ZZHC RX 250: Performed by: RADIOLOGY

## 2019-04-12 PROCEDURE — 88305 TISSUE EXAM BY PATHOLOGIST: CPT

## 2019-04-12 RX ORDER — LIDOCAINE HYDROCHLORIDE 10 MG/ML
20 INJECTION, SOLUTION EPIDURAL; INFILTRATION; INTRACAUDAL; PERINEURAL ONCE
Status: COMPLETED | OUTPATIENT
Start: 2019-04-12 | End: 2019-04-12

## 2019-04-12 RX ORDER — LIDOCAINE HYDROCHLORIDE AND EPINEPHRINE 10; 10 MG/ML; UG/ML
20 INJECTION, SOLUTION INFILTRATION; PERINEURAL ONCE
Status: COMPLETED | OUTPATIENT
Start: 2019-04-12 | End: 2019-04-12

## 2019-04-12 RX ADMIN — LIDOCAINE HYDROCHLORIDE,EPINEPHRINE BITARTRATE 17 ML: 10; .01 INJECTION, SOLUTION INFILTRATION; PERINEURAL at 09:22

## 2019-04-12 RX ADMIN — LIDOCAINE HYDROCHLORIDE 2 ML: 10 INJECTION, SOLUTION EPIDURAL; INFILTRATION; INTRACAUDAL; PERINEURAL at 09:21

## 2019-04-12 NOTE — DISCHARGE INSTRUCTIONS
"NEEDLE BIOPSY BREAST    Activity: Rest the remainder of the day. You may resume normal activity after the next day. Avoid any vigorous/strenuous physical activity for 24 hours.    Comfort: If you have discomfort or tenderness at the site you may take your usual or recommended pain medication. Do not take aspirin the day of the procedure or for 48 hours following the biopsy.    Diet: You may resume your usual diet.    Care of site: Leave ice pack in place for 4 hours, or until it is no longer cold. The ice pack is reusable and may be refrozen.  Keep your bra and the dressing on for 24 hours. Then you may remove the bandage and shower. If there are steri-strips you may remove them in 3 to 5 days.  You may have some discomfort and a small amount of bruising where the biopsy was performed. This is normal. For several days or even a couple of weeks, you may have tenderness or \"twinges\" and a tiny bump where the needle went into the skin. This can be bothersome, but is not abnormal. You can use warm moist washcloths, as this may help. Do Not Use A Heating Pad.    RETURN TO THE EMERGENCY ROOM FOR:   Shortness of breath   Rapid heart rate   If pain becomes worse    Call Your Doctor For:    A fever over 101 degrees   Increased redness, increased swelling, and/or persistent drainage/discomfort  around the site    Other: At the end of your breast biopsy, a tiny titanium clip will be inserted through the biopsy needle and placed at the biopsy site within your breast. The marker provides a landmark of the biopsy for further mammograms or surgical procedures. This marker is MRI compatible and poses no known health risks.    You will be receiving a letter in the mail from [facility] Mammography Department with your biopsy results.  In 6 months a mammogram will be needed to establish a new baseline and to recheck the area where the biopsy occurred. Our radiology department will call you to schedule an appointment.    For " "questions, problems or concerns, contact the Radiology Department at [phone].    NEEDLE BIOPSY BREAST    Activity: Rest the remainder of the day. You may resume normal activity after the next day. Avoid any vigorous/strenuous physical activity for 24 hours.    Comfort: If you have discomfort or tenderness at the site you may take your usual or recommended pain medication. Do not take aspirin the day of the procedure or for 48 hours following the biopsy.    Diet: You may resume your usual diet.    Care of site: Leave ice pack in place for 4 hours, or until it is no longer cold. The ice pack is reusable and may be refrozen.  Keep your bra and the dressing on for 24 hours. Then you may remove the bandage and shower. If there are steri-strips you may remove them in 3 to 5 days.  You may have some discomfort and a small amount of bruising where the biopsy was performed. This is normal. For several days or even a couple of weeks, you may have tenderness or \"twinges\" and a tiny bump where the needle went into the skin. This can be bothersome, but is not abnormal. You can use warm moist washcloths, as this may help. Do Not Use A Heating Pad.    RETURN TO THE EMERGENCY ROOM FOR:   Shortness of breath   Rapid heart rate   If pain becomes worse    Call Your Doctor For:    A fever over 101 degrees   Increased redness, increased swelling, and/or persistent drainage/discomfort  around the site    Other: At the end of your breast biopsy, a tiny titanium clip will be inserted through the biopsy needle and placed at the biopsy site within your breast. The marker provides a landmark of the biopsy for further mammograms or surgical procedures. This marker is MRI compatible and poses no known health risks.    You will be receiving a letter in the mail from [facility] Mammography Department with your biopsy results.  In 6 months a mammogram will be needed to establish a new baseline and to recheck the area where the biopsy occurred. " Our radiology department will call you to schedule an appointment.    For questions, problems or concerns, contact the Radiology Department at [phone].

## 2019-04-12 NOTE — PROGRESS NOTES
Patient here for stereotactic guided biopsy of left breast.  Procedure reviewed with patient by writer and radiologist, questions answered.  Time out performed prior to biopsy.  Biopsy completed by radiologist, clip placed.  Pressure held to biopsy site for 10 minutes.  Dressing consisting of steristrips, 2x2 guaze, and tegaderm applied.   Post clip mammogram completed.  Discharge instructions reviewed with patient, patient verbalizes understanding of instructions.  Discharged to home in stable condition with no evidence of bleeding from biopsy site.   Tia Becker RN.

## 2019-04-12 NOTE — TELEPHONE ENCOUNTER
Screening Questions for the Scheduling of Screening Colonoscopies   (If Colonoscopy is diagnostic, Provider should review the chart before scheduling.)  Are you younger than 50 or older than 80?  NO   Do you take aspirin or fish oil?  NO  (if yes, tell patient to stop 1 week prior to Colonoscopy)  Do you take warfarin (Coumadin), clopidogrel (Plavix), apixaban (Eliquis), dabigatram (Pradaxa), rivaroxaban (Xarelto) or any blood thinner? NO   Do you use oxygen at home?  NO   Do you have kidney disease? NO   Are you on dialysis? NO  Have you had a stroke or heart attack in the last year? NO   Have you had a stent in your heart or any blood vessel in the last year? NO   Have you had a transplant of any organ? NO   Have you had a colonoscopy or upper endoscopy (EGD) before? NO          When?    Date of scheduled Colonoscopy. 05/24/2019  Provider [facility]

## 2019-04-16 ENCOUNTER — OFFICE VISIT (OUTPATIENT)
Dept: SURGERY | Facility: OTHER | Age: 49
End: 2019-04-16
Attending: SURGERY
Payer: COMMERCIAL

## 2019-04-16 VITALS
DIASTOLIC BLOOD PRESSURE: 88 MMHG | SYSTOLIC BLOOD PRESSURE: 120 MMHG | WEIGHT: 175 LBS | RESPIRATION RATE: 16 BRPM | BODY MASS INDEX: 28.12 KG/M2 | HEART RATE: 72 BPM | HEIGHT: 66 IN | TEMPERATURE: 98.9 F

## 2019-04-16 DIAGNOSIS — R92.0 ABNORMAL MAMMOGRAM WITH MICROCALCIFICATION: Primary | ICD-10-CM

## 2019-04-16 DIAGNOSIS — N60.12 FIBROCYSTIC BREAST CHANGES, LEFT: ICD-10-CM

## 2019-04-16 PROCEDURE — 99203 OFFICE O/P NEW LOW 30 MIN: CPT | Performed by: SURGERY

## 2019-04-16 RX ORDER — POLYETHYLENE GLYCOL 3350, SODIUM CHLORIDE, SODIUM BICARBONATE, POTASSIUM CHLORIDE 420; 11.2; 5.72; 1.48 G/4L; G/4L; G/4L; G/4L
4000 POWDER, FOR SOLUTION ORAL ONCE
Qty: 4000 ML | Refills: 0 | Status: ON HOLD | OUTPATIENT
Start: 2019-04-16 | End: 2019-05-24

## 2019-04-16 RX ORDER — BISACODYL 5 MG/1
TABLET, DELAYED RELEASE ORAL
Qty: 2 TABLET | Refills: 0 | Status: ON HOLD | OUTPATIENT
Start: 2019-04-16 | End: 2019-05-24

## 2019-04-16 ASSESSMENT — PAIN SCALES - GENERAL: PAINLEVEL: NO PAIN (0)

## 2019-04-16 ASSESSMENT — MIFFLIN-ST. JEOR: SCORE: 1430.54

## 2019-04-16 NOTE — NURSING NOTE
"  How many children do you have? 2  What age did your menstrual cycle start? 12  How old were you when your first child was born? 28  Did you breast feed? yes  Are you on or have you ever taken any hormone replacement or birth control? no  Do you have a family history of breast cancer? yes  Carrie Johnson LPN..........4/16/2019  1:29 PM    Chief Complaint   Patient presents with     Consult     left breast       Initial /88 (BP Location: Right arm, Patient Position: Sitting, Cuff Size: Adult Regular)   Pulse 72   Temp 98.9  F (37.2  C) (Tympanic)   Resp 16   Ht 1.676 m (5' 6\")   Wt 79.4 kg (175 lb)   BMI 28.25 kg/m   Estimated body mass index is 28.25 kg/m  as calculated from the following:    Height as of this encounter: 1.676 m (5' 6\").    Weight as of this encounter: 79.4 kg (175 lb).  Medication Reconciliation: complete    Carrie Johnson LPN    "

## 2019-04-16 NOTE — PROGRESS NOTES
OFFICE CONSULTATION NOTE  Patient Name: Floresita Hill  Address: [address] [address]  Age:48 year old  Sex: female     Primary Care Physician:Dr. Crowder    I was requested to see thispatient in consultation by Dr. Crowder for evaluation of left breast microcalcifications. A copy of this note will be sent to Dr. Crowder.    HPI:   The patient is 48 year old female with new microcalcifications noted in the left breast on recent mammogram. The patient hasn't noted any skin, nipple or breast changes. No previous breast cancer. No previous breast biopsy. No family history of breast cancer. The patient had biopsy performed that showed proliferative fibrocystic change NO atypia or malignancy.      CONSULTATION ASSESSMENT AND PLAN/RECOMMENDATIONS:   I discussed with the patient the pathophysiology of microcalcifications and breast disease. We specifically discussed that most abnormalities seen on mammogram and US are not breast cancer. I explained that fibrocystic change is not a precancerous condition and that it doesn't increase future risk for breast cancer. I recommended a 6 month left breast mammogram to follow up breast changes after the recent biopsy.   Discusses breast tenderness in the perimenopausal time. Recommend VitD, E and warmth. Good support is important for decreasing tenderness. Avoid estrogen and estrogen like treatments if possible. The patient expressed understanding and denies further questions. The patient will call with questions or concerns.     REVIEW OF SYSTEMS  GENERAL: No fevers or chills. Denies fatigue, recent weight loss.  HEENT: No sinus drainage. No changeswith vision or hearing. No difficulty swallowing.   LYMPHATICS:  No swollen nodes in axilla, neck or groin.  CARDIOVASCULAR: Denies chest pain, palpitations and dyspnea on exertion.  PULMONARY: No shortness of breath or cough. No increase in sputum production.  GI: Denies melena, bright red blood in stools.  "No hematemesis. No constipation or diarrhea.  : No dysuria or hematuria.  SKIN: No recent rashes or ulcers.   HEMATOLOGY:  No history of easy bruising or bleeding.  ENDOCRINE:  Has diabetes and Hashimoto's.  NEUROLOGY:  No history of seizures or headaches. No motor or sensory changes.  BREAST:  Denies breast pain or changes.    PAST MEDICAL HISTORY  Past Medical History:   Diagnosis Date     Dysthymic disorder      Hashimoto's thyroiditis 3/29/2015     Iron deficiency anemia 11/16/2012     Type 1 diabetes mellitus without complications (H)     Dx at 10 years old,Uses [employer] Safety Insulin pump.     PAST SURGICAL HISTORY  Past Surgical History:   Procedure Laterality Date     ARTHROTOMY TEMPOROMANDIBULAR JOINT  1985     DILATION AND CURETTAGE      x3; benign     CURRENT MEDS  Current Outpatient Medications   Medication     aspirin EC 81 MG EC tablet     blood glucose (NO BRAND SPECIFIED) test strip     Blood Glucose Monitoring Suppl (Compression Kinetics BLOOD GLUCOSE MONITOR) W/DEVICE KIT     FLUoxetine (PROZAC) 10 MG capsule     insulin aspart (NOVOLOG VIAL) 100 UNITS/ML vial     insulin glargine (LANTUS) 100 UNIT/ML injection     insulin syringe-needle U-100 (B-D INSULIN SYRINGE ULTRAFINE) 29G X 1/2\" 0.5 ML     levothyroxine (SYNTHROID/LEVOTHROID) 100 MCG tablet     triamcinolone (KENALOG) 0.1 % cream     No current facility-administered medications for this visit.      ALLERGIES/SENSITIVITIES  Allergies   Allergen Reactions     Codeine Unknown     Loses consciousness     Hmg-Coa-R Inhibitors Muscle Pain (Myalgia)     Mild achiness, may try again in future     FAMILY HISTORY  Family History   Problem Relation Age of Onset     Kidney Disease Mother         s/p transplant list     Myasthenia gravis Mother      Hypothyroidism Sister      Bipolar Disorder Sister      Migraines Sister      Breast Cancer Maternal Aunt         Cancer-breast     Hearing Loss Daughter         bilateral     Other - See Comments Daughter         asd " "spectrum     Albinism Daughter      Anxiety Disorder Daughter      No Known Problems Daughter      SOCIAL HISTORY  Social History     Socioeconomic History     Marital status:      Spouse name: None     Number of children: None     Years of education: None     Highest education level: None   Occupational History     None   Social Needs     Financial resource strain: None     Food insecurity:     Worry: None     Inability: None     Transportation needs:     Medical: None     Non-medical: None   Tobacco Use     Smoking status: Never Smoker     Smokeless tobacco: Never Used   Substance and Sexual Activity     Alcohol use: Yes     Alcohol/week: 1.2 oz     Comment: Alcoholic Drinks/day: 3 days weekly     Drug use: No     Sexual activity: Not Currently     Partners: Male   Lifestyle     Physical activity:     Days per week: None     Minutes per session: None     Stress: None   Relationships     Social connections:     Talks on phone: None     Gets together: None     Attends Jain service: None     Active member of club or organization: None     Attends meetings of clubs or organizations: None     Relationship status: None     Intimate partner violence:     Fear of current or ex partner: None     Emotionally abused: None     Physically abused: None     Forced sexual activity: None   Other Topics Concern     Parent/sibling w/ CABG, MI or angioplasty before 65F 55M? Not Asked   Social History Narrative    , [place] from [place].   Sense of humor intact.   works at [employer] as  at [employer].  [employer].  2 Rebecca cruz (1999), Olga (2002)    Works in HR at [employer].       The above history was reviewed today, 4/16/2019  PHYSICAL EXAM  /88 (BP Location: Right arm, Patient Position: Sitting, Cuff Size: Adult Regular)   Pulse 72   Temp 98.9  F (37.2  C) (Tympanic)   Resp 16   Ht 1.676 m (5' 6\")   Wt 79.4 kg (175 lb)   BMI 28.25 kg/m      GENERAL: Healthy appearing patient in " no acute distress. Pleasant and cooperative with exam and interview.   HEENT:Head-normocephalic. Eyes-no scleral icterus, pupils equal, round, and reactive to light. Nose-no nasal drainage. No lesions. Mouth-oral mucosa pink and moist, no lesions.  NECK: Supple. No thyroid nodules. Tracheamidline.  LYMPHATICS:  No cervical, axillary or supraclavicular adenopathy.  CV: Regular rate and rhythm, no murmurs. No peripheral edema.  LUNGS:  No respiratory distress. Clear bilaterally to auscultation.  ABDOMEN: Non distended. Bowel sounds active. Soft, non-tender, no hepatosplenomegaly or hernias. No peritoneal signs.  SKIN: Pink, warm and dry. No jaundice. No rash.  NEURO:  Cranial nerves II-XII grossly intact.Alert and oriented.  PSYCH: Appropriate mood and affect.  BREAST: Breasts were examined in the seated and supine position. No mass noted bilaterally. No nipple changes or discharge bilaterally. Post biopsy changes noted left breast. Resolving ecchymosis with no sign of infection. Appropriate bilateral tenderness noted.    IMAGING/LAB  I personally reviewed patient's recent mammogram and biopsy images and reports and pathology report.

## 2019-04-16 NOTE — PATIENT INSTRUCTIONS
Follow up left mammogram in 6 months. For the breast tenderness: Recommend VitD (2000 units daily) , E (400 international unit(s) daily) and warmth. Good support is important for decreasing tenderness. Avoid estrogen and estrogen like treatments if possible.

## 2019-05-24 ENCOUNTER — ANESTHESIA EVENT (OUTPATIENT)
Dept: SURGERY | Facility: OTHER | Age: 49
End: 2019-05-24
Payer: COMMERCIAL

## 2019-05-24 ENCOUNTER — ANESTHESIA (OUTPATIENT)
Dept: SURGERY | Facility: OTHER | Age: 49
End: 2019-05-24
Payer: COMMERCIAL

## 2019-05-24 ENCOUNTER — HOSPITAL ENCOUNTER (OUTPATIENT)
Facility: OTHER | Age: 49
Discharge: HOME | End: 2019-05-24
Attending: SURGERY | Admitting: SURGERY
Payer: COMMERCIAL

## 2019-05-24 VITALS
RESPIRATION RATE: 16 BRPM | HEART RATE: 57 BPM | TEMPERATURE: 96.8 F | OXYGEN SATURATION: 99 % | BODY MASS INDEX: 27.94 KG/M2 | SYSTOLIC BLOOD PRESSURE: 128 MMHG | WEIGHT: 173.13 LBS | DIASTOLIC BLOOD PRESSURE: 91 MMHG

## 2019-05-24 DIAGNOSIS — Z12.11 SPECIAL SCREENING FOR MALIGNANT NEOPLASMS, COLON: Primary | ICD-10-CM

## 2019-05-24 PROCEDURE — 25000125 ZZHC RX 250: Performed by: NURSE ANESTHETIST, CERTIFIED REGISTERED

## 2019-05-24 PROCEDURE — 40000010 ZZH STATISTIC ANES STAT CODE-CRNA PER MINUTE: Performed by: SURGERY

## 2019-05-24 PROCEDURE — 45378 DIAGNOSTIC COLONOSCOPY: CPT | Performed by: SURGERY

## 2019-05-24 PROCEDURE — G0121 COLON CA SCRN NOT HI RSK IND: HCPCS | Performed by: SURGERY

## 2019-05-24 PROCEDURE — 25000125 ZZHC RX 250: Performed by: SURGERY

## 2019-05-24 PROCEDURE — 25000128 H RX IP 250 OP 636: Performed by: NURSE ANESTHETIST, CERTIFIED REGISTERED

## 2019-05-24 PROCEDURE — 45378 DIAGNOSTIC COLONOSCOPY: CPT | Performed by: NURSE ANESTHETIST, CERTIFIED REGISTERED

## 2019-05-24 PROCEDURE — 25800030 ZZH RX IP 258 OP 636: Performed by: SURGERY

## 2019-05-24 RX ORDER — PROPOFOL 10 MG/ML
INJECTION, EMULSION INTRAVENOUS PRN
Status: DISCONTINUED | OUTPATIENT
Start: 2019-05-24 | End: 2019-05-24

## 2019-05-24 RX ORDER — NALOXONE HYDROCHLORIDE 0.4 MG/ML
.1-.4 INJECTION, SOLUTION INTRAMUSCULAR; INTRAVENOUS; SUBCUTANEOUS
Status: DISCONTINUED | OUTPATIENT
Start: 2019-05-24 | End: 2019-05-24 | Stop reason: HOSPADM

## 2019-05-24 RX ORDER — LIDOCAINE 40 MG/G
CREAM TOPICAL
Status: DISCONTINUED | OUTPATIENT
Start: 2019-05-24 | End: 2019-05-24 | Stop reason: HOSPADM

## 2019-05-24 RX ORDER — PROPOFOL 10 MG/ML
INJECTION, EMULSION INTRAVENOUS CONTINUOUS PRN
Status: DISCONTINUED | OUTPATIENT
Start: 2019-05-24 | End: 2019-05-24

## 2019-05-24 RX ORDER — ONDANSETRON 2 MG/ML
4 INJECTION INTRAMUSCULAR; INTRAVENOUS EVERY 6 HOURS PRN
Status: DISCONTINUED | OUTPATIENT
Start: 2019-05-24 | End: 2019-05-24 | Stop reason: HOSPADM

## 2019-05-24 RX ORDER — ONDANSETRON 2 MG/ML
4 INJECTION INTRAMUSCULAR; INTRAVENOUS
Status: DISCONTINUED | OUTPATIENT
Start: 2019-05-24 | End: 2019-05-24 | Stop reason: HOSPADM

## 2019-05-24 RX ORDER — ONDANSETRON 4 MG/1
4 TABLET, ORALLY DISINTEGRATING ORAL EVERY 6 HOURS PRN
Status: DISCONTINUED | OUTPATIENT
Start: 2019-05-24 | End: 2019-05-24 | Stop reason: HOSPADM

## 2019-05-24 RX ORDER — FLUMAZENIL 0.1 MG/ML
0.2 INJECTION, SOLUTION INTRAVENOUS
Status: DISCONTINUED | OUTPATIENT
Start: 2019-05-24 | End: 2019-05-24 | Stop reason: HOSPADM

## 2019-05-24 RX ORDER — SODIUM CHLORIDE, SODIUM LACTATE, POTASSIUM CHLORIDE, CALCIUM CHLORIDE 600; 310; 30; 20 MG/100ML; MG/100ML; MG/100ML; MG/100ML
INJECTION, SOLUTION INTRAVENOUS CONTINUOUS
Status: DISCONTINUED | OUTPATIENT
Start: 2019-05-24 | End: 2019-05-24 | Stop reason: HOSPADM

## 2019-05-24 RX ORDER — LIDOCAINE HYDROCHLORIDE 20 MG/ML
INJECTION, SOLUTION INFILTRATION; PERINEURAL PRN
Status: DISCONTINUED | OUTPATIENT
Start: 2019-05-24 | End: 2019-05-24

## 2019-05-24 RX ADMIN — LIDOCAINE HYDROCHLORIDE 0.1 ML: 10 INJECTION, SOLUTION EPIDURAL; INFILTRATION; INTRACAUDAL; PERINEURAL at 08:40

## 2019-05-24 RX ADMIN — PROPOFOL 120 MG: 10 INJECTION, EMULSION INTRAVENOUS at 09:02

## 2019-05-24 RX ADMIN — SODIUM CHLORIDE, POTASSIUM CHLORIDE, SODIUM LACTATE AND CALCIUM CHLORIDE: 600; 310; 30; 20 INJECTION, SOLUTION INTRAVENOUS at 08:40

## 2019-05-24 RX ADMIN — LIDOCAINE HYDROCHLORIDE 40 MG: 20 INJECTION, SOLUTION INFILTRATION; PERINEURAL at 09:02

## 2019-05-24 RX ADMIN — PROPOFOL 160 MCG/KG/MIN: 10 INJECTION, EMULSION INTRAVENOUS at 09:02

## 2019-05-24 NOTE — ANESTHESIA PREPROCEDURE EVALUATION
"Anesthesia Pre-Procedure Evaluation    Patient: Floresita Hill   MRN: [id number] : 1970          Preoperative Diagnosis: screening    Procedure(s):  COLONOSCOPY    Past Medical History:   Diagnosis Date     Dysthymic disorder      Hashimoto's thyroiditis 3/29/2015     Iron deficiency anemia 2012     Type 1 diabetes mellitus without complications (H)     Dx at 10 years old,Uses Medtronic Insulin pump.     Past Surgical History:   Procedure Laterality Date     ARTHROTOMY TEMPOROMANDIBULAR JOINT  1985     BREAST BIOPSY, RT/LT Left 2019    benign     DILATION AND CURETTAGE      x3; benign       Anesthesia Evaluation     . Pt has had prior anesthetic.     No history of anesthetic complications          ROS/MED HX    ENT/Pulmonary:  - neg pulmonary ROS     Neurologic:  - neg neurologic ROS     Cardiovascular:  - neg cardiovascular ROS       METS/Exercise Tolerance:  >4 METS   Hematologic:  - neg hematologic  ROS       Musculoskeletal:  - neg musculoskeletal ROS       GI/Hepatic:  - neg GI/hepatic ROS       Renal/Genitourinary:  - ROS Renal section negative       Endo: Comment: Uses insulin pump, BG in the 60s this am, drank apple juice, current , Will keep insulin pump off throughout procedure and then will adjust insulin per self after procedure.     (+) type I DM, thyroid problem  hyperthyroidism, .      Psychiatric:  - neg psychiatric ROS       Infectious Disease:  - neg infectious disease ROS       Malignancy:      - no malignancy   Other: Comment: Pregnancy test refused, states \"not pregnant\"   (+) C-spine cleared: N/A, no H/O Chronic Pain,no other significant disability                         Physical Exam  Normal systems: cardiovascular, pulmonary and dental    Airway   Mallampati: III  TM distance: <3 FB  Neck ROM: full    Dental     Cardiovascular   Rhythm and rate: regular and normal      Pulmonary             Lab Results   Component Value Date    WBC 5.4 04/10/2019    " "HGB 13.2 04/10/2019    HCT 40.7 04/10/2019     04/10/2019     04/10/2019    POTASSIUM 3.9 04/10/2019    CHLORIDE 100 04/10/2019    CO2 30 04/10/2019    BUN 8 04/10/2019    CR 0.75 04/10/2019     (H) 04/10/2019    CECELIA 9.2 04/10/2019    PHOS 3.2 11/18/2015    ALBUMIN 4.1 04/10/2019    PROTTOTAL 7.0 04/10/2019    ALT 15 04/10/2019    AST 16 04/10/2019    ALKPHOS 50 04/10/2019    BILITOTAL 0.4 04/10/2019    LIPASE 22 01/12/2006    T4 0.81 04/10/2019    HCG Negative 12/14/2012       Preop Vitals  BP Readings from Last 3 Encounters:   04/16/19 120/88   04/12/19 118/82   04/10/19 116/70    Pulse Readings from Last 3 Encounters:   04/16/19 72   04/12/19 80   04/10/19 68      Resp Readings from Last 3 Encounters:   04/16/19 16   04/12/19 14   04/10/19 16    SpO2 Readings from Last 3 Encounters:   04/12/19 97%   04/10/19 98%   10/14/18 96%      Temp Readings from Last 1 Encounters:   04/16/19 98.9  F (37.2  C) (Tympanic)    Ht Readings from Last 1 Encounters:   04/16/19 1.676 m (5' 6\")      Wt Readings from Last 1 Encounters:   05/24/19 78.5 kg (173 lb 2 oz)    Estimated body mass index is 27.94 kg/m  as calculated from the following:    Height as of 4/16/19: 1.676 m (5' 6\").    Weight as of this encounter: 78.5 kg (173 lb 2 oz).       Anesthesia Plan      History & Physical Review      ASA Status:  2 .    NPO Status:  > 8 hours    Plan for MAC     Risks, benefits and alternatives discussed and would like to proceed. General anesthesia ok if indicated.         Postoperative Care      Consents  Anesthetic plan, risks, benefits and alternatives discussed with:  Patient and Spouse.  Use of blood products discussed: No .   .                 JAHAIRA Avelar CRNA  "

## 2019-05-24 NOTE — ANESTHESIA POSTPROCEDURE EVALUATION
Patient: Floresita Hill    Procedure(s):  COLONOSCOPY    Diagnosis:screening  Diagnosis Additional Information: No value filed.    Anesthesia Type:  MAC    Note:  Anesthesia Post Evaluation    Patient location during evaluation: Phase 2 and Endoscopy Recovery  Patient participation: Able to fully participate in evaluation  Level of consciousness: awake and alert  Pain management: adequate  Airway patency: patent  Cardiovascular status: acceptable  Respiratory status: acceptable  Hydration status: acceptable  PONV: none     Anesthetic complications: None          Last vitals:  Vitals:    05/24/19 0940 05/24/19 0945 05/24/19 1000   BP: 122/73 114/66 122/72   Pulse: 56 57 53   Resp: 16     Temp: 96.8  F (36  C)     SpO2: 100% 100% 100%         Electronically Signed By: JAHAIRA Pete CRNA  May 24, 2019  10:21 AM

## 2019-05-24 NOTE — ANESTHESIA CARE TRANSFER NOTE
Patient: Floresita Hill    Procedure(s):  COLONOSCOPY    Diagnosis: screening  Diagnosis Additional Information: No value filed.    Anesthesia Type:   MAC     Note:  Airway :Nasal Cannula  Patient transferred to:Phase II  Handoff Report: Identifed the Patient, Identified the Reponsible Provider, Reviewed the pertinent medical history, Discussed the surgical course, Reviewed Intra-OP anesthesia mangement and issues during anesthesia, Set expectations for post-procedure period and Allowed opportunity for questions and acknowledgement of understanding      Vitals: (Last set prior to Anesthesia Care Transfer)    CRNA VITALS  5/24/2019 0907 - 5/24/2019 1007      5/24/2019             Resp Rate (set):  10                Electronically Signed By: JAHAIRA Pete CRNA  May 24, 2019  10:22 AM

## 2019-05-24 NOTE — OP NOTE
PROCEDURE NOTE    SURGEON:Maria Victoria Dubose MD.    PRE-OP DIAGNOSIS:  Screening Colonoscopy      POST-OP DIAGNOSIS: normal colon    PROCEDURE:  Screening colonoscopy    ESTIMATED BLOODLOSS: none    COMPLICATIONS:  None    SPECIMEN:  none    ANESTHESIA:  See anesthesia note    INDICATION FOR THE PROCEDURE: The patient is a 49 year old female. The patient has no complaints. I explained to the patient the risks, benefits and alternatives to screening colonoscopy for evaluating the colon for colon polyps and colon cancer. We specifically discussed the risks of bleeding, infection, perforation, potential inability to reach the cecum and the risks of sedation. The patient's questions were answered and the patient wished to proceed. Informed consent paperwork was completed.    PROCEDURE: The patient was taken to the endoscopy suite. Appropriate monitors were attached. The patient was placed in the left lateral decubitus position.Timeout was performed confirming the patient's identity and procedure to be performed. After appropriate sedation was confirmed, digital rectal exam was performed. There was normal tone and no gross abnormality was noted. The lubricated colonoscope was introduced into the anus the colon was insufflated with air. The prep quality was adequate. Under direct visualization the scope was advanced to the cecum. The ileocecal valve was intubated and the terminal ileum inspected. No gross abnormality was noted. The scope was withdrawn back into the cecum. The mucosa of colon was inspected while withdrawing the scope. No abnormalities were noted. The scope was retroflexed in the rectum and the anorectal junction was inspected. No abnormalities were noted. The scope was returned to a neutral position and the colon was decompressed. The scope was removed. The patient tolerated the procedure with no immediately apparent complication. The patient was taken to recovery in stable condition.    FOLLOW UP:RECOMMEND  high fiber diet, follow up: 10 year screening colonoscopy.

## 2019-05-24 NOTE — H&P
"PRE-PROCEDURE NOTE    CHIEF COMPLAINT / REASON FORPROCEDURE:  Need for screening colonoscopy.    PERTINENT HISTORY   Patient with no complaints. Previous colonoscopy none. No diarrhea, constipation, abdominal pain or rectal bleeding. No family history of colon polyps or colon cancer.  Past Medical History:   Diagnosis Date     Dysthymic disorder      Hashimoto's thyroiditis 3/29/2015     Iron deficiency anemia 11/16/2012     Type 1 diabetes mellitus without complications (H)     Dx at 10 years old,Uses Medtronic Insulin pump.     Past Surgical History:   Procedure Laterality Date     ARTHROTOMY TEMPOROMANDIBULAR JOINT  1985     BREAST BIOPSY, RT/LT Left 04/12/2019    benign     DILATION AND CURETTAGE      x3; benign     Other:  None  Bleeding tendencies:  No    Relevant Family History:  none    Relevant Social History:  none    A relevant review of systems was performed and was Negative.    ALLERGIES/SENSITIVITIES:   Allergies   Allergen Reactions     Codeine Unknown     Loses consciousness     Hmg-Coa-R Inhibitors Muscle Pain (Myalgia)     Mild achiness, may try again in future        CURRENTMEDICATIONS:      No current facility-administered medications on file prior to encounter.   Current Outpatient Medications on File Prior to Encounter:  bisacodyl (DULCOLAX) 5 MG EC tablet Take as directed by colonoscopy prep.   blood glucose (NO BRAND SPECIFIED) test strip Use to test blood sugars 5 times daily with pump in Vitro 90 days   Blood Glucose Monitoring Suppl (KILTR BLOOD GLUCOSE MONITOR) W/DEVICE KIT Dispense glucose meter, test strips and lancets covered by the patient insurance. Test 5 times per day. 3 month supply with 4 refills.   FLUoxetine (PROZAC) 10 MG capsule Take 3 capsules (30 mg) by mouth every morning   insulin aspart (NOVOLOG VIAL) 100 UNITS/ML vial Per pump Medtronic subcutaneous administration   insulin syringe-needle U-100 (B-D INSULIN SYRINGE ULTRAFINE) 29G X 1/2\" 0.5 ML For administering " insulin at home.   levothyroxine (SYNTHROID/LEVOTHROID) 100 MCG tablet Take 1 tablet (100 mcg) by mouth every morning (before breakfast)   aspirin EC 81 MG EC tablet Take 81 mg by mouth daily with food   insulin glargine (LANTUS) 100 UNIT/ML injection Inject 20 Units Subcutaneous At Bedtime Before bedtime   [] polyethylene glycol-electrolytes (NULYTELY) 420 g solution Take 4,000 mLs by mouth once for 1 dose   triamcinolone (KENALOG) 0.1 % cream      Current Facility-Administered Medications   Medication     lactated ringers infusion     lidocaine (LMX4) cream     lidocaine 1 % 0.1-1 mL     ondansetron (ZOFRAN) injection 4 mg     sodium chloride (PF) 0.9% PF flush 3 mL     sodium chloride (PF) 0.9% PF flush 3 mL       PRE-SEDATION ASSESSMENT:    /74   Temp 97.5  F (36.4  C) (Tympanic)   Resp 16   Wt 78.5 kg (173 lb 2 oz)   SpO2 95%   Breastfeeding? No   BMI 27.94 kg/m    Lung Exam:  Normal  Heart Exam:  Normal    Comment(s):      IMPRESSION:  Need for screening colonoscopy.    PLAN:  I discussed screening colonoscopy with the patient.

## 2019-05-24 NOTE — DISCHARGE INSTRUCTIONS
Procedure you had done: colonoscopy  Your health care provider is:  No Ref-Primary, Physician  Your surgeon is Dr. Maria Victoria Dubose.   Please call your health care provider or surgeon at [phone] if:    - you feel you are getting worse or having an increase in problems    - fever greater than 101 degrees  - increasing shortness of breath or chest pain  - any signs of infection (increasing redness, swelling, tenderness, warmth, change in appearance, or  increased drainage)  - blood in your urine or stool  - coughing or vomiting blood  - nausea (upset stomach) and vomiting and/or diarrhea that will not stop  - severe pain that is not relieved by medicine, rest or ice  You have had medications for sedation. Please be aware that this can cause drowsiness and impaired judgment for up to 24 hours after your procedure. Do not drive, operate power tools or drink alcohol for 24 hours.  If samples were taken-you will get a phone call and a letter with your results in the next 7-10 days. If you don't get results, please call and let us know!     [facility]  Adult Discharge Orders & Instructions    ________________________________________________________________          For 12 hours after surgery  1. Get plenty of rest.  A responsible adult must stay with you for at least 12 hours after you leave the hospital.   2. You may feel lightheaded.  IF so, sit for a few minutes before standing.  Have someone help you get up.   3. You may have a slight fever. Call the doctor if your fever is over 101 F (38.3 C) (taken under the tongue) or lasts longer than 24 hours.  4. You may have a dry mouth, a sore throat, muscle aches or trouble sleeping.  These should go away after 24 hours.  5. Do not make important or legal decisions.  6.   Do not drive or use heavy equipment.  If you have weakness or tingling, don't drive or use heavy equipment until this feeling goes away.    To contact a doctor, call    [phone]_______________________

## 2019-07-21 ENCOUNTER — OFFICE VISIT (OUTPATIENT)
Dept: FAMILY MEDICINE | Facility: OTHER | Age: 49
End: 2019-07-21
Attending: PHYSICIAN ASSISTANT
Payer: COMMERCIAL

## 2019-07-21 VITALS
TEMPERATURE: 96.8 F | WEIGHT: 173.9 LBS | RESPIRATION RATE: 16 BRPM | OXYGEN SATURATION: 97 % | DIASTOLIC BLOOD PRESSURE: 86 MMHG | BODY MASS INDEX: 27.95 KG/M2 | HEIGHT: 66 IN | SYSTOLIC BLOOD PRESSURE: 126 MMHG | HEART RATE: 63 BPM

## 2019-07-21 DIAGNOSIS — L03.116 CELLULITIS OF LEFT LOWER EXTREMITY: Primary | ICD-10-CM

## 2019-07-21 PROCEDURE — 99213 OFFICE O/P EST LOW 20 MIN: CPT | Performed by: FAMILY MEDICINE

## 2019-07-21 RX ORDER — CEPHALEXIN 500 MG/1
500 CAPSULE ORAL 4 TIMES DAILY
Qty: 28 CAPSULE | Refills: 0 | Status: SHIPPED | OUTPATIENT
Start: 2019-07-21 | End: 2019-07-28

## 2019-07-21 ASSESSMENT — PAIN SCALES - GENERAL: PAINLEVEL: MILD PAIN (2)

## 2019-07-21 ASSESSMENT — MIFFLIN-ST. JEOR: SCORE: 1420.56

## 2019-07-21 NOTE — NURSING NOTE
Patient has not been feeling well for 1 day. Their symptoms are swelling in left foot, Pt. got stung by a bee on 07/14/19 in the left ankle.  Melanie Valencia LPN....................  7/21/2019   2:20 PM

## 2019-07-21 NOTE — PROGRESS NOTES
Nursing Notes:   Melanie Valencia LPN  7/21/2019  2:30 PM  Signed  Patient has not been feeling well for 1 day. Their symptoms are swelling in left foot, Pt. got stung by a bee on 07/14/19 in the left ankle.  Melanie Valencia LPN....................  7/21/2019   2:20 PM      SUBJECTIVE:   Floresita Hill is a 49 year old female who presents to clinic today for the following health issues:    HPI  Floresita is here for concerns of swelling of her L ankle.  Started ~1 week ago after being stung by a hornet on her walk with her dog.  Was okay and only slightly swollen to start off with, but now - noticeable swollen and some redness developing over lateral ankle.  Has tried Benadryl, ice, ibuprofen.  Becomes tender the more she is on her feet.  Did spend the last day traveling back from [place]; sitting in the car with her feet down and laptop on her lap working.     Patient Active Problem List    Diagnosis Date Noted     Type 1 diabetes mellitus without complication (H) 11/18/2015     Priority: Medium     Overview:   Diagnosed age 10;  On insulin pump followed by [place] endocrinologist       Hashimoto's thyroiditis 03/29/2015     Priority: Medium     IUD (intrauterine device) in place 02/17/2014     Priority: Medium     Iron deficiency anemia 11/16/2012     Priority: Medium     Past Medical History:   Diagnosis Date     Dysthymic disorder      Hashimoto's thyroiditis 3/29/2015     Iron deficiency anemia 11/16/2012     Type 1 diabetes mellitus without complications (H)     Dx at 10 years old,Uses Medtronic Insulin pump.      Past Surgical History:   Procedure Laterality Date     ARTHROTOMY TEMPOROMANDIBULAR JOINT  1985     BREAST BIOPSY, RT/LT Left 04/12/2019    benign     COLONOSCOPY  05/24/2019    normal results, follow up 10 years     COLONOSCOPY N/A 5/24/2019    Procedure: COLONOSCOPY;  Surgeon: Maria Victoria Dubose MD;  Location: [facility] OR     DILATION AND CURETTAGE      x3; benign     Family History   Problem Relation  "Age of Onset     Kidney Disease Mother         s/p transplant list     Myasthenia gravis Mother      Hypothyroidism Sister      Bipolar Disorder Sister      Migraines Sister      Breast Cancer Maternal Aunt         Cancer-breast     Hearing Loss Daughter         bilateral     Other - See Comments Daughter         asd spectrum     Albinism Daughter      Anxiety Disorder Daughter      No Known Problems Daughter      Social History     Tobacco Use     Smoking status: Never Smoker     Smokeless tobacco: Never Used   Substance Use Topics     Alcohol use: Yes     Alcohol/week: 1.2 oz     Comment: Alcoholic Drinks/day: 3 days weekly     Social History     Social History Narrative    , moved here from [place].   Sense of humor intact.   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  Eugene - .  2 anthonyIonage (1999), Olga (2002)    Works in HR at [employer].       Current Outpatient Medications   Medication Sig Dispense Refill     aspirin EC 81 MG EC tablet Take 81 mg by mouth daily with food       blood glucose (NO BRAND SPECIFIED) test strip Use to test blood sugars 5 times daily with pump in Vitro 90 days 450 strip 11     Blood Glucose Monitoring Suppl ([employer] BLOOD GLUCOSE MONITOR) W/DEVICE KIT Dispense glucose meter, test strips and lancets covered by the patient insurance. Test 5 times per day. 3 month supply with 4 refills.       FLUoxetine (PROZAC) 10 MG capsule Take 3 capsules (30 mg) by mouth every morning 180 capsule 3     insulin aspart (NOVOLOG VIAL) 100 UNITS/ML vial Per pump Medtronic subcutaneous administration 60 mL 4     insulin glargine (LANTUS) 100 UNIT/ML injection Inject 20 Units Subcutaneous At Bedtime Before bedtime 3 vial 3     insulin syringe-needle U-100 (B-D INSULIN SYRINGE ULTRAFINE) 29G X 1/2\" 0.5 ML For administering insulin at home.       levothyroxine (SYNTHROID/LEVOTHROID) 100 MCG tablet Take 1 tablet (100 mcg) by mouth every morning (before breakfast) 90 tablet 4     " "triamcinolone (KENALOG) 0.1 % cream        Allergies   Allergen Reactions     Codeine Unknown     Loses consciousness     Hmg-Coa-R Inhibitors Muscle Pain (Myalgia)     Mild achiness, may try again in future     Review of Systems   All other systems reviewed and are negative.     OBJECTIVE:     /86 (BP Location: Right arm, Patient Position: Sitting, Cuff Size: Adult Regular)   Pulse 63   Temp 96.8  F (36  C) (Tympanic)   Resp 16   Ht 1.676 m (5' 6\")   Wt 78.9 kg (173 lb 14.4 oz)   SpO2 97%   BMI 28.07 kg/m    Body mass index is 28.07 kg/m .  Physical Exam   Constitutional: She appears well-developed and well-nourished.   HENT:   Head: Normocephalic and atraumatic.   Right Ear: External ear normal.   Left Ear: External ear normal.   Nose: Nose normal.   Mouth/Throat: Oropharynx is clear and moist.   Cardiovascular: Normal rate and intact distal pulses.   Pulmonary/Chest: Effort normal and breath sounds normal.   Vitals reviewed.    Diagnostic Test Results:  none     ASSESSMENT/PLAN:     1. Cellulitis of left lower extremity  Elevate TID; epsom salt soaks TID.  Start cephalexin 500mg QID for developing cellulitis, however likely more swollen today because of travel.  S/S of worsening discussed and RTC prn.  - cephALEXin (KEFLEX) 500 MG capsule; Take 1 capsule (500 mg) by mouth 4 times daily for 7 days  Dispense: 28 capsule; Refill: 0      Susan Foster DO  [facility]  "

## 2019-10-02 ENCOUNTER — HEALTH MAINTENANCE LETTER (OUTPATIENT)
Age: 49
End: 2019-10-02

## 2019-10-04 ENCOUNTER — HOSPITAL ENCOUNTER (OUTPATIENT)
Dept: MAMMOGRAPHY | Facility: OTHER | Age: 49
Discharge: HOME | End: 2019-10-04
Attending: INTERNAL MEDICINE | Admitting: INTERNAL MEDICINE
Payer: COMMERCIAL

## 2019-10-04 DIAGNOSIS — R92.8 ABNORMAL MAMMOGRAM: ICD-10-CM

## 2019-10-04 DIAGNOSIS — Z09 FOLLOW-UP EXAM, 3-6 MONTHS SINCE PREVIOUS EXAM: ICD-10-CM

## 2019-10-04 PROCEDURE — G0279 TOMOSYNTHESIS, MAMMO: HCPCS

## 2019-10-30 ENCOUNTER — HEALTH MAINTENANCE LETTER (OUTPATIENT)
Age: 49
End: 2019-10-30

## 2019-11-18 ENCOUNTER — TRANSFERRED RECORDS (OUTPATIENT)
Dept: HEALTH INFORMATION MANAGEMENT | Facility: OTHER | Age: 49
End: 2019-11-18

## 2020-05-04 DIAGNOSIS — E10.9 TYPE 1 DIABETES MELLITUS WITHOUT COMPLICATION (H): ICD-10-CM

## 2020-05-04 DIAGNOSIS — E06.3 HYPOTHYROIDISM DUE TO HASHIMOTO'S THYROIDITIS: ICD-10-CM

## 2020-05-04 NOTE — LETTER
May 5, 2020      Floresita Hill  [address] [address]        Dear Floresita,     A refill request was received from your pharmacy for Insulin and Levothyroxine.    Additional refills require an office visit with Dr. Crowder for annual review and labs    Please call [phone] to schedule appointment.          Sincerely,      Refill Nurse

## 2020-05-05 NOTE — TELEPHONE ENCOUNTER
Routing refill request to provider for review/approval because:  Labs not current:    Patient needs to be seen because it has been more than 1 year since last office visit.    LOV: and labs: 4/10/19  Ligia Jha RN on 5/5/2020 at 2:55 PM

## 2020-05-06 RX ORDER — LEVOTHYROXINE SODIUM 100 UG/1
100 TABLET ORAL
Qty: 90 TABLET | Refills: 0 | Status: SHIPPED | OUTPATIENT
Start: 2020-05-06 | End: 2020-05-07

## 2020-05-07 ENCOUNTER — OFFICE VISIT (OUTPATIENT)
Dept: INTERNAL MEDICINE | Facility: OTHER | Age: 50
End: 2020-05-07
Attending: INTERNAL MEDICINE
Payer: COMMERCIAL

## 2020-05-07 VITALS
OXYGEN SATURATION: 98 % | SYSTOLIC BLOOD PRESSURE: 111 MMHG | HEART RATE: 74 BPM | DIASTOLIC BLOOD PRESSURE: 80 MMHG | WEIGHT: 180.6 LBS | HEIGHT: 65 IN | BODY MASS INDEX: 30.09 KG/M2 | TEMPERATURE: 97.9 F

## 2020-05-07 DIAGNOSIS — E10.9 TYPE 1 DIABETES MELLITUS WITHOUT COMPLICATION (H): Primary | ICD-10-CM

## 2020-05-07 DIAGNOSIS — F34.1 DYSTHYMIC DISORDER: ICD-10-CM

## 2020-05-07 DIAGNOSIS — M51.369 DDD (DEGENERATIVE DISC DISEASE), LUMBAR: ICD-10-CM

## 2020-05-07 DIAGNOSIS — E06.3 HYPOTHYROIDISM DUE TO HASHIMOTO'S THYROIDITIS: ICD-10-CM

## 2020-05-07 DIAGNOSIS — E10.9 TYPE 1 DIABETES MELLITUS WITHOUT COMPLICATION (H): ICD-10-CM

## 2020-05-07 DIAGNOSIS — R20.0 LEFT LEG NUMBNESS: ICD-10-CM

## 2020-05-07 DIAGNOSIS — Z79.899 HIGH RISK MEDICATION USE: ICD-10-CM

## 2020-05-07 LAB
ALBUMIN SERPL-MCNC: 4.2 G/DL (ref 3.5–5.7)
ALP SERPL-CCNC: 55 U/L (ref 34–104)
ALT SERPL W P-5'-P-CCNC: 12 U/L (ref 7–52)
ANION GAP SERPL CALCULATED.3IONS-SCNC: 9 MMOL/L (ref 3–14)
AST SERPL W P-5'-P-CCNC: 14 U/L (ref 13–39)
BILIRUB SERPL-MCNC: 0.5 MG/DL (ref 0.3–1)
BUN SERPL-MCNC: 16 MG/DL (ref 7–25)
CALCIUM SERPL-MCNC: 9.5 MG/DL (ref 8.6–10.3)
CHLORIDE SERPL-SCNC: 99 MMOL/L (ref 98–107)
CHOLEST SERPL-MCNC: 255 MG/DL
CO2 SERPL-SCNC: 29 MMOL/L (ref 21–31)
CREAT SERPL-MCNC: 0.83 MG/DL (ref 0.6–1.2)
ERYTHROCYTE [DISTWIDTH] IN BLOOD BY AUTOMATED COUNT: 12.4 % (ref 10–15)
GFR SERPL CREATININE-BSD FRML MDRD: 72 ML/MIN/{1.73_M2}
GLUCOSE SERPL-MCNC: 218 MG/DL (ref 70–105)
HBA1C MFR BLD: 7.2 % (ref 4–6)
HCT VFR BLD AUTO: 39.2 % (ref 35–47)
HDLC SERPL-MCNC: 84 MG/DL (ref 23–92)
HGB BLD-MCNC: 13.2 G/DL (ref 11.7–15.7)
LDLC SERPL CALC-MCNC: 156 MG/DL
MCH RBC QN AUTO: 31.2 PG (ref 26.5–33)
MCHC RBC AUTO-ENTMCNC: 33.7 G/DL (ref 31.5–36.5)
MCV RBC AUTO: 93 FL (ref 78–100)
NONHDLC SERPL-MCNC: 171 MG/DL
PLATELET # BLD AUTO: 310 10E9/L (ref 150–450)
POTASSIUM SERPL-SCNC: 4.1 MMOL/L (ref 3.5–5.1)
PROT SERPL-MCNC: 7 G/DL (ref 6.4–8.9)
RBC # BLD AUTO: 4.23 10E12/L (ref 3.8–5.2)
SODIUM SERPL-SCNC: 137 MMOL/L (ref 134–144)
T4 FREE SERPL-MCNC: 0.77 NG/DL (ref 0.6–1.6)
TRIGL SERPL-MCNC: 76 MG/DL
TSH SERPL DL<=0.05 MIU/L-ACNC: 15.01 IU/ML (ref 0.34–5.6)
WBC # BLD AUTO: 6.4 10E9/L (ref 4–11)

## 2020-05-07 PROCEDURE — 36415 COLL VENOUS BLD VENIPUNCTURE: CPT | Mod: ZL | Performed by: INTERNAL MEDICINE

## 2020-05-07 PROCEDURE — 80061 LIPID PANEL: CPT | Mod: ZL | Performed by: INTERNAL MEDICINE

## 2020-05-07 PROCEDURE — 85027 COMPLETE CBC AUTOMATED: CPT | Mod: ZL | Performed by: INTERNAL MEDICINE

## 2020-05-07 PROCEDURE — 84439 ASSAY OF FREE THYROXINE: CPT | Mod: ZL | Performed by: INTERNAL MEDICINE

## 2020-05-07 PROCEDURE — 80053 COMPREHEN METABOLIC PANEL: CPT | Mod: ZL | Performed by: INTERNAL MEDICINE

## 2020-05-07 PROCEDURE — 99396 PREV VISIT EST AGE 40-64: CPT | Performed by: INTERNAL MEDICINE

## 2020-05-07 PROCEDURE — 83036 HEMOGLOBIN GLYCOSYLATED A1C: CPT | Mod: ZL | Performed by: INTERNAL MEDICINE

## 2020-05-07 PROCEDURE — 84443 ASSAY THYROID STIM HORMONE: CPT | Mod: ZL | Performed by: INTERNAL MEDICINE

## 2020-05-07 RX ORDER — FLUOXETINE 10 MG/1
30 CAPSULE ORAL EVERY MORNING
Qty: 180 CAPSULE | Refills: 3 | Status: SHIPPED | OUTPATIENT
Start: 2020-05-07 | End: 2021-03-12

## 2020-05-07 RX ORDER — LEVOTHYROXINE SODIUM 100 UG/1
100 TABLET ORAL
Qty: 90 TABLET | Refills: 3 | Status: SHIPPED | OUTPATIENT
Start: 2020-05-07 | End: 2020-05-11

## 2020-05-07 ASSESSMENT — MIFFLIN-ST. JEOR: SCORE: 1430.08

## 2020-05-07 ASSESSMENT — PAIN SCALES - GENERAL: PAINLEVEL: NO PAIN (0)

## 2020-05-07 ASSESSMENT — PATIENT HEALTH QUESTIONNAIRE - PHQ9: SUM OF ALL RESPONSES TO PHQ QUESTIONS 1-9: 6

## 2020-05-07 NOTE — PROGRESS NOTES
Previous A1C is at goal of <8  Lab Results   Component Value Date    A1C 7.8 04/10/2019    A1C 6.9 03/02/2018     Urine microalbumin:creatine:   Foot exam 6 months  Eye exam 6 months   Tobacco User no  Patient is on a daily aspirin  Patient is on a Statin.  Blood pressure today of:     BP Readings from Last 1 Encounters:   05/07/20 111/80      is at the goal of <139/89 for diabetics.    SAMUEL MASCORRO LPN on 5/7/2020 at 4:16 PM

## 2020-05-07 NOTE — NURSING NOTE
"Chief Complaint   Patient presents with     Physical     Yearly       Patient presents today in clinic for the following Physical and  having hot flash and back pai0n.    Initial There were no vitals taken for this visit. Estimated body mass index is 28.07 kg/m  as calculated from the following:    Height as of 7/21/19: 1.676 m (5' 6\").    Weight as of 7/21/19: 78.9 kg (173 lb 14.4 oz).  Medication Reconciliation: complete    LUDWIN, FLINCK, LPN  "

## 2020-05-07 NOTE — PROGRESS NOTES
Chief Complaint   Patient presents with     Physical     Yearly         HPI: Ms. Casey Hill is a 50 year old female who presents today for yearly physical.  She overall is feeling good.      She has had ongoing issues with back pain.  It comes and goes.  With activity she has left leg numbness and it is better with sitting.  It recovers with rest and then returns.  It is not consistent with distance or shoe use.  She used to have shooting pain however now it is more achey numbness with tingling at times.  No trauma.  She has not had any bowel incontinence but does feel she has some urgency.         She does not feel this is similar to the sciatica she has had in the past.  She had an MRI in 2016 that showed a disc bulge at L4-5 and L5-S1.  She feels symptoms have progressed down leg more now.        She is due for renewal of medications.  She is on Prozac which she takes for mood.  She denies any side effects from this.  She is currently on 30 mg daily.  She continues on levothyroxine 112 mcg.  She denies any signs of high or low thyroid.    She continues with her insulin pump for her diabetes.  She does follow with endocrinology once a year.  Her A1c is within a controlled range.  She denies any significant lows.    Urine microalbumin:creatine: done in 09/2019  Foot exam done in 09/2019  Eye exam 6 months   Tobacco User no  Patient is on a daily aspirin  Patient is not on a Statin. Due to low risk of age  Blood pressure today of:     BP Readings from Last 1 Encounters:   05/07/20 111/80      is at the goal of <139/89 for diabetics.    History is discussed and updated on 5/7/2020 with patient.  It is current to the best of my knowledge as below.    Past Medical History:   Diagnosis Date     Dysthymic disorder      Hashimoto's thyroiditis 3/29/2015     Iron deficiency anemia 11/16/2012     Type 1 diabetes mellitus without complications (H)     Dx at 10 years old,Uses Medtronic Insulin pump.        Past  "Surgical History:   Procedure Laterality Date     ARTHROTOMY TEMPOROMANDIBULAR JOINT  1985     BREAST BIOPSY, RT/LT Left 04/12/2019    benign     COLONOSCOPY  05/24/2019    normal results, follow up 10 years     COLONOSCOPY N/A 5/24/2019    Procedure: COLONOSCOPY;  Surgeon: Maria Victoria Dubose MD;  Location: [facility] OR     DILATION AND CURETTAGE      x3; benign         Current Outpatient Medications   Medication Sig Dispense Refill     blood glucose (NO BRAND SPECIFIED) test strip Use to test blood sugars 5 times daily with pump in Vitro 90 days 450 strip 11     Blood Glucose Monitoring Suppl ([employer] (TEN) BLOOD GLUCOSE MONITOR) W/DEVICE KIT Dispense glucose meter, test strips and lancets covered by the patient insurance. Test 5 times per day. 3 month supply with 4 refills.       FLUoxetine (PROZAC) 10 MG capsule Take 3 capsules (30 mg) by mouth every morning 180 capsule 3     insulin aspart (NOVOLOG VIAL) 100 UNITS/ML vial Per pump [employer] subcutaneous administration 40 mL 4     insulin glargine (LANTUS) 100 UNIT/ML injection Inject 20 Units Subcutaneous At Bedtime Before bedtime 3 vial 3     insulin syringe-needle U-100 (B-D INSULIN SYRINGE ULTRAFINE) 29G X 1/2\" 0.5 ML For administering insulin at home.       triamcinolone (KENALOG) 0.1 % cream        aspirin EC 81 MG EC tablet Take 81 mg by mouth daily with food       levothyroxine (SYNTHROID/LEVOTHROID) 112 MCG tablet Take 1 tablet (112 mcg) by mouth every morning (before breakfast) Increase in dose 90 tablet 0       Allergies   Allergen Reactions     Codeine Unknown     Loses consciousness     Hmg-Coa-R Inhibitors Muscle Pain (Myalgia)     Mild achiness, may try again in future        Family History   Problem Relation Age of Onset     Kidney Disease Mother         s/p transplant      Myasthenia gravis Mother      Lung Cancer Mother      Hypothyroidism Sister      Bipolar Disorder Sister      Migraines Sister      Breast Cancer Maternal Aunt         Cancer-breast     Hearing " "Loss Daughter         bilateral     Other - See Comments Daughter         asd spectrum     Albinism Daughter      Anxiety Disorder Daughter      No Known Problems Daughter        Family Status   Relation Name Status     Fa   at age 53         in motorcycle accident     Mo  Alive     Sis Fidel Alive     MAunt  (Not Specified)     Araceli Rebecca Alive     Araceli Olga Alive        Social History     Tobacco Use     Smoking status: Never Smoker     Smokeless tobacco: Never Used   Substance Use Topics     Alcohol use: Yes     Alcohol/week: 2.0 standard drinks     Comment: Alcoholic Drinks/day: 3 days weekly       Social History     Social History Narrative    , moved here from [place].   Sense of humor intact.   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  Eugene - .  2 kids, Rebecca (), Olga ()    Works in HR at [employer]               ROS  GEN:-fevers/-chills/-night sweats/+wt change -7 pound weight gain from last year  NEURO: -headaches/-vision changes  EARS: -hearing changes  NOSE: -drainage/-congestion  MOUTH/THROAT: - sore throat/-dysphagia/-sores  LUNGS: -sob/-cough  CARDIOVASCULAR: -cp/-palpitations  GI: -pain/-change in bowels/-bloody stools  : -change in bladder/-vaginal discharge or bleeding  ENDOCRINE: -skin or hair changes  HEMATOLOGIC/LYMPHATIC: -swollen nodes  SKIN: -rashes/-lesions  MSK/RHEUM: + Occasional joint pain/-swelling/-falls  NEURO: -weakness/-parasthesias  PSYCH:+depression/-anxiety     EXAM:   /80 (BP Location: Right arm, Patient Position: Left side, Cuff Size: Adult Regular)   Pulse 74   Temp 97.9  F (36.6  C) (Tympanic)   Ht 1.651 m (5' 5\")   Wt 81.9 kg (180 lb 9.6 oz)   LMP 2020   SpO2 98%   BMI 30.05 kg/m    Estimated body mass index is 30.05 kg/m  as calculated from the following:    Height as of this encounter: 1.651 m (5' 5\").    Weight as of this encounter: 81.9 kg (180 lb 9.6 oz).      GEN: Vitals reviewed. Healthy appearing. Patient " is in no acute distress. Cooperative with exam.  HEENT: Normocephalic atraumatic.  Normal external eye, conjunctiva, lids, cornea with no scleral icterus or conjunctival erythema. Pupils equally round.   NECK: Supple; no thyromegaly or masses noted.    Lungs: Chest rise equal bilaterally.  No accessory muscle use.  ABD: Nondistended  SKIN: Warm and dry to touch.  No rash on face, arms and legs.  EXT: No clubbing or cyanosis.  No peripheral edema.  NEURO: Alert and oriented to person, place, and time.  Cranial nerves II-XII grossly intact with no focal or lateralizing deficits.  Muscle tone normal.  Gait normal. No tremor.   MSK: ROM of upper and lower ext symmetric and full.  PSYCH: Mood is good.  Affect appropriate. Speech fluent. Answers questions appropriately and thought process normal.       ASSESSMENT AND PLAN:    Type 1 diabetes mellitus without complication (H)  - stable, continue with endocrine  - insulin aspart (NOVOLOG VIAL) 100 UNITS/ML vial  Dispense: 40 mL; Refill: 4  - Comprehensive Metabolic Panel  - Hemoglobin A1c  - Lipid Panel    Hypothyroidism due to Hashimoto's thyroiditis  - TSH is elevated again, similar to 1 year ago, increase Levothyroxine slightly to 112mcg daily, plan for recheck in 3 months  - TSH Reflex [facility]    Dysthymic disorder  - Stable.  Continue current regimen.    - FLUoxetine (PROZAC) 10 MG capsule  Dispense: 180 capsule; Refill: 3    High risk medication use  - CBC W PLT No Diff    Left leg numbness  - with degenerative disease in past and worsening radicular symptoms, will repeat MR and pending results consider referral to neurosurg  - MR Lumbar Spine w/o Contrast    DDD (degenerative disc disease), lumbar  - see above  - MR Lumbar Spine w/o Contrast          Return in about 1 year (around 5/7/2021) for Annual Review.      ADE GRIFFITH DO   5/7/2020 4:22 PM    This document was prepared using voice generated softwear. While every attempt was made for accuracy, spelling and  grammatical errors may exist.

## 2020-05-08 ENCOUNTER — MYC MEDICAL ADVICE (OUTPATIENT)
Dept: INTERNAL MEDICINE | Facility: OTHER | Age: 50
End: 2020-05-08

## 2020-05-08 DIAGNOSIS — E06.3 HYPOTHYROIDISM DUE TO HASHIMOTO'S THYROIDITIS: ICD-10-CM

## 2020-05-11 RX ORDER — LEVOTHYROXINE SODIUM 112 UG/1
112 TABLET ORAL
Qty: 90 TABLET | Refills: 0 | Status: SHIPPED | OUTPATIENT
Start: 2020-05-11 | End: 2020-07-29

## 2020-05-12 ENCOUNTER — HOSPITAL ENCOUNTER (OUTPATIENT)
Dept: MRI IMAGING | Facility: OTHER | Age: 50
Discharge: HOME | End: 2020-05-12
Attending: INTERNAL MEDICINE | Admitting: INTERNAL MEDICINE
Payer: COMMERCIAL

## 2020-05-12 DIAGNOSIS — M51.369 DDD (DEGENERATIVE DISC DISEASE), LUMBAR: ICD-10-CM

## 2020-05-12 DIAGNOSIS — R20.0 LEFT LEG NUMBNESS: ICD-10-CM

## 2020-05-12 PROCEDURE — 72148 MRI LUMBAR SPINE W/O DYE: CPT

## 2020-05-13 ENCOUNTER — MYC MEDICAL ADVICE (OUTPATIENT)
Dept: INTERNAL MEDICINE | Facility: OTHER | Age: 50
End: 2020-05-13

## 2020-05-14 ENCOUNTER — MYC MEDICAL ADVICE (OUTPATIENT)
Dept: INTERNAL MEDICINE | Facility: OTHER | Age: 50
End: 2020-05-14

## 2020-05-14 DIAGNOSIS — M51.369 DDD (DEGENERATIVE DISC DISEASE), LUMBAR: Primary | ICD-10-CM

## 2020-05-14 NOTE — TELEPHONE ENCOUNTER
I talked with Dr. Crowder and she recommends [facility] - Dr. Vale or Dr. Ballard.    Let us know if you need a referral. Thanks.    Bety Crowder DO / JAHAIRA Boyce, AGNP-C

## 2020-06-05 ENCOUNTER — MYC MEDICAL ADVICE (OUTPATIENT)
Dept: INTERNAL MEDICINE | Facility: OTHER | Age: 50
End: 2020-06-05

## 2020-06-08 NOTE — TELEPHONE ENCOUNTER
[facility] was contacted and they stated that they had called and left two messages on patient cell phone.  Correct number was verified.  They never received a call back.  They have everything they need and patient can call and schedule at their convenience.    [facility] phone number is [phone].    Chanel Mcleod on 6/8/2020 at 2:30 PM

## 2020-07-02 ENCOUNTER — OFFICE VISIT (OUTPATIENT)
Dept: INTERNAL MEDICINE | Facility: OTHER | Age: 50
End: 2020-07-02
Attending: NURSE PRACTITIONER
Payer: COMMERCIAL

## 2020-07-02 VITALS
SYSTOLIC BLOOD PRESSURE: 112 MMHG | HEART RATE: 64 BPM | HEIGHT: 66 IN | WEIGHT: 179 LBS | DIASTOLIC BLOOD PRESSURE: 70 MMHG | BODY MASS INDEX: 28.77 KG/M2 | TEMPERATURE: 97.7 F | OXYGEN SATURATION: 96 %

## 2020-07-02 DIAGNOSIS — E06.3 HYPOTHYROIDISM DUE TO HASHIMOTO'S THYROIDITIS: ICD-10-CM

## 2020-07-02 DIAGNOSIS — Z01.818 PREOP GENERAL PHYSICAL EXAM: Primary | ICD-10-CM

## 2020-07-02 LAB
ALBUMIN SERPL-MCNC: 3.7 G/DL (ref 3.5–5.7)
ALP SERPL-CCNC: 48 U/L (ref 34–104)
ALT SERPL W P-5'-P-CCNC: 10 U/L (ref 7–52)
ANION GAP SERPL CALCULATED.3IONS-SCNC: 7 MMOL/L (ref 3–14)
AST SERPL W P-5'-P-CCNC: 13 U/L (ref 13–39)
BASOPHILS # BLD AUTO: 0 10E9/L (ref 0–0.2)
BASOPHILS NFR BLD AUTO: 0.8 %
BILIRUB SERPL-MCNC: 0.5 MG/DL (ref 0.3–1)
BUN SERPL-MCNC: 11 MG/DL (ref 7–25)
CALCIUM SERPL-MCNC: 8.9 MG/DL (ref 8.6–10.3)
CHLORIDE SERPL-SCNC: 104 MMOL/L (ref 98–107)
CO2 SERPL-SCNC: 25 MMOL/L (ref 21–31)
CREAT SERPL-MCNC: 0.78 MG/DL (ref 0.6–1.2)
DIFFERENTIAL METHOD BLD: NORMAL
EOSINOPHIL # BLD AUTO: 0.2 10E9/L (ref 0–0.7)
EOSINOPHIL NFR BLD AUTO: 3.9 %
ERYTHROCYTE [DISTWIDTH] IN BLOOD BY AUTOMATED COUNT: 12.9 % (ref 10–15)
GFR SERPL CREATININE-BSD FRML MDRD: 78 ML/MIN/{1.73_M2}
GLUCOSE SERPL-MCNC: 200 MG/DL (ref 70–105)
HBA1C MFR BLD: 6.9 % (ref 4–6)
HCT VFR BLD AUTO: 35.2 % (ref 35–47)
HGB BLD-MCNC: 12 G/DL (ref 11.7–15.7)
IMM GRANULOCYTES # BLD: 0 10E9/L (ref 0–0.4)
IMM GRANULOCYTES NFR BLD: 0.2 %
LYMPHOCYTES # BLD AUTO: 1.1 10E9/L (ref 0.8–5.3)
LYMPHOCYTES NFR BLD AUTO: 22.9 %
MCH RBC QN AUTO: 31 PG (ref 26.5–33)
MCHC RBC AUTO-ENTMCNC: 34.1 G/DL (ref 31.5–36.5)
MCV RBC AUTO: 91 FL (ref 78–100)
MONOCYTES # BLD AUTO: 0.3 10E9/L (ref 0–1.3)
MONOCYTES NFR BLD AUTO: 6.3 %
NEUTROPHILS # BLD AUTO: 3.2 10E9/L (ref 1.6–8.3)
NEUTROPHILS NFR BLD AUTO: 65.9 %
PLATELET # BLD AUTO: 280 10E9/L (ref 150–450)
POTASSIUM SERPL-SCNC: 3.8 MMOL/L (ref 3.5–5.1)
PROT SERPL-MCNC: 6.2 G/DL (ref 6.4–8.9)
PROTHROMBIN TIME: 11.7 S (ref 11.9–15.2)
RBC # BLD AUTO: 3.87 10E12/L (ref 3.8–5.2)
SODIUM SERPL-SCNC: 136 MMOL/L (ref 134–144)
TSH SERPL DL<=0.05 MIU/L-ACNC: 3.03 IU/ML (ref 0.34–5.6)
WBC # BLD AUTO: 4.9 10E9/L (ref 4–11)

## 2020-07-02 PROCEDURE — U0003 INFECTIOUS AGENT DETECTION BY NUCLEIC ACID (DNA OR RNA); SEVERE ACUTE RESPIRATORY SYNDROME CORONAVIRUS 2 (SARS-COV-2) (CORONAVIRUS DISEASE [COVID-19]), AMPLIFIED PROBE TECHNIQUE, MAKING USE OF HIGH THROUGHPUT TECHNOLOGIES AS DESCRIBED BY CMS-2020-01-R: HCPCS | Mod: ZL | Performed by: NURSE PRACTITIONER

## 2020-07-02 PROCEDURE — 99214 OFFICE O/P EST MOD 30 MIN: CPT | Performed by: NURSE PRACTITIONER

## 2020-07-02 PROCEDURE — 85025 COMPLETE CBC W/AUTO DIFF WBC: CPT | Mod: ZL | Performed by: NURSE PRACTITIONER

## 2020-07-02 PROCEDURE — 93000 ELECTROCARDIOGRAM COMPLETE: CPT | Performed by: INTERNAL MEDICINE

## 2020-07-02 PROCEDURE — 36415 COLL VENOUS BLD VENIPUNCTURE: CPT | Mod: ZL | Performed by: NURSE PRACTITIONER

## 2020-07-02 PROCEDURE — 80053 COMPREHEN METABOLIC PANEL: CPT | Mod: ZL | Performed by: NURSE PRACTITIONER

## 2020-07-02 PROCEDURE — 84443 ASSAY THYROID STIM HORMONE: CPT | Mod: ZL | Performed by: NURSE PRACTITIONER

## 2020-07-02 PROCEDURE — 00000401 ZZHCL STATISTIC THROMBIN TIME NC: Mod: ZL | Performed by: NURSE PRACTITIONER

## 2020-07-02 PROCEDURE — 85610 PROTHROMBIN TIME: CPT | Mod: ZL | Performed by: NURSE PRACTITIONER

## 2020-07-02 PROCEDURE — C9803 HOPD COVID-19 SPEC COLLECT: HCPCS

## 2020-07-02 PROCEDURE — 85732 THROMBOPLASTIN TIME PARTIAL: CPT | Mod: ZL | Performed by: NURSE PRACTITIONER

## 2020-07-02 PROCEDURE — 83036 HEMOGLOBIN GLYCOSYLATED A1C: CPT | Mod: ZL | Performed by: NURSE PRACTITIONER

## 2020-07-02 ASSESSMENT — MIFFLIN-ST. JEOR: SCORE: 1434.72

## 2020-07-02 ASSESSMENT — ENCOUNTER SYMPTOMS
BACK PAIN: 1
NUMBNESS: 1
MYALGIAS: 1
ARTHRALGIAS: 1

## 2020-07-02 ASSESSMENT — PAIN SCALES - GENERAL: PAINLEVEL: SEVERE PAIN (6)

## 2020-07-02 NOTE — PATIENT INSTRUCTIONS
INSULIN PUMP. Patient has Medtronic 670G (Hybrid Closed Loop) pump, would recommend:     DAY OF SURGERY  For BASAL:  Recommend using Manual Mode and setting new basal pattern for surgery, decreasing basal by half the amount from bedtime the night before until after surgery/recovery.  Then can change back to her usual basal pattern.     For BOLUS:  Just continue current settings, no changes as am assuming she will be NPO.     DURING HOSPITAL STAY:  She should be able to return to regular settings, AUTOMODE for basal and bolus.       Please note patient should upload to [email or web address] to check Manual mode basal units vs Auto mode basal units to be sure they match units.         - Patient has been advised to hold any aspirin therapy for seven days before surgery.   - Patient has been advised to avoid non-steroidal anti-inflammatory medications including Advil or ibuprofen, Aleve or naproxen, aspirin, fish oil, and turmeric for one week prior to surgery.   - Patient has been advised to hold vitamins and supplements for two weeks prior to surgery.  - Patient has been advised to take PROZAC AND LEVOTHYROXINE the morning of surgery with a small sip of water, otherwise will be NPO after midnight.  - Patient was advised to use Tylenol (acetaminophen) for pain control.  - Patient was advised that a summary of today's visit would be sent to surgeon prior to surgical date and a copy will be sent to patient via mail or [employer].  - Patient was advised to call our office and the surgical services with any change in condition or new symptoms that develop between today and your surgical date, in particular if you have any new chest pain, shortness of breath, swelling or fevers.

## 2020-07-02 NOTE — LETTER
July 3, 2020        Floresita Hill  [address] [facility] [address]    This letter provides a written record that you were tested for COVID-19 on 7/2/20.       Your result was negative. This means that we didn t find the virus that causes COVID-19 in your sample. A test may show negative when you do actually have the virus. This can happen when the virus is in the early stages of infection, before you feel illness symptoms.    If you have symptoms   Stay home and away from others (self-isolate) until you meet ALL of the guidelines below:    You ve had no fever--and no medicine that reduces fever--for 3 full days (72 hours). And      Your other symptoms have gotten better. For example, your cough or breathing has improved. And     At least 10 days have passed since your symptoms started.    During this time:    Stay home. Don t go to work, school or anywhere else.     Stay in your own room, including for meals. Use your own bathroom if you can.    Stay away from others in your home. No hugging, kissing or shaking hands. No visitors.    Clean  high touch  surfaces often (doorknobs, counters, handles, etc.). Use a household cleaning spray or wipes. You can find a full list on the EPA website at www.epa.gov/pesticide-registration/list-n-disinfectants-use-against-sars-cov-2.    Cover your mouth and nose with a mask, tissue or washcloth to avoid spreading germs.    Wash your hands and face often with soap and water.    Going back to work  Check with your employer for any guidelines to follow for going back to work.    Employers: This document serves as formal notice that your employee tested negative for COVID-19, as of the testing date shown above.

## 2020-07-02 NOTE — NURSING NOTE
"Chief Complaint   Patient presents with     Pre-Op Exam     Date of Surgery: 7/7/2020  Type of Surgery: Lumbar laminectomy   Surgeon: Dr. Vale  Hospital:  [facility]   Fax: [phone]    Fever/Chills or other infectious symptoms in pastmonth: No  >10lb weight loss in past two months: No    Health Care Directive/Code status:  No  Hx of blood transfusions:   No   Td up to date:  yes  History of VRE/MRSA:  NO Date:     Preoperative Evaluation: Obstructive Sleep Apnea screening    S:Snore -  Do you snore loudly? (louder than talking or loud enough to be heard through closed doors)No  T: Tired - Do you often feel tired, fatigued, or sleepy during the daytime?No  O: Observed - Has anyone ever observed you stop breathing during your sleep?No  P: Pressure - Do you have or are you being treated for high blood pressure?No  B: BMI - BMI greater than 35kg/m2?No  A: Age - Age over 50 years old?Yes  N: Neck - Neck circumference greater than 40 cm?No  G: Gender - Gender: Male?No    Total number of \"YES\" responses:  1    Scoring: Low risk of SHAHID 0-2  At Risk of SHAHID: >3 High Risk of SHAHID: 5-8    Angelica Forman LPN, DURGA........................7/2/2020  8:11 AM           Initial /70 (BP Location: Right arm, Patient Position: Sitting, Cuff Size: Adult Regular)   Pulse 64   Temp 97.7  F (36.5  C) (Tympanic)   Ht 1.67 m (5' 5.75\")   Wt 81.2 kg (179 lb)   SpO2 96%   BMI 29.11 kg/m   Estimated body mass index is 29.11 kg/m  as calculated from the following:    Height as of this encounter: 1.67 m (5' 5.75\").    Weight as of this encounter: 81.2 kg (179 lb).  Medication Reconciliation: complete    Angelica Forman LPN  "

## 2020-07-02 NOTE — PROGRESS NOTES
"Nursing Notes:   Angelica Forman LPN  7/2/2020  8:13 AM  Signed  Chief Complaint   Patient presents with     Pre-Op Exam     Date of Surgery: 7/7/2020  Type of Surgery: Lumbar laminectomy   Surgeon: Dr. Vale  Hospital:  [facility]   Fax: [phone]    Fever/Chills or other infectious symptoms in pastmonth: No  >10lb weight loss in past two months: No    Health Care Directive/Code status:  No  Hx of blood transfusions:   No   Td up to date:  yes  History of VRE/MRSA:  NO Date:     Preoperative Evaluation: Obstructive Sleep Apnea screening    S:Snore -  Do you snore loudly? (louder than talking or loud enough to be heard through closed doors)No  T: Tired - Do you often feel tired, fatigued, or sleepy during the daytime?No  O: Observed - Has anyone ever observed you stop breathing during your sleep?No  P: Pressure - Do you have or are you being treated for high blood pressure?No  B: BMI - BMI greater than 35kg/m2?No  A: Age - Age over 50 years old?Yes  N: Neck - Neck circumference greater than 40 cm?No  G: Gender - Gender: Male?No    Total number of \"YES\" responses:  1    Scoring: Low risk of SHAHID 0-2  At Risk of SHAHID: >3 High Risk of SHAHID: 5-8    Angelica Forman LPN, LPN........................7/2/2020  8:11 AM           Initial /70 (BP Location: Right arm, Patient Position: Sitting, Cuff Size: Adult Regular)   Pulse 64   Temp 97.7  F (36.5  C) (Tympanic)   Ht 1.67 m (5' 5.75\")   Wt 81.2 kg (179 lb)   SpO2 96%   BMI 29.11 kg/m   Estimated body mass index is 29.11 kg/m  as calculated from the following:    Height as of this encounter: 1.67 m (5' 5.75\").    Weight as of this encounter: 81.2 kg (179 lb).  Medication Reconciliation: complete    Angelica Forman LPN     Nursing note reviewed with patient.  Accuracy and completeness verified.     ----------------- PREOPERATIVE EXAM ------------------    SUBJECTIVE:                                                     HPI:    Floresita Hill is a " "50 year old female patient who presents to the clinic today for a pre-operative evaluation for lumbar laminectomy/discectomy on 7/7/2020 by Dr. Hill at [facility].    PRE OPERATIVE SCREENING QUESTIONS:    NO - Any fever/chills or other infectious symptoms in past month?   NO - Has there been a >10 lbs weight loss in the past 2 months?   NO - Prolonged steroid use in past year?   NO - Recent use of: aspirin has been stopped  NO - Health Care Directive on file?   NO - History of blood transfusions?   NO - Td up to date?   NO - History of heart attack?   NO - History of stress test completed in past 5 years?   NO - History of irregular heart rhythm?   NO - History of VRE/MRSA?   NO - History of prosthetic heart valves or joints?   NO - History of blood clots, bleeding issues or anesthetic problems in patient?    NO - History of blood clots, bleeding issues or anesthetic problems in immediate family members?    NO - History of stent placement?   NO - History of uncontrolled GERD?   NO - History of smoking?   NO - Alcohol intake?       REVIEW OF SYSTEMS:    Review of Systems   Musculoskeletal: Positive for arthralgias, back pain, gait problem (due to pain) and myalgias.   Neurological: Positive for numbness (left sided tingling, numbness).   Psychiatric/Behavioral:        Reports she \"can't remember words\"   All other systems reviewed and are negative.    History is discussed on 7/2/2020 with patient and reviewed in available records by myself.  It is current to the best of my knowledge as below.    Past Medical History:   Diagnosis Date     Dysthymic disorder      Hashimoto's thyroiditis 3/29/2015     Iron deficiency anemia 11/16/2012     Type 1 diabetes mellitus without complications (H)     Dx at 10 years old,Uses Medtronic Insulin pump.       Past Surgical History:   Procedure Laterality Date     ARTHROTOMY TEMPOROMANDIBULAR JOINT  1985     BREAST BIOPSY, RT/LT Left 04/12/2019    benign     COLONOSCOPY  05/24/2019    " normal results, follow up 10 years     COLONOSCOPY N/A 5/24/2019    Procedure: COLONOSCOPY;  Surgeon: Maria Victoria Dubose MD;  Location: [facility] OR     DILATION AND CURETTAGE      x3; benign     Social History     Socioeconomic History     Marital status:      Spouse name: Not on file     Number of children: Not on file     Years of education: Not on file     Highest education level: Not on file   Occupational History     Not on file   Social Needs     Financial resource strain: Not on file     Food insecurity     Worry: Not on file     Inability: Not on file     Transportation needs     Medical: Not on file     Non-medical: Not on file   Tobacco Use     Smoking status: Never Smoker     Smokeless tobacco: Never Used   Substance and Sexual Activity     Alcohol use: Yes     Alcohol/week: 2.0 standard drinks     Comment: Alcoholic Drinks/day: 3 days weekly     Drug use: No     Sexual activity: Not Currently     Partners: Male   Lifestyle     Physical activity     Days per week: Not on file     Minutes per session: Not on file     Stress: Not on file   Relationships     Social connections     Talks on phone: Not on file     Gets together: Not on file     Attends Latter-day service: Not on file     Active member of club or organization: Not on file     Attends meetings of clubs or organizations: Not on file     Relationship status: Not on file     Intimate partner violence     Fear of current or ex partner: Not on file     Emotionally abused: Not on file     Physically abused: Not on file     Forced sexual activity: Not on file   Other Topics Concern     Parent/sibling w/ CABG, MI or angioplasty before 65F 55M? Not Asked   Social History Narrative    , moved here from [place].   Sense of humor intact.   works at [employer] as  at [employer].  Eugene - .  2 anthony, Rebecca (1999), Olga (2002)    Works in HR at [employer].         Current Outpatient Medications   Medication Sig Dispense Refill     aspirin EC  "81 MG EC tablet Take 81 mg by mouth daily with food       blood glucose (NO BRAND SPECIFIED) test strip Use to test blood sugars 5 times daily with pump in Vitro 90 days 450 strip 11     Blood Glucose Monitoring Suppl ([employer] BLOOD GLUCOSE MONITOR) W/DEVICE KIT Dispense glucose meter, test strips and lancets covered by the patient insurance. Test 5 times per day. 3 month supply with 4 refills.       FLUoxetine (PROZAC) 10 MG capsule Take 3 capsules (30 mg) by mouth every morning 180 capsule 3     insulin aspart (NOVOLOG VIAL) 100 UNITS/ML vial Per pump Medtronic subcutaneous administration 40 mL 4     insulin glargine (LANTUS) 100 UNIT/ML injection Inject 20 Units Subcutaneous At Bedtime Before bedtime 3 vial 3     insulin syringe-needle U-100 (B-D INSULIN SYRINGE ULTRAFINE) 29G X 1/2\" 0.5 ML For administering insulin at home.       levothyroxine (SYNTHROID/LEVOTHROID) 112 MCG tablet Take 1 tablet (112 mcg) by mouth every morning (before breakfast) Increase in dose 90 tablet 0     triamcinolone (KENALOG) 0.1 % cream          Allergies   Allergen Reactions     Codeine Unknown     Loses consciousness     Hmg-Coa-R Inhibitors Muscle Pain (Myalgia)     Mild achiness, may try again in future       Family Status   Relation Name Status     Fa   at age 53         in motorcycle accident     Mo  Alive     Sis Fidel Alive     MAunt  (Not Specified)     Araceli Rebecca Alive     Araceli [place] Alive       Family History   Problem Relation Age of Onset     Kidney Disease Mother         s/p transplant      Myasthenia gravis Mother      Lung Cancer Mother      Hypothyroidism Sister      Bipolar Disorder Sister      Migraines Sister      Breast Cancer Maternal Aunt         Cancer-breast     Hearing Loss Daughter         bilateral     Other - See Comments Daughter         asd spectrum     Albinism Daughter      Anxiety Disorder Daughter      No Known Problems Daughter        CODE STATUS:  No Order patient was given healthcare " "directive to review, complete and return to clinic.    OBJECTIVE:                                                     /70 (BP Location: Right arm, Patient Position: Sitting, Cuff Size: Adult Regular)   Pulse 64   Temp 97.7  F (36.5  C) (Tympanic)   Ht 1.67 m (5' 5.75\")   Wt 81.2 kg (179 lb)   SpO2 96%   BMI 29.11 kg/m      Estimated body mass index is 29.11 kg/m  as calculated from the following:    Height as of this encounter: 1.67 m (5' 5.75\").    Weight as of this encounter: 81.2 kg (179 lb).     Physical Exam  Vitals signs reviewed.   Constitutional:       Appearance: Normal appearance.   HENT:      Head: Normocephalic and atraumatic.   Cardiovascular:      Rate and Rhythm: Normal rate and regular rhythm.      Heart sounds: Normal heart sounds.   Pulmonary:      Effort: Pulmonary effort is normal.      Breath sounds: Normal breath sounds.   Abdominal:      General: Bowel sounds are normal.      Palpations: Abdomen is soft.   Musculoskeletal: Normal range of motion.         General: Tenderness present.   Skin:     General: Skin is warm and dry.   Neurological:      Mental Status: She is alert and oriented to person, place, and time. Mental status is at baseline.   Psychiatric:         Mood and Affect: Mood normal.         Behavior: Behavior normal.         Thought Content: Thought content normal.         Judgment: Judgment normal.       ASSESSMENT:                                                     CXR:  Not indicated.    EK2020 - Personally ordered and reviewed. EKG indicated Normal Sinus Rhythm.    LABS: Personally ordered and reviewed as below:    Results for orders placed or performed in visit on 20   Hemoglobin A1c     Status: Abnormal   Result Value Ref Range    Hemoglobin A1C 6.9 (H) 4.0 - 6.0 %   Protime-INR     Status: Abnormal   Result Value Ref Range    Protime 11.7 (L) 11.9 - 15.2 s   Comprehensive metabolic panel     Status: Abnormal   Result Value Ref Range    Sodium 136 " 134 - 144 mmol/L    Potassium 3.8 3.5 - 5.1 mmol/L    Chloride 104 98 - 107 mmol/L    Carbon Dioxide 25 21 - 31 mmol/L    Anion Gap 7 3 - 14 mmol/L    Glucose 200 (H) 70 - 105 mg/dL    Urea Nitrogen 11 7 - 25 mg/dL    Creatinine 0.78 0.60 - 1.20 mg/dL    GFR Estimate 78 >60 mL/min/[1.73_m2]    GFR Estimate If Black >90 >60 mL/min/[1.73_m2]    Calcium 8.9 8.6 - 10.3 mg/dL    Bilirubin Total 0.5 0.3 - 1.0 mg/dL    Albumin 3.7 3.5 - 5.7 g/dL    Protein Total 6.2 (L) 6.4 - 8.9 g/dL    Alkaline Phosphatase 48 34 - 104 U/L    ALT 10 7 - 52 U/L    AST 13 13 - 39 U/L   CBC with platelets differential     Status: None   Result Value Ref Range    WBC 4.9 4.0 - 11.0 10e9/L    RBC Count 3.87 3.8 - 5.2 10e12/L    Hemoglobin 12.0 11.7 - 15.7 g/dL    Hematocrit 35.2 35.0 - 47.0 %    MCV 91 78 - 100 fl    MCH 31.0 26.5 - 33.0 pg    MCHC 34.1 31.5 - 36.5 g/dL    RDW 12.9 10.0 - 15.0 %    Platelet Count 280 150 - 450 10e9/L    Diff Method Automated Method     % Neutrophils 65.9 %    % Lymphocytes 22.9 %    % Monocytes 6.3 %    % Eosinophils 3.9 %    % Basophils 0.8 %    % Immature Granulocytes 0.2 %    Absolute Neutrophil 3.2 1.6 - 8.3 10e9/L    Absolute Lymphocytes 1.1 0.8 - 5.3 10e9/L    Absolute Monocytes 0.3 0.0 - 1.3 10e9/L    Absolute Eosinophils 0.2 0.0 - 0.7 10e9/L    Absolute Basophils 0.0 0.0 - 0.2 10e9/L    Abs Immature Granulocytes 0.0 0 - 0.4 10e9/L   TSH Reflex [facility]     Status: None   Result Value Ref Range    TSH Reflex 3.03 0.34 - 5.60 IU/mL       PRE OPERATIVE EXAMINATION ASSESSMENT:    - Patient is able to tolerate greater than 4 MET's of activity without any cardiopulmonary symptoms.   - The patient has Revised Cardiac Risk Index (RCRI) risk factors of diabetes type 1, iron deficiency anemia  - ASA Physical Status classification is class II  - Patient's perioperative risk is minimized and no further cardiopulmonary workup is necessary.  Please contact the office with any questions or concerns.    [facility]  Guidelines     Revised Cardiac Risk Index    The proposed surgical procedure is considered  moderate risk.     REVISED CARDIAC RISK INDEX  The patient has the following serious cardiovascular risks for perioperative complications such as (MI, PE, VFib and 3  AV Block):  Type 1 diabetes and iron deficiency anemia  INTERPRETATION:   Class ii, moderate risk     The patient has the following additional risks for perioperative complications:  Type 1 diabetes and iron deficiency anemia    RECOMMENDATIONS:      PRE OP RECOMMENDATIONS:    - Patient is on chronic pain medications NO.  - Patient is on anti-platelet/anti-coagulation YES, ASPIRIN.  - Other medications that need adjustment perioperatively YES.   INSULIN PUMP. patient has Medtronic 670G (Hybrid Closed Loop) pump, it is recommended:     DAY OF SURGERY  For BASAL:  Recommend using Manual Mode and setting new basal pattern for surgery, decreasing basal by half the amount from bedtime the night before until after surgery/recovery.  Then can change back to her usual basal pattern.   For BOLUS:  Just continue current settings, no changes as am assuming she will be NPO.     DURING HOSPITAL STAY:  She should be able to return to regular settings, AUTOMODE for basal and bolus.     Please note patient should upload to [employer] to check Manual mode basal units vs Auto mode basal units to be sure they match units.       - Patient has been advised to hold any aspirin therapy for seven days before surgery.   - Patient has been advised to avoid non-steroidal anti-inflammatory medications including Advil or ibuprofen, Aleve or naproxen, aspirin, fish oil, and turmeric for one week prior to surgery.   - Patient has been advised to hold vitamins and supplements for two weeks prior to surgery.  - Patient has been advised to take PROZAC AND LEVOTHYROXINE the morning of surgery with a small sip of water, otherwise will be NPO after midnight.  - Patient was advised to use Tylenol  (acetaminophen) for pain control.  - Patient was advised that a summary of today's visit would be sent to surgeon prior to surgical date and a copy will be sent to patient via mail or [email or web address].  - Patient was advised to call our office and the surgical services with any change in condition or new symptoms that develop between today and your surgical date, in particular if you have any new chest pain, shortness of breath, swelling or fevers.     Patient verbalizes understanding and is agreeable to plan of care.    APPROVAL GIVEN to proceed with proposed procedure, without further diagnostic evaluation     Emmanuelle Paz NP  Internal Medicine  [facility]  07/02/2020  11:25 AM     Signed Electronically by: Emmanuelle Paz NP     Copy of this evaluation report is provided to requesting physician.     Portions of this note were dictated using speech recognition software. The note has been proofread but errors in the text may have been overlooked. Please contact me if there are any concerns regarding the accuracy of the dictation.

## 2020-07-03 LAB
APTT IMM NP PPP: 37 SEC (ref 31–45)
APTT IMM NP PPP: NORMAL SEC (ref 31–45)
SARS-COV-2 RNA SPEC QL NAA+PROBE: NOT DETECTED
SPECIMEN SOURCE: NORMAL

## 2020-07-06 ENCOUNTER — HOSPITAL ENCOUNTER (OUTPATIENT)
Dept: MAMMOGRAPHY | Facility: OTHER | Age: 50
Discharge: HOME | End: 2020-07-06
Attending: INTERNAL MEDICINE | Admitting: INTERNAL MEDICINE
Payer: COMMERCIAL

## 2020-07-06 DIAGNOSIS — R92.8 ABNORMAL FINDING ON BREAST IMAGING: ICD-10-CM

## 2020-07-06 DIAGNOSIS — Z09 FOLLOW-UP EXAM, 3-6 MONTHS SINCE PREVIOUS EXAM: ICD-10-CM

## 2020-07-06 LAB — INTERPRETATION ECG - MUSE: NORMAL

## 2020-07-06 PROCEDURE — G0279 TOMOSYNTHESIS, MAMMO: HCPCS

## 2020-07-27 DIAGNOSIS — E06.3 HYPOTHYROIDISM DUE TO HASHIMOTO'S THYROIDITIS: ICD-10-CM

## 2020-07-29 RX ORDER — LEVOTHYROXINE SODIUM 112 UG/1
112 TABLET ORAL
Qty: 90 TABLET | Refills: 3 | Status: SHIPPED | OUTPATIENT
Start: 2020-07-29 | End: 2021-06-25

## 2020-07-29 NOTE — TELEPHONE ENCOUNTER
[facility] sent Rx request for the following:   levothyroxine (SYNTHROID/LEVOTHROID) 112 MCG tablet  Sig: Take 1 tablet (112 mcg) by mouth every morning (before breakfast) Increase in dose - Oral     Last Prescription Date:   05/11/2020  Last Fill Qty/Refills:         90, R-0    Last Office Visit:              07/02/2020   Future Office visit:           none        Prescription refilled per RN Medication Refill Policy.................... Genesis Osorio RN ....................  7/29/2020   3:54 PM

## 2020-09-18 DIAGNOSIS — E10.9 TYPE 1 DIABETES MELLITUS WITHOUT COMPLICATION (H): ICD-10-CM

## 2020-09-18 NOTE — TELEPHONE ENCOUNTER
[facility] sent Rx request for the following: CONTOUR NEXT TEST test strip  Sig Use to test blood sugars 5 times daily with pump    Last Prescription Date:   4/10/19  Last Fill Qty/Refills:         450, R-11    Last Office Visit:              5/7/20   Future Office visit:           None    Prescription refilled per RN Medication Refill Policy.................... Camila Milton RN ....................  9/18/2020   9:52 AM

## 2020-11-10 ENCOUNTER — MYC MEDICAL ADVICE (OUTPATIENT)
Dept: INTERNAL MEDICINE | Facility: OTHER | Age: 50
End: 2020-11-10

## 2020-11-10 DIAGNOSIS — N95.1 MENOPAUSAL SYNDROME (HOT FLASHES): Primary | ICD-10-CM

## 2020-11-10 DIAGNOSIS — R23.2 HOT FLASHES: ICD-10-CM

## 2020-12-03 DIAGNOSIS — R23.2 HOT FLASHES: ICD-10-CM

## 2020-12-03 LAB
FSH SERPL-ACNC: 118.7 IU/L
TSH SERPL DL<=0.05 MIU/L-ACNC: 0.47 IU/ML (ref 0.34–5.6)

## 2020-12-03 PROCEDURE — 36415 COLL VENOUS BLD VENIPUNCTURE: CPT | Mod: ZL | Performed by: INTERNAL MEDICINE

## 2020-12-03 PROCEDURE — 84443 ASSAY THYROID STIM HORMONE: CPT | Mod: ZL | Performed by: INTERNAL MEDICINE

## 2020-12-03 PROCEDURE — 83001 ASSAY OF GONADOTROPIN (FSH): CPT | Mod: ZL | Performed by: INTERNAL MEDICINE

## 2021-01-07 ENCOUNTER — OFFICE VISIT (OUTPATIENT)
Dept: INTERNAL MEDICINE | Facility: OTHER | Age: 51
End: 2021-01-07
Attending: INTERNAL MEDICINE
Payer: COMMERCIAL

## 2021-01-07 VITALS
OXYGEN SATURATION: 99 % | TEMPERATURE: 96.3 F | HEART RATE: 62 BPM | BODY MASS INDEX: 28.75 KG/M2 | DIASTOLIC BLOOD PRESSURE: 80 MMHG | RESPIRATION RATE: 16 BRPM | SYSTOLIC BLOOD PRESSURE: 136 MMHG | WEIGHT: 176.8 LBS

## 2021-01-07 DIAGNOSIS — R20.2 NUMBNESS AND TINGLING OF LEFT LEG: ICD-10-CM

## 2021-01-07 DIAGNOSIS — E10.9 TYPE 1 DIABETES MELLITUS WITHOUT COMPLICATION (H): Primary | ICD-10-CM

## 2021-01-07 DIAGNOSIS — R20.0 NUMBNESS AND TINGLING OF LEFT LEG: ICD-10-CM

## 2021-01-07 DIAGNOSIS — M79.662 PAIN IN BOTH LOWER LEGS: ICD-10-CM

## 2021-01-07 DIAGNOSIS — R23.2 HOT FLASHES: ICD-10-CM

## 2021-01-07 DIAGNOSIS — M79.661 PAIN IN BOTH LOWER LEGS: ICD-10-CM

## 2021-01-07 DIAGNOSIS — Z11.59 ENCOUNTER FOR HEPATITIS C SCREENING TEST FOR LOW RISK PATIENT: ICD-10-CM

## 2021-01-07 LAB
HBA1C MFR BLD: 7 % (ref 4–6)
TSH SERPL DL<=0.05 MIU/L-ACNC: 1.56 IU/ML (ref 0.34–5.6)

## 2021-01-07 PROCEDURE — 83036 HEMOGLOBIN GLYCOSYLATED A1C: CPT | Mod: ZL | Performed by: INTERNAL MEDICINE

## 2021-01-07 PROCEDURE — 36415 COLL VENOUS BLD VENIPUNCTURE: CPT | Mod: ZL | Performed by: INTERNAL MEDICINE

## 2021-01-07 PROCEDURE — 84443 ASSAY THYROID STIM HORMONE: CPT | Mod: ZL | Performed by: INTERNAL MEDICINE

## 2021-01-07 PROCEDURE — 86803 HEPATITIS C AB TEST: CPT | Mod: ZL | Performed by: INTERNAL MEDICINE

## 2021-01-07 PROCEDURE — 99214 OFFICE O/P EST MOD 30 MIN: CPT | Performed by: INTERNAL MEDICINE

## 2021-01-07 NOTE — PROGRESS NOTES
Chief Complaint   Patient presents with     Back Pain     Menopausal Sx     hot flashes          Subjective:   Ms. Casey Hill is a 50 year old female  seen for the acute concern today of increased back pain and continued hot flashes.    She underwent back surgery in 07/2020 and felt great after.  She however started having pain in the posterior thigh in Sept.  She has been walking 15K steps daily.  Now she is having some numbness in her left plantar aspect of her foot in the past month.  She is still walking but slightly less distance.  She tried resting, massage, roller balls, ibuprofen with no improvement.   She describes her pain as an ache.  She does not feel it in her back.      She has continued to have some hot flashes.  She feels these are intensified and wax and wane.  Her LMP was 8 months ago.  She has been having some nausea and feeling worse than she used to with her hot flashes when they occur.    Blood sugars have been consistent.  She has not had lows or significant highs.  No issues with pump.  She is due for microalbumin, A1c.    She is due for hepatitis C screening.    She  reports that she has never smoked. She has never used smokeless tobacco.    Past medical history reviewed as below:     Past Medical History:   Diagnosis Date     Dysthymic disorder      Hashimoto's thyroiditis 3/29/2015     Iron deficiency anemia 11/16/2012     Type 1 diabetes mellitus without complications (H)     Dx at 10 years old,Uses Medtronic Insulin pump.   .      ROS:   Pertinent  ROS was performed and was negative, including for fevers, chills, chest pain, shortness of breath, increased lower extremity edema, changes in bowel or bladder, blood in the stool. No other concerns, with exception of HPI above.      Objective:    /80 (BP Location: Right arm, Patient Position: Sitting, Cuff Size: Adult Regular)   Pulse 62   Temp 96.3  F (35.7  C) (Tympanic)   Resp 16   Wt 80.2 kg (176 lb 12.8 oz)   LMP  05/07/2020 (Approximate)   SpO2 99%   BMI 28.75 kg/m    GEN: Vitals reviewed.  Patient is in no acute distress. Cooperative with exam.  HEENT: Normocephalic atraumatic.  Eyes grossly normal to inspection.  No discharge or erythema, or obvious scleral/conjunctival abnormalities.    CV: Heart regular in rate and rhythm with no murmur.   LUNGS: No audible wheeze, cough, or visible cyanosis.  No visible retractions or increased work of breathing.  Lungs clear to auscultation bilaterally.    SKIN: Warm and dry to touch.  Visible skin clear. No significant rash, abnormal pigmentation or lesions.  EXT: No clubbing or cyanosis.  No peripheral edema.  MSK: Gait is antalgic  NEURO: Cranial nerves grossly intact.  Mentation and speech appropriate for age.  PSYCH: Mentation appears normal, affect normal/bright, judgement and insight intact, normal speech and appearance well-groomed.       Assessment/Plan:   Type 1 diabetes mellitus without complication (H)  A1c remains controlled.  Continue on current settings.  - Albumin Random Urine Quantitative with Creat Ratio  - Hemoglobin A1c    Encounter for hepatitis C screening test for low risk patient  - Hepatitis C antibody    Pain in both lower legs  Exact etiology is unknown.  She was offered imaging, therapy referral and/or a Medrol Dosepak today.  At this time she would like to try physical therapy first.  Additionally we talked about avoiding strenuous walking including in the snow and up and down hills.  She is encouraged to use orthotics for her boots.  She is to call with any worsening symptoms.  If she does not have improvement in the next couple weeks it would be recommended to consider a Medrol Dosepak and possibly imaging at that time.  - PHYSICAL THERAPY REFERRAL    Numbness and tingling of left leg  See above.  Call with any worsening symptoms.  - PHYSICAL THERAPY REFERRAL    Hot flashes  -We will recheck thyroid at this time given the change in dose this year.  We  discussed options including hormonal therapy although I would be hesitant with this given her family history of breast cancer.  We also discussed SSRIs, gabapentin, over-the-counter medications.  At this time she would like to hold on any additional medications.  - TSH Reflex [facility]      - Return/call as needed for follow-up should any new symptoms develop, for worsening of current symptoms or if symptoms do not resolve with above plan.    Return in about 6 months (around 7/7/2021) for Annual Review.     ADE GRIFFITH,    1/7/2021 3:21 PM    This document was prepared using voice generated softwear. While every attempt was made for accuracy, grammatical errors may exist.

## 2021-01-07 NOTE — NURSING NOTE
Patient presents to clinic today for follow up on her back after surgery and is now having some pain in the back of her legs. Also, has had terrible hot flashes for some time now and is not sleeping well due to this.    Medication reconciliation completed.  Alycia Birmingham CMA([facility])..................1/7/2021   3:11 PM

## 2021-01-07 NOTE — PATIENT INSTRUCTIONS
some insoles    Ice every 3-4 hours, gentle exercise/rest as much as possible.     I recommend Powerstep Protech Control orthotics which can be bought online and at some stores ([employer]).  If you have interest in custom orthotics, please let me know and I can place a referral.     Try PT, decrease walking daily and avoid hills and deep snow.    Let me know in about 2 weeks how things are going.    - Return/call as needed for follow-up should any new symptoms develop, for worsening of current symptoms or if symptoms do not resolve with above plan.     Patient Education     Coping with Symptoms of Menopause  What symptoms of menopause may occur with my treatment?  Chemotherapy drugs or medicines that block hormones may bring on menopause.  These changes may include:    Hot flashes    Vaginal dryness    Trouble falling asleep (insomnia)    Headache    Depression.  How are hot flashes treated?  Your doctor may suggest medicine to control the hot flashes. Vitamins E, B6 or C may also help. Talk to your doctor about how much to take.  Some herbs or foods may help: primrose oil, garlic, chamomile, ginseng root, royal jelly or soy.  What else can I do to cope with hot flashes?    Wear clothes made of natural fabric such as cotton.    Dress in layers. You can remove them when you feel too warm.    Avoid hot drinks (coffee or tea) and spicy foods.    Keep the room temperature low if you can.    Control your stress. Exercise and relaxation techniques may help.    Keep track of when and how often hot flashes occur. Note what is happening right before a hot flash. This may give you some control over future hot flashes.    How is vaginal dryness treated?    You can try drugstore products such as Replens, Gyne-Moistrin or Lubrin. They last for about three days.    Use water-based lubricants during sex. These include Astroglide and K-Y Jelly. Do not use Vaseline or petroleum jelly because they are not good for vaginal  tissues.    Use low-dose estrogen cream in the vagina. Your doctor must prescribe this medicine.    How can I cope if I have trouble sleeping?    Get in bed when you are ready to go to sleep. Do not watch TV or read in bed.    Follow a sleeping routine: Go to bed and get up at the same times. Get up on time even if you did not sleep well.    If you do not fall asleep within 30 minutes, get up.    Get regular exercise. Do not exercise right before bedtime.    Avoid alcohol. It may disrupt your sleep.    Try natural remedies just before bedtime such as warm milk, chamomile tea or verbena tea.    How can I treat headache?  Ask your doctor if it is safe to take aspirin, Tylenol (acetaminophen) or Advil (ibuprofen). They may cause side effects when mixed with chemotherapy.  How can I cope with depression?  Mild depression    Start an exercise program. Exercise with a friend. Any activity is better than none. Walk to the mailbox, to see your neighbors or in the mall.    Share your feelings with family and friends.    Don't give in to negative feelings.  Severe depression  If your depression lasts longer than two weeks, talk to your care team. They may suggest counseling or medicine.  Comments:  __________________________________________  __________________________________________  __________________________________________  __________________________________________  __________________________________________  __________________________________________  __________________________________________  __________________________________________  For informational purposes only. Not to replace the advice of your health care provider.  Copyright   2010 [employer]. All rights reserved. [employer] 366584 - 12/15.  For informational purposes only. Not to replace the advice of your health care provider.  Copyright   2018 [employer]. All rights reserved.

## 2021-01-08 LAB — HCV AB SERPL QL IA: NONREACTIVE

## 2021-01-14 ENCOUNTER — TELEPHONE (OUTPATIENT)
Dept: INTERNAL MEDICINE | Facility: OTHER | Age: 51
End: 2021-01-14

## 2021-01-14 NOTE — TELEPHONE ENCOUNTER
They received the office notes from 1/7/21 but the note didn't address her diabetes in order for that visit to satisfy medical necessity for an insulin pump. They will fax a form to us to fill out.  Alycia Birmingham CMA([facility])..................1/14/2021   10:07 AM

## 2021-01-20 ENCOUNTER — HOSPITAL ENCOUNTER (OUTPATIENT)
Dept: PHYSICAL THERAPY | Facility: OTHER | Age: 51
Setting detail: THERAPIES SERIES
End: 2021-01-20
Attending: INTERNAL MEDICINE
Payer: COMMERCIAL

## 2021-01-20 DIAGNOSIS — M79.661 PAIN IN BOTH LOWER LEGS: ICD-10-CM

## 2021-01-20 DIAGNOSIS — M79.662 PAIN IN BOTH LOWER LEGS: ICD-10-CM

## 2021-01-20 DIAGNOSIS — R20.0 NUMBNESS AND TINGLING OF LEFT LEG: ICD-10-CM

## 2021-01-20 DIAGNOSIS — R20.2 NUMBNESS AND TINGLING OF LEFT LEG: ICD-10-CM

## 2021-01-20 PROCEDURE — 97161 PT EVAL LOW COMPLEX 20 MIN: CPT | Mod: GP

## 2021-01-20 PROCEDURE — 97110 THERAPEUTIC EXERCISES: CPT | Mod: GP

## 2021-01-20 NOTE — PROGRESS NOTES
01/20/21 0800   General Information   Type of Visit Initial OP Ortho PT Evaluation   Start of Care Date 01/20/21   Referring Physician Dr. Crowder    Patient/Family Goals Statement decrease pain and resime prior level of activity    Orders Evaluate and Treat   Date of Order 01/07/21   Certification Required? No   Medical Diagnosis Numbness and tingling of left leg R20.0, R20.2, Pain in both lower legs M79.661, M79.662     Surgical/Medical history reviewed Yes   Body Part(s)   Body Part(s) Lumbar Spine/SI   Presentation and Etiology   Pertinent history of current problem (include personal factors and/or comorbidities that impact the POC) Patient presents today with low back and left LE radicular symptoms. She underwent L4-L5 decompression surgery with Dr. Vale on 7/7/2020.  She recovered very well from surgery and was able to work up to walking 15,00 step a day. In September she began to notice some increased left LE symptoms.  Symptoms remained unchanged and she went to Dr. Crowder who recommended to decrease the number of steps per day, and only walk on flat, level surefaces. Patient reports these changes have helped decrease pain significantly. She currently experiences mild left LE symptoms at times. She is currently walking sbout 7,000 steps per day.    Impairments A. Pain;F. Decreased strength and endurance;E. Decreased flexibility   Functional Limitations perform desired leisure / sports activities   Symptom Location low back and left LE    Chronicity New   Pain quality C. Aching   Pain/symptoms exacerbated by B. Walking  (wakes in the morning with increased symptoms )   Pain/symptoms eased by B. Walking;E. Changing positions   Prior Level of Function   Prior Level of Function-Mobility no limitations    Prior Level of Function-ADLs no limitations   Current Level of Function   Current Community Support Family/friend caregiver   Patient role/employment history A. Employed   Fall Risk Screen   Fall screen  completed by PT   Have you fallen 2 or more times in the past year? No   Have you fallen and had an injury in the past year? No   Is patient a fall risk? No   Lumbar Spine/SI Objective Findings   Observation normal mobility with sit<>stand, sit<>supine    Gait/Locomotion Normal gait pattern    SLR increased neural tension on the left    Slump Test positive on the left    Planned Therapy Interventions   Planned Therapy Interventions manual therapy;strengthening;stretching;ROM;neuromuscular re-education   Clinical Impression   Criteria for Skilled Therapeutic Interventions Met yes, treatment indicated   PT Diagnosis low back pain with left LE radiculopathy with associated increased neural tension in left LE,  decreased core and glute activation.    Influenced by the following impairments pain, weakness, radicular symptoms    Functional limitations due to impairments walking, fitness    Clinical Presentation Stable/Uncomplicated   Clinical Presentation Rationale symptoms are consistent with the diagnosis   Clinical Decision Making (Complexity) Low complexity   Therapy Frequency   (10 visits )   Predicted Duration of Therapy Intervention (days/wks) 8 weeks    Risk & Benefits of therapy have been explained Yes   Patient, Family & other staff in agreement with plan of care Yes   Education Assessment   Preferred Learning Style Listening;Demonstration;Pictures/video   Barriers to Learning No barriers   ORTHO GOALS   PT Ortho Eval Goals 1;2;3   Ortho Goal 1   Goal Identifier Pain   Goal Description Report no pain upon waking in the morning.    Target Date 02/17/21   Ortho Goal 2   Goal Identifier Walking   Goal Description Return to walking 15,000 step per day without pain    Target Date 03/17/21   Ortho Goal 3   Goal Identifier Left LE radicular symptoms   Goal Description Report resolution of left LE radicular symptoms to allow prolonged standing at work without symptoms    Target Date 03/17/21   Total Evaluation Time   PT  Rocio, Low Complexity Minutes (87727) 20

## 2021-01-29 ENCOUNTER — HOSPITAL ENCOUNTER (OUTPATIENT)
Dept: PHYSICAL THERAPY | Facility: OTHER | Age: 51
Setting detail: THERAPIES SERIES
End: 2021-01-29
Attending: INTERNAL MEDICINE
Payer: COMMERCIAL

## 2021-01-29 PROCEDURE — 97110 THERAPEUTIC EXERCISES: CPT | Mod: GP

## 2021-02-05 ENCOUNTER — HOSPITAL ENCOUNTER (OUTPATIENT)
Dept: PHYSICAL THERAPY | Facility: OTHER | Age: 51
Setting detail: THERAPIES SERIES
End: 2021-02-05
Attending: INTERNAL MEDICINE
Payer: COMMERCIAL

## 2021-02-05 PROCEDURE — 97110 THERAPEUTIC EXERCISES: CPT | Mod: GP

## 2021-02-10 ENCOUNTER — HOSPITAL ENCOUNTER (OUTPATIENT)
Dept: PHYSICAL THERAPY | Facility: OTHER | Age: 51
Setting detail: THERAPIES SERIES
End: 2021-02-10
Attending: INTERNAL MEDICINE
Payer: COMMERCIAL

## 2021-02-10 PROCEDURE — 97110 THERAPEUTIC EXERCISES: CPT | Mod: GP

## 2021-02-17 ENCOUNTER — HOSPITAL ENCOUNTER (OUTPATIENT)
Dept: PHYSICAL THERAPY | Facility: OTHER | Age: 51
Setting detail: THERAPIES SERIES
End: 2021-02-17
Attending: INTERNAL MEDICINE
Payer: COMMERCIAL

## 2021-02-17 PROCEDURE — 97110 THERAPEUTIC EXERCISES: CPT | Mod: GP

## 2021-03-12 ENCOUNTER — TRANSFERRED RECORDS (OUTPATIENT)
Dept: HEALTH INFORMATION MANAGEMENT | Facility: OTHER | Age: 51
End: 2021-03-12

## 2021-03-12 DIAGNOSIS — F34.1 DYSTHYMIC DISORDER: ICD-10-CM

## 2021-03-12 RX ORDER — FLUOXETINE 10 MG/1
CAPSULE ORAL
Qty: 180 CAPSULE | Refills: 1 | Status: SHIPPED | OUTPATIENT
Start: 2021-03-12 | End: 2021-06-25

## 2021-03-12 NOTE — TELEPHONE ENCOUNTER
[facility] sent Rx request for the following:   Requested Prescriptions   Pending Prescriptions Disp Refills     FLUoxetine (PROZAC) 10 MG capsule [Pharmacy Med Name: fluoxetine 10 mg capsule] 180 capsule 3     Sig: TAKE 3 CAPSULES BY MOUTH EVERY MORNING       SSRIs Protocol Failed - 3/12/2021 10:34 AM        Failed - PHQ-9 score less than 5 in past 6 months     Please review last PHQ-9 score.      Last Prescription Date:   5/7/2020  Last Fill Qty/Refills:         180, R-3     Last Office Visit:              1/7/2021 (Lakesha)  Future Office visit:           None noted   Routing refill request to provider for review/approval because:  Failed protocol. Sada Walsh RN ....................  3/12/2021   12:52 PM

## 2021-05-18 ENCOUNTER — HOSPITAL ENCOUNTER (OUTPATIENT)
Dept: MRI IMAGING | Facility: OTHER | Age: 51
Discharge: HOME | End: 2021-05-18
Attending: INTERNAL MEDICINE | Admitting: INTERNAL MEDICINE
Payer: COMMERCIAL

## 2021-05-18 DIAGNOSIS — M79.662 PAIN IN BOTH LOWER LEGS: ICD-10-CM

## 2021-05-18 DIAGNOSIS — M79.661 PAIN IN BOTH LOWER LEGS: ICD-10-CM

## 2021-05-18 DIAGNOSIS — R20.0 NUMBNESS AND TINGLING OF LEFT LEG: ICD-10-CM

## 2021-05-18 DIAGNOSIS — R20.2 NUMBNESS AND TINGLING OF LEFT LEG: ICD-10-CM

## 2021-05-18 DIAGNOSIS — M51.369 DDD (DEGENERATIVE DISC DISEASE), LUMBAR: ICD-10-CM

## 2021-05-18 PROCEDURE — A9575 INJ GADOTERATE MEGLUMI 0.1ML: HCPCS | Performed by: INTERNAL MEDICINE

## 2021-05-18 PROCEDURE — 72158 MRI LUMBAR SPINE W/O & W/DYE: CPT

## 2021-05-18 PROCEDURE — 255N000002 HC RX 255 OP 636: Performed by: INTERNAL MEDICINE

## 2021-05-18 RX ORDER — GADOTERATE MEGLUMINE 376.9 MG/ML
20 INJECTION INTRAVENOUS ONCE
Status: COMPLETED | OUTPATIENT
Start: 2021-05-18 | End: 2021-05-18

## 2021-05-18 RX ADMIN — GADOTERATE MEGLUMINE 17 ML: 376.9 INJECTION INTRAVENOUS at 17:23

## 2021-05-19 ENCOUNTER — MYC MEDICAL ADVICE (OUTPATIENT)
Dept: INTERNAL MEDICINE | Facility: OTHER | Age: 51
End: 2021-05-19

## 2021-05-20 NOTE — TELEPHONE ENCOUNTER
Patient notified to request the MRI through [facility].  Alycia Birmingham CMA([facility])..................5/20/2021   10:23 AM

## 2021-06-25 ENCOUNTER — OFFICE VISIT (OUTPATIENT)
Dept: INTERNAL MEDICINE | Facility: OTHER | Age: 51
End: 2021-06-25
Attending: INTERNAL MEDICINE
Payer: COMMERCIAL

## 2021-06-25 VITALS
OXYGEN SATURATION: 98 % | HEART RATE: 66 BPM | WEIGHT: 184 LBS | TEMPERATURE: 95.6 F | BODY MASS INDEX: 29.57 KG/M2 | SYSTOLIC BLOOD PRESSURE: 118 MMHG | RESPIRATION RATE: 16 BRPM | HEIGHT: 66 IN | DIASTOLIC BLOOD PRESSURE: 82 MMHG

## 2021-06-25 DIAGNOSIS — R63.5 WEIGHT GAIN: ICD-10-CM

## 2021-06-25 DIAGNOSIS — E10.9 TYPE 1 DIABETES MELLITUS WITHOUT COMPLICATION (H): ICD-10-CM

## 2021-06-25 DIAGNOSIS — Z13.220 SCREENING FOR HYPERLIPIDEMIA: ICD-10-CM

## 2021-06-25 DIAGNOSIS — E06.3 HYPOTHYROIDISM DUE TO HASHIMOTO'S THYROIDITIS: ICD-10-CM

## 2021-06-25 DIAGNOSIS — E53.8 VITAMIN B12 DEFICIENCY (NON ANEMIC): ICD-10-CM

## 2021-06-25 DIAGNOSIS — M51.369 DDD (DEGENERATIVE DISC DISEASE), LUMBAR: ICD-10-CM

## 2021-06-25 DIAGNOSIS — F34.1 DYSTHYMIC DISORDER: Primary | ICD-10-CM

## 2021-06-25 DIAGNOSIS — R20.0 NUMBNESS OF LEFT FOOT: ICD-10-CM

## 2021-06-25 DIAGNOSIS — Z12.31 ENCOUNTER FOR SCREENING MAMMOGRAM FOR BREAST CANCER: ICD-10-CM

## 2021-06-25 LAB
ALBUMIN SERPL-MCNC: 4.2 G/DL (ref 3.5–5.7)
ALP SERPL-CCNC: 57 U/L (ref 34–104)
ALT SERPL W P-5'-P-CCNC: 15 U/L (ref 7–52)
ANION GAP SERPL CALCULATED.3IONS-SCNC: 7 MMOL/L (ref 3–14)
AST SERPL W P-5'-P-CCNC: 17 U/L (ref 13–39)
BILIRUB SERPL-MCNC: 0.5 MG/DL (ref 0.3–1)
BUN SERPL-MCNC: 17 MG/DL (ref 7–25)
CALCIUM SERPL-MCNC: 9.7 MG/DL (ref 8.6–10.3)
CHLORIDE SERPL-SCNC: 102 MMOL/L (ref 98–107)
CHOLEST SERPL-MCNC: 275 MG/DL
CO2 SERPL-SCNC: 29 MMOL/L (ref 21–31)
CREAT SERPL-MCNC: 0.92 MG/DL (ref 0.6–1.2)
CREAT UR-MCNC: 211 MG/DL
ERYTHROCYTE [DISTWIDTH] IN BLOOD BY AUTOMATED COUNT: 13 % (ref 10–15)
GFR SERPL CREATININE-BSD FRML MDRD: 64 ML/MIN/{1.73_M2}
GLUCOSE SERPL-MCNC: 162 MG/DL (ref 70–105)
HBA1C MFR BLD: 7.1 % (ref 4–6)
HCT VFR BLD AUTO: 40.8 % (ref 35–47)
HDLC SERPL-MCNC: 90 MG/DL (ref 23–92)
HGB BLD-MCNC: 13.9 G/DL (ref 11.7–15.7)
LDLC SERPL CALC-MCNC: 174 MG/DL
MCH RBC QN AUTO: 31.4 PG (ref 26.5–33)
MCHC RBC AUTO-ENTMCNC: 34.1 G/DL (ref 31.5–36.5)
MCV RBC AUTO: 92 FL (ref 78–100)
MICROALBUMIN UR-MCNC: 13 MG/L
MICROALBUMIN/CREAT UR: 6.21 MG/G CR (ref 0–25)
NONHDLC SERPL-MCNC: 185 MG/DL
PLATELET # BLD AUTO: 303 10E9/L (ref 150–450)
POTASSIUM SERPL-SCNC: 3.9 MMOL/L (ref 3.5–5.1)
PROT SERPL-MCNC: 7.4 G/DL (ref 6.4–8.9)
RBC # BLD AUTO: 4.43 10E12/L (ref 3.8–5.2)
SODIUM SERPL-SCNC: 138 MMOL/L (ref 134–144)
TRIGL SERPL-MCNC: 54 MG/DL
TSH SERPL DL<=0.05 MIU/L-ACNC: 3.49 IU/ML (ref 0.34–5.6)
VIT B12 SERPL-MCNC: 164 PG/ML (ref 180–914)
WBC # BLD AUTO: 5.3 10E9/L (ref 4–11)

## 2021-06-25 PROCEDURE — 90471 IMMUNIZATION ADMIN: CPT | Performed by: INTERNAL MEDICINE

## 2021-06-25 PROCEDURE — 82607 VITAMIN B-12: CPT | Mod: ZL | Performed by: INTERNAL MEDICINE

## 2021-06-25 PROCEDURE — 90636 HEP A/HEP B VACC ADULT IM: CPT | Performed by: INTERNAL MEDICINE

## 2021-06-25 PROCEDURE — 83036 HEMOGLOBIN GLYCOSYLATED A1C: CPT | Mod: ZL | Performed by: INTERNAL MEDICINE

## 2021-06-25 PROCEDURE — 80061 LIPID PANEL: CPT | Mod: ZL | Performed by: INTERNAL MEDICINE

## 2021-06-25 PROCEDURE — 84443 ASSAY THYROID STIM HORMONE: CPT | Mod: ZL | Performed by: INTERNAL MEDICINE

## 2021-06-25 PROCEDURE — 36415 COLL VENOUS BLD VENIPUNCTURE: CPT | Mod: ZL | Performed by: INTERNAL MEDICINE

## 2021-06-25 PROCEDURE — 99396 PREV VISIT EST AGE 40-64: CPT | Mod: 25 | Performed by: INTERNAL MEDICINE

## 2021-06-25 PROCEDURE — 85027 COMPLETE CBC AUTOMATED: CPT | Mod: ZL | Performed by: INTERNAL MEDICINE

## 2021-06-25 PROCEDURE — 80053 COMPREHEN METABOLIC PANEL: CPT | Mod: ZL | Performed by: INTERNAL MEDICINE

## 2021-06-25 PROCEDURE — 82043 UR ALBUMIN QUANTITATIVE: CPT | Mod: ZL | Performed by: INTERNAL MEDICINE

## 2021-06-25 RX ORDER — PROCHLORPERAZINE 25 MG/1
SUPPOSITORY RECTAL
COMMUNITY
Start: 2021-04-26

## 2021-06-25 RX ORDER — PROCHLORPERAZINE 25 MG/1
SUPPOSITORY RECTAL
COMMUNITY
Start: 2021-05-29

## 2021-06-25 RX ORDER — LEVOTHYROXINE SODIUM 112 UG/1
112 TABLET ORAL
Qty: 90 TABLET | Refills: 3 | Status: SHIPPED | OUTPATIENT
Start: 2021-06-25 | End: 2022-08-02

## 2021-06-25 RX ORDER — FLUOXETINE 10 MG/1
CAPSULE ORAL
Qty: 180 CAPSULE | Refills: 3 | Status: SHIPPED | OUTPATIENT
Start: 2021-06-25 | End: 2022-05-03

## 2021-06-25 SDOH — HEALTH STABILITY: MENTAL HEALTH: HOW OFTEN DO YOU HAVE A DRINK CONTAINING ALCOHOL?: NOT ASKED

## 2021-06-25 SDOH — HEALTH STABILITY: MENTAL HEALTH: HOW OFTEN DO YOU HAVE SIX OR MORE DRINKS ON ONE OCCASION?: NOT ASKED

## 2021-06-25 SDOH — HEALTH STABILITY: MENTAL HEALTH: HOW MANY DRINKS CONTAINING ALCOHOL DO YOU HAVE ON A TYPICAL DAY WHEN YOU ARE DRINKING?: NOT ASKED

## 2021-06-25 ASSESSMENT — MIFFLIN-ST. JEOR: SCORE: 1452.4

## 2021-06-25 ASSESSMENT — PATIENT HEALTH QUESTIONNAIRE - PHQ9: SUM OF ALL RESPONSES TO PHQ QUESTIONS 1-9: 2

## 2021-06-25 ASSESSMENT — PAIN SCALES - GENERAL: PAINLEVEL: NO PAIN (0)

## 2021-06-25 NOTE — NURSING NOTE
Patient presents to clinic today for physical. Questions about neuropathy and follow up on neurosurgery.  Check skin tag under left arm.    Previous A1C is at goal of <8  Lab Results   Component Value Date    A1C 7.0 01/07/2021    A1C 6.9 07/02/2020    A1C 7.2 05/07/2020    A1C 7.8 04/10/2019    A1C 6.9 03/02/2018     Urine microalbumin:creatine: 4/1/2019 (future order in chart)  Foot exam 9/10/19 - due  Eye exam 11/18/19 - patient aware she is due    Tobacco User no  Patient is on a daily aspirin  Patient is not on a Statin due to allergy  Blood pressure today of:     BP Readings from Last 1 Encounters:   06/25/21 118/82     is at the goal of <139/89 for diabetics.    Medication reconciliation completed.    Alycia Birmingham CMA([facility])..................6/25/2021   10:10 AM     Immunization Documentation    Prior to Immunization administration, verified patients identity using patient's name and date of birth. Please see IMMUNIZATIONS  and order for additional information.  Patient / Parent instructed to remain in clinic for 15 minutes and report any adverse reaction to staff immediately.    Was entire vial of medication used? Yes  Vial/Syringe: Syringe    Alycia Birmingham CMA([facility]).............6/25/2021  11:36 AM

## 2021-07-01 NOTE — PROGRESS NOTES
Outpatient Physical Therapy Discharge Note     Patient: Floresita Hill  : 1970    Beginning/End Dates of Reporting Period:  2021 to 2021    Referring Provider: Dr. Crowder     Therapy Diagnosis: Numbness and tingling of left leg R20.0, R20.2, Pain in both lower legs M79.661, M79.662       Client Self Report at time of final visit on 2021.: Patient reports continued progress with current symptoms.  She has noted that she has been able to increase her activity level.  She is walking approximately 8000 steps a day.  She reports compliance with the current home exercise program.  She reports that rolling over in bed when sleeping has become much easier now.     No additional PT appointments were scheduled after 2021.    Objective Measurements: Not assessed due to unplanned discharge      Goals: Not assessed due to unplanned discharge      Plan:  Discharge from therapy.    Discharge:    Reason for Discharge: Patient chooses to discontinue therapy.      Discharge Plan: Patient to continue home program. Follow up with Dr. Crowder

## 2021-07-07 ENCOUNTER — HOSPITAL ENCOUNTER (OUTPATIENT)
Dept: MAMMOGRAPHY | Facility: OTHER | Age: 51
Discharge: HOME | End: 2021-07-07
Attending: INTERNAL MEDICINE | Admitting: INTERNAL MEDICINE
Payer: COMMERCIAL

## 2021-07-07 DIAGNOSIS — Z12.31 ENCOUNTER FOR SCREENING MAMMOGRAM FOR BREAST CANCER: ICD-10-CM

## 2021-07-07 PROCEDURE — 77063 BREAST TOMOSYNTHESIS BI: CPT

## 2021-07-12 ENCOUNTER — TELEPHONE (OUTPATIENT)
Dept: FAMILY MEDICINE | Facility: OTHER | Age: 51
End: 2021-07-12

## 2021-07-12 DIAGNOSIS — E10.9 TYPE 1 DIABETES MELLITUS WITHOUT COMPLICATION (H): Primary | ICD-10-CM

## 2021-07-12 RX ORDER — PROCHLORPERAZINE 25 MG/1
SUPPOSITORY RECTAL
Qty: 3 EACH | Refills: 11 | Status: SHIPPED | OUTPATIENT
Start: 2021-07-12 | End: 2022-07-14

## 2021-07-12 RX ORDER — PROCHLORPERAZINE 25 MG/1
SUPPOSITORY RECTAL
Qty: 1 EACH | Refills: 3 | Status: SHIPPED | OUTPATIENT
Start: 2021-07-12 | End: 2022-07-14

## 2021-07-12 NOTE — TELEPHONE ENCOUNTER
We ran a test claim on [facility]'s Dexcom transmitter and it went through insurance. She would like to receive here if possible so scripts for both transmitter and   are being sent to [facility] for filling. Per Collaborative Practice Agreement

## 2021-09-04 ENCOUNTER — HEALTH MAINTENANCE LETTER (OUTPATIENT)
Age: 51
End: 2021-09-04

## 2021-10-07 ENCOUNTER — IMMUNIZATION (OUTPATIENT)
Dept: FAMILY MEDICINE | Facility: OTHER | Age: 51
End: 2021-10-07
Attending: FAMILY MEDICINE

## 2021-10-07 PROCEDURE — 0004A PR COVID VAC PFIZER DIL RECON 30 MCG/0.3 ML IM: CPT

## 2021-10-07 PROCEDURE — 91300 PR COVID VAC PFIZER DIL RECON 30 MCG/0.3 ML IM: CPT

## 2021-10-08 ENCOUNTER — TRANSFERRED RECORDS (OUTPATIENT)
Dept: HEALTH INFORMATION MANAGEMENT | Facility: OTHER | Age: 51
End: 2021-10-08

## 2021-10-14 ENCOUNTER — PATIENT OUTREACH (OUTPATIENT)
Dept: INTERNAL MEDICINE | Facility: OTHER | Age: 51
End: 2021-10-14

## 2021-10-14 NOTE — TELEPHONE ENCOUNTER
Patient Quality Outreach      Summary:    Patient has the following on her problem list/HM:     Diabetes    Last A1C:  Lab Results   Component Value Date    A1C 7.1 06/25/2021    A1C 7.0 01/07/2021       Last LDL:    Lab Results   Component Value Date     06/25/2021       Is the patient on a Statin? No          Is the patient on Aspirin? Yes    Medications     Salicylates     aspirin EC 81 MG EC tablet             Last three blood pressure readings:  BP Readings from Last 3 Encounters:   06/25/21 118/82   01/07/21 136/80   07/02/20 112/70            Tobacco Use      Smoking status: Never Smoker      Smokeless tobacco: Never Used          Patient is due/failing the following:   Diabetes -  Eye Exam    Type of outreach:    Sent Need message.    Questions for provider review:    None                                                                                                                                     Catherine Villarreal LPN .............10/14/2021  2:15 PM       Chart routed to .

## 2021-12-08 ENCOUNTER — LAB (OUTPATIENT)
Dept: LAB | Facility: OTHER | Age: 51
End: 2021-12-08
Attending: INTERNAL MEDICINE
Payer: COMMERCIAL

## 2021-12-08 DIAGNOSIS — E10.9 TYPE 1 DIABETES MELLITUS WITHOUT COMPLICATION (H): ICD-10-CM

## 2021-12-08 DIAGNOSIS — E53.8 VITAMIN B12 DEFICIENCY (NON ANEMIC): ICD-10-CM

## 2021-12-08 LAB
CREAT UR-MCNC: 221 MG/DL
HBA1C MFR BLD: 7 % (ref 4–6.2)
MICROALBUMIN UR-MCNC: 14 MG/L
MICROALBUMIN/CREAT UR: 6.33 MG/G CR (ref 0–25)
VIT B12 SERPL-MCNC: 691 PG/ML (ref 180–914)

## 2021-12-08 PROCEDURE — 36415 COLL VENOUS BLD VENIPUNCTURE: CPT | Mod: ZL

## 2021-12-08 PROCEDURE — 82043 UR ALBUMIN QUANTITATIVE: CPT | Mod: ZL

## 2021-12-08 PROCEDURE — 83036 HEMOGLOBIN GLYCOSYLATED A1C: CPT | Mod: ZL

## 2021-12-08 PROCEDURE — 82607 VITAMIN B-12: CPT | Mod: ZL

## 2021-12-30 ENCOUNTER — LAB REQUISITION (OUTPATIENT)
Dept: LAB | Facility: OTHER | Age: 51
End: 2021-12-30

## 2021-12-30 LAB
FLUAV RNA SPEC QL NAA+PROBE: NEGATIVE
FLUBV RNA RESP QL NAA+PROBE: NEGATIVE
RSV RNA SPEC NAA+PROBE: NEGATIVE
SARS-COV-2 RNA RESP QL NAA+PROBE: NEGATIVE

## 2021-12-30 PROCEDURE — 87637 SARSCOV2&INF A&B&RSV AMP PRB: CPT | Performed by: FAMILY MEDICINE

## 2022-04-14 ENCOUNTER — ALLIED HEALTH/NURSE VISIT (OUTPATIENT)
Dept: FAMILY MEDICINE | Facility: OTHER | Age: 52
End: 2022-04-14
Attending: FAMILY MEDICINE

## 2022-04-14 DIAGNOSIS — Z23 NEED FOR COVID-19 VACCINE: Primary | ICD-10-CM

## 2022-04-14 PROCEDURE — 91305 COVID-19,PF,PFIZER (12+ YRS): CPT

## 2022-04-14 PROCEDURE — 0054A COVID-19,PF,PFIZER (12+ YRS): CPT

## 2022-05-03 DIAGNOSIS — F34.1 DYSTHYMIC DISORDER: ICD-10-CM

## 2022-05-03 NOTE — TELEPHONE ENCOUNTER
"Encounter routed to  [facility] to schedule an annual visit. PHQ-9 sent via alternate encounter. Mitzy Marcano RN on 5/3/2022 at 11:47 AM    Last Prescription Date: 6/25/21  Last Qty/Refills: 180 / 3  Last Office Visit: 6/25/21 Lakesha  Future Office Visit: none     Requested Prescriptions   Pending Prescriptions Disp Refills     FLUoxetine (PROZAC) 10 MG capsule [Pharmacy Med Name: fluoxetine 10 mg capsule] 180 capsule 3     Sig: TAKE 3 CAPSULES BY MOUTH EVERY MORNING       SSRIs Protocol Failed - 5/3/2022 10:36 AM        Failed - PHQ-9 score less than 5 in past 6 months     Please review last PHQ-9 score.           Failed - Recent (6 mo) or future (30 days) visit within the authorizing provider's specialty     Patient had office visit in the last 6 months or has a visit in the next 30 days with authorizing provider or within the authorizing provider's specialty.  See \"Patient Info\" tab in inbasket, or \"Choose Columns\" in Meds & Orders section of the refill encounter.            Passed - Medication is active on med list        Passed - Patient is age 18 or older        Passed - No active pregnancy on record        Passed - No positive pregnancy test in last 12 months               "

## 2022-05-04 RX ORDER — FLUOXETINE 10 MG/1
30 CAPSULE ORAL DAILY
Qty: 270 CAPSULE | Refills: 0 | Status: SHIPPED | OUTPATIENT
Start: 2022-05-04 | End: 2022-08-02

## 2022-05-04 RX ORDER — FLUOXETINE 10 MG/1
CAPSULE ORAL
Qty: 180 CAPSULE | Refills: 0 | Status: SHIPPED | OUTPATIENT
Start: 2022-05-04 | End: 2022-05-04

## 2022-05-04 NOTE — TELEPHONE ENCOUNTER
Patient is scheduled for physical on 7/25/2022 at 8:20am.    Alycia Milton on 5/4/2022 at 10:57 AM

## 2022-05-13 ENCOUNTER — OFFICE VISIT (OUTPATIENT)
Dept: FAMILY MEDICINE | Facility: OTHER | Age: 52
End: 2022-05-13
Attending: NURSE PRACTITIONER
Payer: COMMERCIAL

## 2022-05-13 VITALS
HEART RATE: 92 BPM | RESPIRATION RATE: 18 BRPM | SYSTOLIC BLOOD PRESSURE: 120 MMHG | TEMPERATURE: 98 F | OXYGEN SATURATION: 96 % | BODY MASS INDEX: 30.25 KG/M2 | WEIGHT: 186 LBS | DIASTOLIC BLOOD PRESSURE: 76 MMHG

## 2022-05-13 DIAGNOSIS — B35.1 FUNGAL INFECTION OF TOENAIL: Primary | ICD-10-CM

## 2022-05-13 PROCEDURE — 99212 OFFICE O/P EST SF 10 MIN: CPT | Performed by: NURSE PRACTITIONER

## 2022-05-13 ASSESSMENT — PAIN SCALES - GENERAL: PAINLEVEL: NO PAIN (0)

## 2022-05-13 NOTE — PROGRESS NOTES
Assessment & Plan   Problem List Items Addressed This Visit    None     Visit Diagnoses     Fungal infection of toenail    -  Primary         Fungal infection to right great toenail.  We did discuss oral medications versus over-the-counter treatment such as Vicks VapoRub.  As this is only affecting one nail and is mild, she has opted to do a trial of Vicks baby rub to the toenail daily and clip nail as this grows out.  If this seems to not be improving, worsening or spreading to other toenails will consider oral treatments.  Follow-up as needed.      No follow-ups on file.    JAHAIRA [facility]    Subjective   Floresita is a 52 year old who presents for the following health issues     Toenail    History of Present Illness       Reason for visit:  Toe fungus  Symptom onset:  1-2 weeks ago    She eats 2-3 servings of fruits and vegetables daily.She consumes 0 sweetened beverage(s) daily.She exercises with enough effort to increase her heart rate 60 or more minutes per day.  She exercises with enough effort to increase her heart rate 5 days per week.   She is taking medications regularly.     She comes in today with concerns about toenail fungus to her right great toenail.  She has noticed this over the past 2 weeks that her nail is discolored, yellowish.  She does not have any pain or drainage from the toe or toenail.  She does have type 1 diabetes has been well controlled.    Review of Systems   See above      Objective    /76 (BP Location: Right arm, Patient Position: Sitting, Cuff Size: Adult Large)   Pulse 92   Temp 98  F (36.7  C) (Tympanic)   Resp 18   Wt 84.4 kg (186 lb)   SpO2 96%   Breastfeeding No   BMI 30.25 kg/m    Body mass index is 30.25 kg/m .  Physical Exam   GENERAL: healthy, alert and no distress  SKIN: Right great toenail mildly thickened, dark yellowish color discoloration to nail  NEURO: Normal strength and tone, mentation intact and speech  normal  PSYCH: mentation appears normal, affect normal/bright

## 2022-06-11 ENCOUNTER — HEALTH MAINTENANCE LETTER (OUTPATIENT)
Age: 52
End: 2022-06-11

## 2022-06-24 DIAGNOSIS — E10.9 TYPE 1 DIABETES MELLITUS WITHOUT COMPLICATION (H): ICD-10-CM

## 2022-06-24 RX ORDER — INSULIN ASPART 100 [IU]/ML
INJECTION, SOLUTION INTRAVENOUS; SUBCUTANEOUS
Qty: 40 ML | Refills: 0 | Status: SHIPPED | OUTPATIENT
Start: 2022-06-24 | End: 2022-08-02

## 2022-06-24 NOTE — TELEPHONE ENCOUNTER
[facility] sent Rx request for the following:      Requested Prescriptions   Pending Prescriptions Disp Refills     NOVOLOG VIAL 100 UNIT/ML soln [Pharmacy Med Name: Novolog U-100 Insulin aspart 100 unit/mL subcutaneous solution] 40 mL 4     Sig: Per pump Medtronic subcutaneous administration       Short Acting Insulin Protocol Failed - 6/24/2022 11:50 AM        Failed - HgbA1C in past 3 or 6 months     If HgbA1C is 8 or greater, it needs to be on file within the past 3 months.  If less than 8, must be on file within the past 6 months.     Recent Labs   Lab Test 12/08/21  0733 03/02/18  0802 07/05/16  1706   A1C 7.0*   < >  --    [id number]  --   --  7.0*    < > = values in this interval not displayed.           Failed - Recent (6 mo) or future (30 days) visit within the authorizing provider's specialty     Last Prescription Date:   6/25/21  Last Fill Qty/Refills:         40 ml, R-4    Last Office Visit:              5/13/22, last annual exam was 6/25/21. Pt sees endocrinology.   Future Office visit:             Next 5 appointments (look out 90 days)    Jul 25, 2022  8:20 AM  PHYSICAL with Bety Crowder DO  [facility] ([facility] ) [address]  [facility] [address]  [phone]        Francheska Veliz RN .............. 6/24/2022  11:52 AM

## 2022-07-14 DIAGNOSIS — E10.9 TYPE 1 DIABETES MELLITUS WITHOUT COMPLICATION (H): ICD-10-CM

## 2022-07-14 RX ORDER — PROCHLORPERAZINE 25 MG/1
SUPPOSITORY RECTAL
Qty: 3 EACH | Refills: 11 | Status: SHIPPED | OUTPATIENT
Start: 2022-07-14 | End: 2023-06-29

## 2022-07-14 RX ORDER — PROCHLORPERAZINE 25 MG/1
SUPPOSITORY RECTAL
Qty: 1 EACH | Refills: 3 | Status: SHIPPED | OUTPATIENT
Start: 2022-07-14 | End: 2023-06-29

## 2022-07-14 NOTE — TELEPHONE ENCOUNTER
Routing refill request to provider for review/approval because:    LOV: 6/25/21  Patient is due for annual review  Patient noted to have an appointment on 7/25 with Dr. Lakesha Jha, RN on 7/14/2022 at 9:49 AM

## 2022-08-02 ENCOUNTER — OFFICE VISIT (OUTPATIENT)
Dept: INTERNAL MEDICINE | Facility: OTHER | Age: 52
End: 2022-08-02
Attending: INTERNAL MEDICINE
Payer: COMMERCIAL

## 2022-08-02 VITALS
HEIGHT: 66 IN | DIASTOLIC BLOOD PRESSURE: 88 MMHG | TEMPERATURE: 96.4 F | SYSTOLIC BLOOD PRESSURE: 132 MMHG | RESPIRATION RATE: 16 BRPM | WEIGHT: 180 LBS | OXYGEN SATURATION: 98 % | BODY MASS INDEX: 28.93 KG/M2 | HEART RATE: 66 BPM

## 2022-08-02 DIAGNOSIS — D50.9 IRON DEFICIENCY ANEMIA, UNSPECIFIED IRON DEFICIENCY ANEMIA TYPE: ICD-10-CM

## 2022-08-02 DIAGNOSIS — Z13.220 SCREENING FOR HYPERLIPIDEMIA: ICD-10-CM

## 2022-08-02 DIAGNOSIS — Z12.31 ENCOUNTER FOR SCREENING MAMMOGRAM FOR BREAST CANCER: ICD-10-CM

## 2022-08-02 DIAGNOSIS — F34.1 DYSTHYMIC DISORDER: ICD-10-CM

## 2022-08-02 DIAGNOSIS — H91.93 BILATERAL HEARING LOSS, UNSPECIFIED HEARING LOSS TYPE: ICD-10-CM

## 2022-08-02 DIAGNOSIS — E06.3 HYPOTHYROIDISM DUE TO HASHIMOTO'S THYROIDITIS: ICD-10-CM

## 2022-08-02 DIAGNOSIS — E06.3 HASHIMOTO'S THYROIDITIS: ICD-10-CM

## 2022-08-02 DIAGNOSIS — E10.9 TYPE 1 DIABETES MELLITUS WITHOUT COMPLICATION (H): Primary | ICD-10-CM

## 2022-08-02 LAB
ALBUMIN SERPL-MCNC: 4.1 G/DL (ref 3.5–5.7)
ALP SERPL-CCNC: 54 U/L (ref 34–104)
ALT SERPL W P-5'-P-CCNC: 14 U/L (ref 7–52)
ANION GAP SERPL CALCULATED.3IONS-SCNC: 6 MMOL/L (ref 3–14)
AST SERPL W P-5'-P-CCNC: 17 U/L (ref 13–39)
BILIRUB SERPL-MCNC: 0.5 MG/DL (ref 0.3–1)
BUN SERPL-MCNC: 10 MG/DL (ref 7–25)
CALCIUM SERPL-MCNC: 9.6 MG/DL (ref 8.6–10.3)
CHLORIDE BLD-SCNC: 103 MMOL/L (ref 98–107)
CHOLEST SERPL-MCNC: 267 MG/DL
CO2 SERPL-SCNC: 30 MMOL/L (ref 21–31)
CREAT SERPL-MCNC: 0.89 MG/DL (ref 0.6–1.2)
CREAT UR-MCNC: 207 MG/DL
ERYTHROCYTE [DISTWIDTH] IN BLOOD BY AUTOMATED COUNT: 12.7 % (ref 10–15)
FASTING STATUS PATIENT QL REPORTED: YES
GFR SERPL CREATININE-BSD FRML MDRD: 77 ML/MIN/1.73M2
GLUCOSE BLD-MCNC: 130 MG/DL (ref 70–105)
HBA1C MFR BLD: 6.4 % (ref 4–6.2)
HCT VFR BLD AUTO: 37.5 % (ref 35–47)
HDLC SERPL-MCNC: 78 MG/DL (ref 23–92)
HGB BLD-MCNC: 12.8 G/DL (ref 11.7–15.7)
LDLC SERPL CALC-MCNC: 177 MG/DL
MCH RBC QN AUTO: 31 PG (ref 26.5–33)
MCHC RBC AUTO-ENTMCNC: 34.1 G/DL (ref 31.5–36.5)
MCV RBC AUTO: 91 FL (ref 78–100)
MICROALBUMIN UR-MCNC: <12 MG/L
MICROALBUMIN/CREAT UR: NORMAL MG/G{CREAT}
NONHDLC SERPL-MCNC: 189 MG/DL
PLATELET # BLD AUTO: 298 10E3/UL (ref 150–450)
POTASSIUM BLD-SCNC: 4.2 MMOL/L (ref 3.5–5.1)
PROT SERPL-MCNC: 6.6 G/DL (ref 6.4–8.9)
RBC # BLD AUTO: 4.13 10E6/UL (ref 3.8–5.2)
SODIUM SERPL-SCNC: 139 MMOL/L (ref 134–144)
T4 FREE SERPL-MCNC: 1 NG/DL (ref 0.6–1.6)
TRIGL SERPL-MCNC: 60 MG/DL
TSH SERPL DL<=0.005 MIU/L-ACNC: 11.09 MU/L (ref 0.4–4)
WBC # BLD AUTO: 4.4 10E3/UL (ref 4–11)

## 2022-08-02 PROCEDURE — 85027 COMPLETE CBC AUTOMATED: CPT | Mod: ZL | Performed by: INTERNAL MEDICINE

## 2022-08-02 PROCEDURE — 84443 ASSAY THYROID STIM HORMONE: CPT | Mod: ZL | Performed by: INTERNAL MEDICINE

## 2022-08-02 PROCEDURE — 80053 COMPREHEN METABOLIC PANEL: CPT | Mod: ZL | Performed by: INTERNAL MEDICINE

## 2022-08-02 PROCEDURE — 99396 PREV VISIT EST AGE 40-64: CPT | Performed by: INTERNAL MEDICINE

## 2022-08-02 PROCEDURE — 83036 HEMOGLOBIN GLYCOSYLATED A1C: CPT | Mod: ZL | Performed by: INTERNAL MEDICINE

## 2022-08-02 PROCEDURE — 84439 ASSAY OF FREE THYROXINE: CPT | Mod: ZL | Performed by: INTERNAL MEDICINE

## 2022-08-02 PROCEDURE — 80061 LIPID PANEL: CPT | Mod: ZL | Performed by: INTERNAL MEDICINE

## 2022-08-02 PROCEDURE — 36415 COLL VENOUS BLD VENIPUNCTURE: CPT | Mod: ZL | Performed by: INTERNAL MEDICINE

## 2022-08-02 PROCEDURE — 82043 UR ALBUMIN QUANTITATIVE: CPT | Mod: ZL | Performed by: INTERNAL MEDICINE

## 2022-08-02 RX ORDER — FLUOXETINE 10 MG/1
30 CAPSULE ORAL DAILY
Qty: 270 CAPSULE | Refills: 3 | OUTPATIENT
Start: 2022-08-02

## 2022-08-02 RX ORDER — LEVOTHYROXINE SODIUM 112 UG/1
112 TABLET ORAL
Qty: 90 TABLET | Refills: 4 | Status: SHIPPED | OUTPATIENT
Start: 2022-08-02 | End: 2022-12-16

## 2022-08-02 RX ORDER — LEVOTHYROXINE SODIUM 112 UG/1
TABLET ORAL
Qty: 90 TABLET | Refills: 3 | OUTPATIENT
Start: 2022-08-02

## 2022-08-02 RX ORDER — FLUOXETINE 10 MG/1
30 CAPSULE ORAL DAILY
Qty: 270 CAPSULE | Refills: 4 | Status: SHIPPED | OUTPATIENT
Start: 2022-08-02 | End: 2023-10-04

## 2022-08-02 RX ORDER — INSULIN ASPART 100 [IU]/ML
INJECTION, SOLUTION INTRAVENOUS; SUBCUTANEOUS
Qty: 40 ML | Refills: 4 | Status: SHIPPED | OUTPATIENT
Start: 2022-08-02 | End: 2022-09-21

## 2022-08-02 ASSESSMENT — PATIENT HEALTH QUESTIONNAIRE - PHQ9
SUM OF ALL RESPONSES TO PHQ QUESTIONS 1-9: 2
SUM OF ALL RESPONSES TO PHQ QUESTIONS 1-9: 2
10. IF YOU CHECKED OFF ANY PROBLEMS, HOW DIFFICULT HAVE THESE PROBLEMS MADE IT FOR YOU TO DO YOUR WORK, TAKE CARE OF THINGS AT HOME, OR GET ALONG WITH OTHER PEOPLE: NOT DIFFICULT AT ALL

## 2022-08-02 ASSESSMENT — ENCOUNTER SYMPTOMS
PALPITATIONS: 0
JOINT SWELLING: 0
DIZZINESS: 0
BREAST MASS: 0
HEMATURIA: 0
HEMATOCHEZIA: 0
CHILLS: 0
FREQUENCY: 0
COUGH: 0
NAUSEA: 0
EYE PAIN: 0
NERVOUS/ANXIOUS: 0
CONSTIPATION: 0
WEAKNESS: 0
HEARTBURN: 0
DIARRHEA: 0
HEADACHES: 0
MYALGIAS: 0
PARESTHESIAS: 0
ARTHRALGIAS: 0
SHORTNESS OF BREATH: 0
FEVER: 0
SORE THROAT: 0
DYSURIA: 0
ABDOMINAL PAIN: 0

## 2022-08-02 ASSESSMENT — PAIN SCALES - GENERAL: PAINLEVEL: NO PAIN (0)
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
## 2022-08-02 NOTE — NURSING NOTE
Patient presents to clinic today for annual physical. She is fasting today.   LMP - believes it to be 1-2 years ago.  Previous A1C is at goal of <8  Lab Results   Component Value Date    A1C 7.0 12/08/2021    A1C 7.1 06/25/2021    A1C 7.0 01/07/2021    A1C 6.9 07/02/2020    ALBUMIN 4.2 06/25/2021     Date of last Foot exam due today  Eye exam Yes- Date of last eye exam: 10/8/21,  Location: [facility] in [place]  Patient is not a Tobacco User  Patient is on a daily aspirin  Patient is not on a statin  Blood pressure today of:     BP Readings from Last 1 Encounters:   08/02/22 132/88      is at the goal of <139/89 for diabetics.     Medication reconciliation completed.    ACP on file? No, form previously provided.     FOOD SECURITY SCREENING QUESTIONS    The next two questions are to help us understand your food security.  If you are feeling you need any assistance in this area, we have resources available to support you today.    Hunger Vital Signs:  Within the past 12 months we worried whether our food would run out before we got money to buy more. Never  Within the past 12 months the food we bought just didn't last and we didn't have money to get more. Never    Alycia Birmingham CMA([facility])..................8/2/2022   8:21 AM

## 2022-08-02 NOTE — TELEPHONE ENCOUNTER
"Refused refills, scripts filled this date at clinic appt  Maria Victoria Mathis RN on 8/2/2022 at 10:55 AM      Requested Prescriptions   Pending Prescriptions Disp Refills     levothyroxine (SYNTHROID/LEVOTHROID) 112 MCG tablet [Pharmacy Med Name: levothyroxine 112 mcg tablet] 90 tablet 3     Sig: Take 1 tablet (112 mcg) by mouth every morning (before breakfast)       Thyroid Protocol Failed - 8/2/2022  9:39 AM        Failed - Normal TSH on file in past 12 months     Recent Labs   Lab Test 08/02/22  0915   TSH 11.09*              Passed - Patient is 12 years or older        Passed - Recent (12 mo) or future (30 days) visit within the authorizing provider's specialty     Patient has had an office visit with the authorizing provider or a provider within the authorizing providers department within the previous 12 mos or has a future within next 30 days. See \"Patient Info\" tab in inbasket, or \"Choose Columns\" in Meds & Orders section of the refill encounter.              Passed - Medication is active on med list        Passed - No active pregnancy on record     If patient is pregnant or has had a positive pregnancy test, please check TSH.          Passed - No positive pregnancy test in past 12 months     If patient is pregnant or has had a positive pregnancy test, please check TSH.             FLUoxetine (PROZAC) 10 MG capsule [Pharmacy Med Name: fluoxetine 10 mg capsule] 270 capsule 3     Sig: Take 3 capsules (30 mg) by mouth daily       SSRIs Protocol Passed - 8/2/2022  9:39 AM        Passed - PHQ-9 score less than 5 in past 6 months     Please review last PHQ-9 score.           Passed - Medication is active on med list        Passed - Patient is age 18 or older        Passed - No active pregnancy on record        Passed - No positive pregnancy test in last 12 months        Passed - Recent (6 mo) or future (30 days) visit within the authorizing provider's specialty     Patient had office visit in the last 6 months or " "has a visit in the next 30 days with authorizing provider or within the authorizing provider's specialty.  See \"Patient Info\" tab in inbasket, or \"Choose Columns\" in Meds & Orders section of the refill encounter.                 " Ascites Abdominal Distention [facility] management

## 2022-08-03 NOTE — TELEPHONE ENCOUNTER
Identified patient's name and . Administered flu vaccine, IM. Patient tolerated well, aseptic technique maintained. Pain scale 0/10 with injection. Instructed patient to wait in the clinic for 15 minutes after the injection was given.           Roberto AZEVEDO From [employer] called and needs a certificate of medical necessity for this patient. Please call at [phone] ext. [phone].    Vesta Garcia on 1/14/2021 at 9:00 AM

## 2022-08-19 ENCOUNTER — OFFICE VISIT (OUTPATIENT)
Dept: FAMILY MEDICINE | Facility: OTHER | Age: 52
End: 2022-08-19
Attending: FAMILY MEDICINE
Payer: COMMERCIAL

## 2022-08-19 VITALS
HEART RATE: 63 BPM | RESPIRATION RATE: 16 BRPM | OXYGEN SATURATION: 98 % | DIASTOLIC BLOOD PRESSURE: 82 MMHG | BODY MASS INDEX: 29.76 KG/M2 | TEMPERATURE: 97.5 F | WEIGHT: 181.6 LBS | SYSTOLIC BLOOD PRESSURE: 118 MMHG

## 2022-08-19 DIAGNOSIS — L52 ERYTHEMA NODOSUM: Primary | ICD-10-CM

## 2022-08-19 PROCEDURE — 99213 OFFICE O/P EST LOW 20 MIN: CPT | Performed by: FAMILY MEDICINE

## 2022-08-19 RX ORDER — TRIAMCINOLONE ACETONIDE 1 MG/G
CREAM TOPICAL 2 TIMES DAILY
Qty: 80 G | Refills: 3 | Status: SHIPPED | OUTPATIENT
Start: 2022-08-19 | End: 2024-08-28

## 2022-08-19 ASSESSMENT — PAIN SCALES - GENERAL: PAINLEVEL: NO PAIN (0)

## 2022-08-19 NOTE — PROGRESS NOTES
Assessment & Plan     (L52) Erythema nodosum  (primary encounter diagnosis)  Comment: this might be a bunted zoster, given the unilateral distribution, roughly along dermatomal lines.  Has had symptoms for probably too long for anti viral meds.  I did offer them to her, but we decided to treat symptomatically with prescription topical steroids.   Plan: triamcinolone (KENALOG) 0.1 % external cream                            Return if symptoms worsen or fail to improve.    Panchito Mcclendon MD  [facility]    Subjective   Floresita is a 52 year old, presenting for the following health issues:  Derm Problem (Left shoulder)      History of Present Illness       Reason for visit:  Spreading rash on near left shoulder blade  Symptom onset:  1-3 days ago    She eats 2-3 servings of fruits and vegetables daily.She consumes 0 sweetened beverage(s) daily.She exercises with enough effort to increase her heart rate 60 or more minutes per day.  She exercises with enough effort to increase her heart rate 6 days per week. She is missing 1 dose(s) of medications per week.     Is worried it is shingles.  Linear rash.  Had her vaccine in July.  Some itching, improves with OTC cortisone.  Bra strap irritates it.  No tick bites, no fevers.  No new contacts.          Review of Systems         Objective    /82   Pulse 63   Temp 97.5  F (36.4  C) (Tympanic)   Resp 16   Wt 82.4 kg (181 lb 9.6 oz)   LMP 11/01/2020 (Approximate)   SpO2 98%   Breastfeeding No   BMI 29.76 kg/m    Body mass index is 29.76 kg/m .  Physical Exam  Skin:     Comments: Left of T4 there are 4 slightly raised red nodules. Round.  Smallest is about 5 cm and largest is 2-3 cm.  Linear distribution.  All are blanchable. No crusting or ulcerations.     Neurological:      General: No focal deficit present.      Mental Status: She is alert and oriented to person, place, and time.   Psychiatric:         Mood and Affect: Mood normal.          Behavior: Behavior normal.         Thought Content: Thought content normal.                            .  ..

## 2022-08-20 ENCOUNTER — OFFICE VISIT (OUTPATIENT)
Dept: FAMILY MEDICINE | Facility: OTHER | Age: 52
End: 2022-08-20
Attending: PHYSICIAN ASSISTANT
Payer: COMMERCIAL

## 2022-08-20 VITALS
RESPIRATION RATE: 16 BRPM | TEMPERATURE: 97.4 F | DIASTOLIC BLOOD PRESSURE: 73 MMHG | SYSTOLIC BLOOD PRESSURE: 122 MMHG | HEIGHT: 66 IN | HEART RATE: 65 BPM | OXYGEN SATURATION: 98 % | WEIGHT: 180.7 LBS | BODY MASS INDEX: 29.04 KG/M2

## 2022-08-20 DIAGNOSIS — R21 RASH: ICD-10-CM

## 2022-08-20 PROCEDURE — 99213 OFFICE O/P EST LOW 20 MIN: CPT | Performed by: FAMILY MEDICINE

## 2022-08-20 ASSESSMENT — PAIN SCALES - GENERAL: PAINLEVEL: NO PAIN (0)

## 2022-08-20 NOTE — NURSING NOTE
"Chief Complaint   Patient presents with     Derm Problem     Rash started on 8-16-22  it is spreading all over now       Medication reconciliation completed.    FOOD SECURITY SCREENING QUESTIONS:    The next two questions are to help us understand your food security.  If you are feeling you need any assistance in this area, we have resources available to support you today.    Hunger Vital Signs:  Within the past 12 months we worried whether our food would run out before we got money to buy more. Never  Within the past 12 months the food we bought just didn't last and we didn't have money to get more. Never    Initial Pulse 65   Temp 97.4  F (36.3  C) (Temporal)   Resp 16   Ht 1.664 m (5' 5.5\")   Wt 82 kg (180 lb 11.2 oz)   LMP 11/01/2020 (Approximate)   SpO2 98%   Breastfeeding No   BMI 29.61 kg/m   Estimated body mass index is 29.61 kg/m  as calculated from the following:    Height as of this encounter: 1.664 m (5' 5.5\").    Weight as of this encounter: 82 kg (180 lb 11.2 oz).       Lisa Uribe LPN .......  8/20/2022  10:48 AM  "

## 2022-08-20 NOTE — PROGRESS NOTES
"  Assessment & Plan     Rash  The rash is most consistent with some kind of vector bite.  We discussed possible exposures to triggers mosquitoes.  She has not been outside.  Continue with the hydrocortisone cream.  If getting worse follow-up.  We also discussed this could be an early erythema multiforme but currently does not have a characteristics.                   No follow-ups on file.    Ray Jonas MD  [facility]    Subjective   Floresita is a 52 year old, presenting for the following health issues:  Derm Problem (Rash started on 8-16-22  it is spreading all over now)      Patient arrives here for follow-up rash.  She was recently seen for rash on her left side.  Diagnosed with shingles.  Been beyond 48 hours so she was started on prednisone.  Now she has noticed rashes involving her right arm and left leg.  She reports it is very itchy.  She denies any exposures that she is aware of.             Review of Systems         Objective    /73 (BP Location: Right arm, Patient Position: Sitting, Cuff Size: Adult Regular)   Pulse 65   Temp 97.4  F (36.3  C) (Temporal)   Resp 16   Ht 1.664 m (5' 5.5\")   Wt 82 kg (180 lb 11.2 oz)   LMP 11/01/2020 (Approximate)   SpO2 98%   Breastfeeding No   BMI 29.61 kg/m    Body mass index is 29.61 kg/m .  Physical Exam  Skin:     Comments: She has a couple lesions about 7 to 8 mm.  There is a central wheal with some erythema.  Both on her right upper extremity and left knee.:  Number about 5.  There is no blistering or left shoulder.  But again some erythema present.  Again measuring 3 to 5 mm   Neurological:      Mental Status: She is alert.                            .  ..  "

## 2022-09-02 ENCOUNTER — TRANSFERRED RECORDS (OUTPATIENT)
Dept: HEALTH INFORMATION MANAGEMENT | Facility: OTHER | Age: 52
End: 2022-09-02

## 2022-09-20 ENCOUNTER — MYC MEDICAL ADVICE (OUTPATIENT)
Dept: INTERNAL MEDICINE | Facility: OTHER | Age: 52
End: 2022-09-20

## 2022-09-20 DIAGNOSIS — E10.9 TYPE 1 DIABETES MELLITUS WITHOUT COMPLICATION (H): ICD-10-CM

## 2022-09-21 RX ORDER — INSULIN ASPART 100 [IU]/ML
INJECTION, SOLUTION INTRAVENOUS; SUBCUTANEOUS
Qty: 40 ML | Refills: 4 | Status: SHIPPED | OUTPATIENT
Start: 2022-09-21 | End: 2023-08-16

## 2022-10-22 ENCOUNTER — HEALTH MAINTENANCE LETTER (OUTPATIENT)
Age: 52
End: 2022-10-22

## 2022-11-08 ENCOUNTER — TELEPHONE (OUTPATIENT)
Dept: LAB | Facility: OTHER | Age: 52
End: 2022-11-08

## 2022-11-08 ENCOUNTER — LAB (OUTPATIENT)
Dept: LAB | Facility: OTHER | Age: 52
End: 2022-11-08
Attending: INTERNAL MEDICINE
Payer: COMMERCIAL

## 2022-11-08 DIAGNOSIS — E10.9 TYPE 1 DIABETES MELLITUS WITHOUT COMPLICATION (H): ICD-10-CM

## 2022-11-08 DIAGNOSIS — E06.3 HYPOTHYROIDISM DUE TO HASHIMOTO'S THYROIDITIS: ICD-10-CM

## 2022-11-08 DIAGNOSIS — E10.9 TYPE 1 DIABETES MELLITUS WITHOUT COMPLICATION (H): Primary | ICD-10-CM

## 2022-11-08 DIAGNOSIS — E06.3 HYPOTHYROIDISM DUE TO HASHIMOTO'S THYROIDITIS: Primary | ICD-10-CM

## 2022-11-08 LAB
HBA1C MFR BLD: 6.5 % (ref 4–6.2)
HOLD SPECIMEN: NORMAL
T4 FREE SERPL-MCNC: 1.23 NG/DL (ref 0.9–1.7)
TSH SERPL DL<=0.005 MIU/L-ACNC: 5.99 UIU/ML (ref 0.3–4.2)

## 2022-11-08 PROCEDURE — 84443 ASSAY THYROID STIM HORMONE: CPT | Mod: ZL

## 2022-11-08 PROCEDURE — 36415 COLL VENOUS BLD VENIPUNCTURE: CPT | Mod: ZL

## 2022-11-08 PROCEDURE — 83036 HEMOGLOBIN GLYCOSYLATED A1C: CPT | Mod: ZL

## 2022-11-08 PROCEDURE — 84439 ASSAY OF FREE THYROXINE: CPT | Mod: ZL

## 2022-12-05 ENCOUNTER — OFFICE VISIT (OUTPATIENT)
Dept: PEDIATRICS | Facility: OTHER | Age: 52
End: 2022-12-05
Attending: INTERNAL MEDICINE
Payer: COMMERCIAL

## 2022-12-05 VITALS
WEIGHT: 183.2 LBS | TEMPERATURE: 97.4 F | DIASTOLIC BLOOD PRESSURE: 88 MMHG | BODY MASS INDEX: 30.02 KG/M2 | SYSTOLIC BLOOD PRESSURE: 126 MMHG | RESPIRATION RATE: 20 BRPM | OXYGEN SATURATION: 99 % | HEART RATE: 58 BPM

## 2022-12-05 DIAGNOSIS — M26.621 ARTHRALGIA OF RIGHT TEMPOROMANDIBULAR JOINT: Primary | ICD-10-CM

## 2022-12-05 DIAGNOSIS — G89.29 CHRONIC TMJ PAIN: ICD-10-CM

## 2022-12-05 DIAGNOSIS — E10.9 TYPE 1 DIABETES MELLITUS WITHOUT COMPLICATION (H): ICD-10-CM

## 2022-12-05 DIAGNOSIS — M26.629 CHRONIC TMJ PAIN: ICD-10-CM

## 2022-12-05 PROCEDURE — 99213 OFFICE O/P EST LOW 20 MIN: CPT | Performed by: INTERNAL MEDICINE

## 2022-12-05 ASSESSMENT — PATIENT HEALTH QUESTIONNAIRE - PHQ9
SUM OF ALL RESPONSES TO PHQ QUESTIONS 1-9: 2
10. IF YOU CHECKED OFF ANY PROBLEMS, HOW DIFFICULT HAVE THESE PROBLEMS MADE IT FOR YOU TO DO YOUR WORK, TAKE CARE OF THINGS AT HOME, OR GET ALONG WITH OTHER PEOPLE: NOT DIFFICULT AT ALL
SUM OF ALL RESPONSES TO PHQ QUESTIONS 1-9: 2

## 2022-12-05 ASSESSMENT — ANXIETY QUESTIONNAIRES
GAD7 TOTAL SCORE: 1
4. TROUBLE RELAXING: SEVERAL DAYS
5. BEING SO RESTLESS THAT IT IS HARD TO SIT STILL: NOT AT ALL
GAD7 TOTAL SCORE: 1
IF YOU CHECKED OFF ANY PROBLEMS ON THIS QUESTIONNAIRE, HOW DIFFICULT HAVE THESE PROBLEMS MADE IT FOR YOU TO DO YOUR WORK, TAKE CARE OF THINGS AT HOME, OR GET ALONG WITH OTHER PEOPLE: NOT DIFFICULT AT ALL
3. WORRYING TOO MUCH ABOUT DIFFERENT THINGS: NOT AT ALL
2. NOT BEING ABLE TO STOP OR CONTROL WORRYING: NOT AT ALL
7. FEELING AFRAID AS IF SOMETHING AWFUL MIGHT HAPPEN: NOT AT ALL
8. IF YOU CHECKED OFF ANY PROBLEMS, HOW DIFFICULT HAVE THESE MADE IT FOR YOU TO DO YOUR WORK, TAKE CARE OF THINGS AT HOME, OR GET ALONG WITH OTHER PEOPLE?: NOT DIFFICULT AT ALL
1. FEELING NERVOUS, ANXIOUS, OR ON EDGE: NOT AT ALL
GAD7 TOTAL SCORE: 1
6. BECOMING EASILY ANNOYED OR IRRITABLE: NOT AT ALL
7. FEELING AFRAID AS IF SOMETHING AWFUL MIGHT HAPPEN: NOT AT ALL

## 2022-12-05 ASSESSMENT — PAIN SCALES - GENERAL: PAINLEVEL: SEVERE PAIN (7)

## 2022-12-05 NOTE — LETTER
My Depression Action Plan  Name: Floresita Hill   Date of Birth 1970  Date: 12/5/2022    My doctor: Bety Crowder   My clinic: [facility]  [address]  [place] [id number] [phone]          GREEN    ZONE   Good Control    What it looks like:     Things are going generally well. You have normal ups and downs. You may even feel depressed from time to time, but bad moods usually last less than a day.   What you need to do:  1. Continue to care for yourself (see self care plan)  2. Check your depression survival kit and update it as needed  3. Follow your physician s recommendations including any medication.  4. Do not stop taking medication unless you consult with your physician first.           YELLOW         ZONE Getting Worse    What it looks like:     Depression is starting to interfere with your life.     It may be hard to get out of bed; you may be starting to isolate yourself from others.    Symptoms of depression are starting to last most all day and this has happened for several days.     You may have suicidal thoughts but they are not constant.   What you need to do:     1. Call your care team. Your response to treatment will improve if you keep your care team informed of your progress. Yellow periods are signs an adjustment may need to be made.     2. Continue your self-care.  Just get dressed and ready for the day.  Don't give yourself time to talk yourself out of it.    3. Talk to someone in your support network.    4. Open up your Depression Self-Care Plan/Wellness Kit.           RED    ZONE Medical Alert - Get Help    What it looks like:     Depression is seriously interfering with your life.     You may experience these or other symptoms: You can t get out of bed most days, can t work or engage in other necessary activities, you have trouble taking care of basic hygiene, or basic responsibilities, thoughts of suicide or death  that will not go away, self-injurious behavior.     What you need to do:  1. Call your care team and request a same-day appointment. If they are not available (weekends or after hours) call your local crisis line, emergency room or 911.          Depression Self-Care Plan / Wellness Kit    Many people find that medication and therapy are helpful treatments for managing depression. In addition, making small changes to your everyday life can help to boost your mood and improve your wellbeing. Below are some tips for you to consider. Be sure to talk with your medical provider and/or behavioral health consultant if your symptoms are worsening or not improving.     Sleep   Sleep hygiene  means all of the habits that support good, restful sleep. It includes maintaining a consistent bedtime and wake time, using your bedroom only for sleeping or sex, and keeping the bedroom dark and free of distractions like a computer, smartphone, or television.     Develop a Healthy Routine  Maintain good hygiene. Get out of bed in the morning, make your bed, brush your teeth, take a shower, and get dressed. Don t spend too much time viewing media that makes you feel stressed. Find time to relax each day.    Exercise  Get some form of exercise every day. This will help reduce pain and release endorphins, the  feel good  chemicals in your brain. It can be as simple as just going for a walk or doing some gardening, anything that will get you moving.      Diet  Strive to eat healthy foods, including fruits and vegetables. Drink plenty of water. Avoid excessive sugar, caffeine, alcohol, and other mood-altering substances.     Stay Connected with Others  Stay in touch with friends and family members.    Manage Your Mood  Try deep breathing, massage therapy, biofeedback, or meditation. Take part in fun activities when you can. Try to find something to smile about each day.     Psychotherapy  Be open to working with a therapist if your provider  recommends it.     Medication  Be sure to take your medication as prescribed. Most anti-depressants need to be taken every day. It usually takes several weeks for medications to work. Not all medicines work for all people. It is important to follow-up with your provider to make sure you have a treatment plan that is working for you. Do not stop your medication abruptly without first discussing it with your provider.    Crisis Resources   These hotlines are for both adults and children. They and are open 24 hours a day, 7 days a week unless noted otherwise.      National Suicide Prevention Lifeline   988 or 3-318-928-BTNF (7828)      Crisis Text Line    www.crisistextline.org  Text HOME to 149148 from anywhere in the [place], anytime, about any type of crisis. A live, trained crisis counselor will receive the text and respond quickly.      Yo Lifeline for LGBTQ Youth  A national crisis intervention and suicide lifeline for LGBTQ youth under 25. Provides a safe place to talk without judgement. Call 1-313.849.9628; text START to 765832 or visit www.thetrevorproject.org to talk to a trained counselor.      For [facility] crisis numbers, visit the [facility] website at:  https://[place].gov/dhs/people-we-serve/adults/health-care/mental-health/resources/crisis-contacts.jsp

## 2022-12-05 NOTE — NURSING NOTE
"Chief Complaint   Patient presents with     Otalgia     Right ear pain, TMJ         Initial /88   Pulse 58   Temp 97.4  F (36.3  C) (Tympanic)   Resp 20   Wt 83.1 kg (183 lb 3.2 oz)   LMP 11/01/2020 (Approximate)   SpO2 99%   Breastfeeding No   BMI 30.02 kg/m   Estimated body mass index is 30.02 kg/m  as calculated from the following:    Height as of 8/20/22: 1.664 m (5' 5.5\").    Weight as of this encounter: 83.1 kg (183 lb 3.2 oz).         Norma J. Gosselin, DURGA   "
declines

## 2022-12-05 NOTE — PROGRESS NOTES
Assessment & Plan   1. Arthralgia of right temporomandibular joint  2. Chronic TMJ pain  3. Type 1 diabetes mellitus without complication (H)  I think the most likely etiology is acute on chronic TMJ pain however differential diagnosis also includes infectious arthritis of TMJ, temporal arteritis, otitis externa, others.  Her exam appears normal today.  Reassurance was provided.  Warning signs discussed return if concerns      Patient Instructions    -- Try Voltaren/diclofenac gel   -- Return to the dentist      Return if symptoms worsen or fail to improve.    Signed, Eugene Chacko MD, FAAP, FACP  Internal Medicine & Pediatrics    Subjective   Floresita Hill is a 52 year old female who presents for evaluation of right-sided ear pain.  She has had TMJ since she was a teenager.  She has even had surgery for this.  Starting on Friday she developed a significant increase in pain in the right ear.  She was worried she may have an infection and wanted to have it checked out.  She does feel like she is getting a cold.  No fever.  No body ache.  No cough.  No tooth pain.  No temple pain.  No double or blurry vision.  No recent swimming.  No submerging in a bathtub.  Worse with talking or chewing and touching the external ear and better with ibuprofen, Bengay or warm compresses.    Objective   Vitals: /88   Pulse 58   Temp 97.4  F (36.3  C) (Tympanic)   Resp 20   Wt 83.1 kg (183 lb 3.2 oz)   LMP 11/01/2020 (Approximate)   SpO2 99%   Breastfeeding No   BMI 30.02 kg/m      HEENT: Tympanic membranes are normal bilaterally, small amount of clear fluid on the right.  External canal normal on the right.  No discharge or drainage.  There is tenderness to palpation of the right tragus.  There is tenderness in front of the tragus over the TMJ without crepitus.  No clicking or catching appreciated with opening and closing of jaw however pain is elicited.  The teeth are not tender to percussion with a  cotton swab on the right posterior maxilla or mandible.

## 2022-12-13 ENCOUNTER — MYC MEDICAL ADVICE (OUTPATIENT)
Dept: INTERNAL MEDICINE | Facility: OTHER | Age: 52
End: 2022-12-13

## 2022-12-13 DIAGNOSIS — E06.3 HYPOTHYROIDISM DUE TO HASHIMOTO'S THYROIDITIS: Primary | ICD-10-CM

## 2022-12-16 RX ORDER — LEVOTHYROXINE SODIUM 125 UG/1
125 TABLET ORAL
Qty: 90 TABLET | Refills: 0 | Status: SHIPPED | OUTPATIENT
Start: 2022-12-16 | End: 2023-03-14

## 2023-03-07 ENCOUNTER — LAB (OUTPATIENT)
Dept: LAB | Facility: OTHER | Age: 53
End: 2023-03-07
Attending: INTERNAL MEDICINE
Payer: COMMERCIAL

## 2023-03-07 DIAGNOSIS — E06.3 HYPOTHYROIDISM DUE TO HASHIMOTO'S THYROIDITIS: ICD-10-CM

## 2023-03-07 LAB
HOLD SPECIMEN: NORMAL
TSH SERPL DL<=0.005 MIU/L-ACNC: 1.21 UIU/ML (ref 0.3–4.2)

## 2023-03-07 PROCEDURE — 36415 COLL VENOUS BLD VENIPUNCTURE: CPT | Mod: ZL

## 2023-03-07 PROCEDURE — 84443 ASSAY THYROID STIM HORMONE: CPT | Mod: ZL

## 2023-03-14 DIAGNOSIS — E06.3 HYPOTHYROIDISM DUE TO HASHIMOTO'S THYROIDITIS: ICD-10-CM

## 2023-03-14 RX ORDER — LEVOTHYROXINE SODIUM 125 UG/1
125 TABLET ORAL
Qty: 90 TABLET | Refills: 1 | Status: SHIPPED | OUTPATIENT
Start: 2023-03-14 | End: 2023-10-02

## 2023-03-22 ENCOUNTER — MYC MEDICAL ADVICE (OUTPATIENT)
Dept: INTERNAL MEDICINE | Facility: OTHER | Age: 53
End: 2023-03-22
Payer: COMMERCIAL

## 2023-03-30 ENCOUNTER — OFFICE VISIT (OUTPATIENT)
Dept: FAMILY MEDICINE | Facility: OTHER | Age: 53
End: 2023-03-30
Attending: STUDENT IN AN ORGANIZED HEALTH CARE EDUCATION/TRAINING PROGRAM
Payer: COMMERCIAL

## 2023-03-30 VITALS
WEIGHT: 188 LBS | TEMPERATURE: 97.3 F | DIASTOLIC BLOOD PRESSURE: 78 MMHG | HEIGHT: 66 IN | BODY MASS INDEX: 30.22 KG/M2 | HEART RATE: 77 BPM | OXYGEN SATURATION: 98 % | RESPIRATION RATE: 20 BRPM | SYSTOLIC BLOOD PRESSURE: 130 MMHG

## 2023-03-30 DIAGNOSIS — N30.00 ACUTE CYSTITIS WITHOUT HEMATURIA: ICD-10-CM

## 2023-03-30 DIAGNOSIS — R21 RASH: ICD-10-CM

## 2023-03-30 DIAGNOSIS — Z13.9 SCREENING FOR CONDITION: ICD-10-CM

## 2023-03-30 DIAGNOSIS — E10.9 TYPE 1 DIABETES MELLITUS WITHOUT COMPLICATION (H): Primary | ICD-10-CM

## 2023-03-30 LAB
ALBUMIN SERPL BCG-MCNC: 4.1 G/DL (ref 3.5–5.2)
ALBUMIN UR-MCNC: NEGATIVE MG/DL
ALP SERPL-CCNC: 83 U/L (ref 35–104)
ALT SERPL W P-5'-P-CCNC: 17 U/L (ref 10–35)
ANION GAP SERPL CALCULATED.3IONS-SCNC: 7 MMOL/L (ref 7–15)
APPEARANCE UR: CLEAR
AST SERPL W P-5'-P-CCNC: 19 U/L (ref 10–35)
BACTERIA #/AREA URNS HPF: ABNORMAL /HPF
BILIRUB DIRECT SERPL-MCNC: <0.2 MG/DL (ref 0–0.3)
BILIRUB SERPL-MCNC: 0.2 MG/DL
BILIRUB SERPL-MCNC: 0.2 MG/DL
BILIRUB UR QL STRIP: NEGATIVE
BUN SERPL-MCNC: 16 MG/DL (ref 6–20)
CALCIUM SERPL-MCNC: 9.6 MG/DL (ref 8.6–10)
CHLORIDE SERPL-SCNC: 102 MMOL/L (ref 98–107)
COLOR UR AUTO: ABNORMAL
CREAT SERPL-MCNC: 0.79 MG/DL (ref 0.51–0.95)
DEPRECATED HCO3 PLAS-SCNC: 29 MMOL/L (ref 22–29)
GFR SERPL CREATININE-BSD FRML MDRD: 88 ML/MIN/1.73M2
GLUCOSE SERPL-MCNC: 137 MG/DL (ref 70–99)
GLUCOSE UR STRIP-MCNC: NEGATIVE MG/DL
HGB UR QL STRIP: NEGATIVE
HOLD SPECIMEN: NORMAL
KETONES UR STRIP-MCNC: NEGATIVE MG/DL
LEUKOCYTE ESTERASE UR QL STRIP: ABNORMAL
MUCOUS THREADS #/AREA URNS LPF: PRESENT /LPF
NITRATE UR QL: NEGATIVE
PH UR STRIP: 5.5 [PH] (ref 5–9)
POTASSIUM SERPL-SCNC: 4.1 MMOL/L (ref 3.4–5.3)
PROT SERPL-MCNC: 6.8 G/DL (ref 6.4–8.3)
RBC URINE: 1 /HPF
SODIUM SERPL-SCNC: 138 MMOL/L (ref 136–145)
SP GR UR STRIP: 1.02 (ref 1–1.03)
UROBILINOGEN UR STRIP-MCNC: NORMAL MG/DL
WBC URINE: 6 /HPF

## 2023-03-30 PROCEDURE — 81003 URINALYSIS AUTO W/O SCOPE: CPT | Mod: ZL | Performed by: STUDENT IN AN ORGANIZED HEALTH CARE EDUCATION/TRAINING PROGRAM

## 2023-03-30 PROCEDURE — 36415 COLL VENOUS BLD VENIPUNCTURE: CPT | Mod: ZL | Performed by: STUDENT IN AN ORGANIZED HEALTH CARE EDUCATION/TRAINING PROGRAM

## 2023-03-30 PROCEDURE — 80053 COMPREHEN METABOLIC PANEL: CPT | Mod: ZL | Performed by: STUDENT IN AN ORGANIZED HEALTH CARE EDUCATION/TRAINING PROGRAM

## 2023-03-30 PROCEDURE — 82248 BILIRUBIN DIRECT: CPT | Mod: ZL | Performed by: STUDENT IN AN ORGANIZED HEALTH CARE EDUCATION/TRAINING PROGRAM

## 2023-03-30 PROCEDURE — 87186 SC STD MICRODIL/AGAR DIL: CPT | Mod: ZL | Performed by: STUDENT IN AN ORGANIZED HEALTH CARE EDUCATION/TRAINING PROGRAM

## 2023-03-30 PROCEDURE — 99213 OFFICE O/P EST LOW 20 MIN: CPT | Performed by: STUDENT IN AN ORGANIZED HEALTH CARE EDUCATION/TRAINING PROGRAM

## 2023-03-30 ASSESSMENT — PAIN SCALES - GENERAL: PAINLEVEL: SEVERE PAIN (6)

## 2023-03-30 ASSESSMENT — PATIENT HEALTH QUESTIONNAIRE - PHQ9
10. IF YOU CHECKED OFF ANY PROBLEMS, HOW DIFFICULT HAVE THESE PROBLEMS MADE IT FOR YOU TO DO YOUR WORK, TAKE CARE OF THINGS AT HOME, OR GET ALONG WITH OTHER PEOPLE: SOMEWHAT DIFFICULT
SUM OF ALL RESPONSES TO PHQ QUESTIONS 1-9: 3
SUM OF ALL RESPONSES TO PHQ QUESTIONS 1-9: 3

## 2023-03-30 NOTE — NURSING NOTE
Patient presents to clinic for establish care appointment with Gustavo Mckay MD.   Also, diabetic check.    Medication Rec Complete  Francheska Stubbs LPN............3/30/2023 3:39 PM

## 2023-03-30 NOTE — PROGRESS NOTES
"  Assessment & Plan     Type 1 diabetes mellitus without complication (H)  Needs new Rx for test strips and referral to endocrine  - blood glucose (CONTOUR NEXT TEST) test strip; 100 strips by In Vitro route 4 times daily (before meals and nightly) Use to test blood sugar 4 times daily before meals and in the morning or as directed.  - Adult Endocrinology  Referral; Future  - Extra SST Tube (LAB USE ONLY)    Screening for condition  Fizzy urine. Will check UA to assess for protein and infection. Unclear cause at this point   - UA reflex to Microscopic and Culture; Future  - UA reflex to Microscopic and Culture    Rash  Could be contact dermatitis vs fungal dermatitis vs folliculitis. Given how wide spread it is and intermittent itching, will check liver/bilirubin levels. Ok to continue using steroid cream as needed. Consider biopsy for further assessment   - Comprehensive Metabolic Panel; Future  - Bilirubin, Total and Direct; Future  - Comprehensive Metabolic Panel  - Bilirubin, Total and Direct             BMI:   Estimated body mass index is 30.81 kg/m  as calculated from the following:    Height as of this encounter: 1.664 m (5' 5.5\").    Weight as of this encounter: 85.3 kg (188 lb).           No follow-ups on file.    Gustavo Marvin MD  [facility]    Subjective   Floresita is a 52 year old, presenting for the following health issues:  Establish Care (With Gustavo Mckay MD, diabetic check up)  No flowsheet data found.  History of Present Illness       Reason for visit:  Urine concerns and skin concerns  Symptom onset:  3-4 weeks ago  Symptoms include:  Fizzy urine (3 weeks) Dry, patchy and hibes on skin (several months)  Symptom intensity:  Moderate  Symptom progression:  Staying the same  Had these symptoms before:  Yes  Has tried/received treatment for these symptoms:  Yes  Previous treatment was successful:  No  What makes it worse:  Hives- clothing and " "heat    She eats 2-3 servings of fruits and vegetables daily.She consumes 0 sweetened beverage(s) daily.She exercises with enough effort to increase her heart rate 30 to 60 minutes per day.  She exercises with enough effort to increase her heart rate 5 days per week. She is missing 1 dose(s) of medications per week.  She is not taking prescribed medications regularly due to remembering to take.    Today's PHQ-9         PHQ-9 Total Score: 3    PHQ-9 Q9 Thoughts of better off dead/self-harm past 2 weeks :   Not at all    How difficult have these problems made it for you to do your work, take care of things at home, or get along with other people: Somewhat difficult     1. Itchy skin rash  - all over, arms, abdomen, trunk/chest.   - improves with topical steroid cream.   - has not found a trigger      2. Fizzy urine  - mom had a rare kidney disease and had fizzy urine  - intermittent urine changes, comes and goes  - increased water intake with no change  - no UTI symptoms     3. Endocrinology referral  - needs to establish with a new endocrinologist to establish care for pump              Review of Systems   Constitutional, HEENT, cardiovascular, pulmonary, gi and gu systems are negative, except as otherwise noted.      Objective    /78 (BP Location: Right arm, Patient Position: Sitting, Cuff Size: Adult Large)   Pulse 77   Temp 97.3  F (36.3  C) (Tympanic)   Resp 20   Ht 1.664 m (5' 5.5\")   Wt 85.3 kg (188 lb)   LMP 11/01/2020 (Approximate)   SpO2 98%   BMI 30.81 kg/m    Body mass index is 30.81 kg/m .  Physical Exam   GENERAL: healthy, alert and no distress  EYES: Eyes grossly normal to inspection  HENT: normal cephalic/atraumatic and ear canals and TM's normal  RESP: lungs clear to auscultation - no rales, rhonchi or wheezes  CV: regular rate and rhythm, normal S1 S2, no S3 or S4, no murmur, click or rub, no peripheral edema and peripheral pulses strong  ABDOMEN: soft, nontender, no hepatosplenomegaly, " no masses and bowel sounds normal  SKIN: scattered clusters of red pinpoint macules.   BACK: no CVA tenderness  PSYCH: mentation appears normal, affect normal/bright

## 2023-04-01 LAB — BACTERIA UR CULT: ABNORMAL

## 2023-04-03 RX ORDER — NITROFURANTOIN 25; 75 MG/1; MG/1
100 CAPSULE ORAL 2 TIMES DAILY
Qty: 10 CAPSULE | Refills: 0 | Status: SHIPPED | OUTPATIENT
Start: 2023-04-03 | End: 2023-04-08

## 2023-04-28 ENCOUNTER — HOSPITAL ENCOUNTER (OUTPATIENT)
Dept: MAMMOGRAPHY | Facility: OTHER | Age: 53
Discharge: HOME | End: 2023-04-28
Attending: INTERNAL MEDICINE | Admitting: INTERNAL MEDICINE
Payer: COMMERCIAL

## 2023-04-28 DIAGNOSIS — Z12.31 VISIT FOR SCREENING MAMMOGRAM: ICD-10-CM

## 2023-04-28 PROCEDURE — 77067 SCR MAMMO BI INCL CAD: CPT

## 2023-05-09 ENCOUNTER — OFFICE VISIT (OUTPATIENT)
Dept: SURGERY | Facility: OTHER | Age: 53
End: 2023-05-09
Attending: SURGERY
Payer: COMMERCIAL

## 2023-05-09 ENCOUNTER — PATIENT OUTREACH (OUTPATIENT)
Dept: ONCOLOGY | Facility: CLINIC | Age: 53
End: 2023-05-09
Payer: COMMERCIAL

## 2023-05-09 VITALS
TEMPERATURE: 96.8 F | WEIGHT: 177 LBS | RESPIRATION RATE: 16 BRPM | DIASTOLIC BLOOD PRESSURE: 96 MMHG | BODY MASS INDEX: 29.01 KG/M2 | OXYGEN SATURATION: 98 % | SYSTOLIC BLOOD PRESSURE: 152 MMHG | HEART RATE: 56 BPM

## 2023-05-09 DIAGNOSIS — Z91.89 AT HIGH RISK FOR BREAST CANCER: ICD-10-CM

## 2023-05-09 DIAGNOSIS — Z80.3 FAMILY HISTORY OF BREAST CANCER IN SISTER: Primary | ICD-10-CM

## 2023-05-09 PROCEDURE — 99204 OFFICE O/P NEW MOD 45 MIN: CPT | Performed by: SURGERY

## 2023-05-09 ASSESSMENT — PAIN SCALES - GENERAL: PAINLEVEL: NO PAIN (0)

## 2023-05-09 NOTE — PROGRESS NOTES
Writer received referral to Cancer Risk Management/Genetic Counseling.    Referred for: family history of breast cancer, sister, maternal aunt, maternal cousin.     Referral reviewed for appropriate plan, and sent to New Patient Scheduling for completion.    Naila Montana RN, BSN  Oncology New Patient Nurse Navigator   [facility] [place]  [phone]

## 2023-05-09 NOTE — PATIENT INSTRUCTIONS
You will get a call to schedule genetics virtual visit. We will watch for that visit and any results. If there are findings that will change your screenings, we will visit.   You will a call to schedule MRI in November.   If you think of a question or have a concern, please let us know!

## 2023-05-09 NOTE — NURSING NOTE
"Chief Complaint   Patient presents with     Consult     Risk eval for breast cancer       Initial BP (!) 162/90 (BP Location: Right arm, Patient Position: Sitting, Cuff Size: Adult Regular)   Pulse 56   Temp 96.8  F (36  C) (Tympanic)   Resp 16   Wt 80.3 kg (177 lb)   LMP 11/01/2020 (Approximate)   SpO2 98%   BMI 29.01 kg/m   Estimated body mass index is 29.01 kg/m  as calculated from the following:    Height as of 3/30/23: 1.664 m (5' 5.5\").    Weight as of this encounter: 80.3 kg (177 lb).  Medication Reconciliation: complete    At what age did you start menopause? Mid 40's  What age did your menstrual cycle start? 12  Are you on or have you ever taken any hormone replacement or birth control? no  How many children do you have? 2  How old were you when your first child was born? 28  Did you breast feed? yes  Do you have a family history of breast cancer? Sister- age 48, maternal aunt and niece  Carrie Johnson LPN..........5/9/2023  9:47 AM    "

## 2023-05-09 NOTE — PROGRESS NOTES
Primary Care Physician: Gustavo Marvin MD      A copy of this note will be sent to Gustavo Marvin MD.    HPI:   The patient is a 53 year old female  with a recent screening mammogram showing heterogeneously dense breast tissue. She hasn't noted any new lumps in her breasts or arm pits. She has not had breast pain. She has no nipple drainage bilaterally. She has not noted any new skin lesions on the breasts. She has had one previous left breast biopsy in 2019-proliferative fibrocystic changes noted with no atypia or malignancy. She has family history of breast cancer : in her sister at age 48, Maternal aunt in her 40's, maternal cousin in her 40's. She has at least on maternal uncle with prostate cancer as well. She has no personal history of breast cancer or other cancer.      CONSULTATION ASSESSMENT AND PLAN/RECOMMENDATIONS:   I discussed with the patient that family history can increase her lifetime risk for breast cancer. We discussed current NCCN guidelines for high risk screening and surveillance in patients with a greater than 20 % lifetime risk of breast cancer. We discussed doing yearly breast MRI and yearly mammograms, alternating the studies every 6 months. She will be due for MRI in November 2023 and mammogram again in May 2024.  We discussed the option of a genetic evaluation for further information about her breast cancer risk and the risks she may have for other cancers. The patient expressed understanding and denies further questions at this time. She would like to proceed with genetic counseling referral. We will watch for that visit and any results. She will meet with us after the results for further discussion of high risk management. The patient will call with questions or concerns.     REVIEW OF SYSTEMS  GENERAL: No fevers or chills. Denies fatigue, recent weight loss.  HEENT: No sinus drainage. No changes with vision or hearing. No difficulty swallowing.   LYMPHATICS:  No swollen  nodes in axilla, neck orgroin.  CARDIOVASCULAR: Denies chest pain, palpitations and dyspnea on exertion.  PULMONARY: No increased shortness of breath or cough. No increase in sputum production.  GI: Denies melena, bright red blood in stools. No hematemesis. No constipation or diarrhea.  : No dysuria or hematuria.  SKIN: No recent rashes or ulcers. Chronic scabbing of left nipple for years.  HEMATOLOGY:  No history of easy bruising or bleeding.  ENDOCRINE:  Has diabetes on insulin, has hashimoto's. Last A1C was 6.5 11/2022.  NEUROLOGY:  No history of seizures or headaches. No motor or sensory changes.  BREASTS: As above.    Past Medical History:   Diagnosis Date     DDD (degenerative disc disease), lumbar      Dysthymic disorder      Hashimoto's thyroiditis 03/29/2015     Iron deficiency anemia 11/16/2012     Type 1 diabetes mellitus without complications (H)     Dx at 10 years old,Uses Medtronic Insulin pump.     Past Surgical History:   Procedure Laterality Date     ARTHROTOMY TEMPOROMANDIBULAR JOINT  1985     BACK SURGERY  07/2020    hemilaminotomies     BREAST BIOPSY, RT/LT Left 04/12/2019    benign     COLONOSCOPY  05/24/2019    normal results, follow up 10 years     COLONOSCOPY N/A 05/24/2019    Procedure: COLONOSCOPY;  Surgeon: Maria Victoria Dubose MD;  Location: [facility] OR     DILATION AND CURETTAGE      x3; benign     Current Outpatient Medications   Medication     aspirin EC 81 MG EC tablet     blood glucose (CONTOUR NEXT TEST) test strip     Blood Glucose Monitoring Suppl (SURECHEK BLOOD GLUCOSE MONITOR) W/DEVICE KIT     Continuous Blood Gluc Sensor (DEXCOM G6 SENSOR) MISC     Continuous Blood Gluc Sensor (DEXCOM G6 SENSOR) MISC     Continuous Blood Gluc Transmit (DEXCOM G6 TRANSMITTER) MISC     Continuous Blood Gluc Transmit (DEXCOM G6 TRANSMITTER) MISC     FLUoxetine (PROZAC) 10 MG capsule     insulin aspart (NOVOLOG VIAL) 100 UNITS/ML vial     insulin glargine (LANTUS PEN) 100 UNIT/ML pen     insulin pen needle  "(B-D U/F) 31G X 5 MM miscellaneous     insulin syringe-needle U-100 (29G X 1/2\" 0.5 ML) 29G X 1/2\" 0.5 ML miscellaneous     levothyroxine (SYNTHROID/LEVOTHROID) 125 MCG tablet     triamcinolone (KENALOG) 0.1 % external cream     No current facility-administered medications for this visit.     Allergies   Allergen Reactions     Codeine Unknown     Loses consciousness     Statins Muscle Pain (Myalgia)     Mild achiness, may try again in future     Family History   Problem Relation Age of Onset     Kidney Disease Mother         s/p transplant      Myasthenia gravis Mother      Lung Cancer Mother      Hypothyroidism Sister      Bipolar Disorder Sister      Migraines Sister      Breast Cancer Sister      Hearing Loss Daughter         bilateral     Other - See Comments Daughter         asd spectrum     Albinism Daughter      Anxiety Disorder Daughter      Anxiety Disorder Daughter      Breast Cancer Maternal Aunt         Cancer-breast     Social History     Socioeconomic History     Marital status:      Spouse name: None     Number of children: None     Years of education: None     Highest education level: None   Tobacco Use     Smoking status: Never     Smokeless tobacco: Never   Vaping Use     Vaping status: Never Used   Substance and Sexual Activity     Alcohol use: Yes     Alcohol/week: 2.0 standard drinks of alcohol     Types: 2 Glasses of wine per week     Comment: reduced, maybe one or two drinks a week     Drug use: No     Sexual activity: Not Currently     Partners: Male   Social History Narrative    , moved here from [place].   Sense of humor intact.   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  Eugene - .  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  Works in [employer] at the gra[employer].       The above history was reviewed and updated today, 5/9/2023    PHYSICAL EXAM  BP (!) 152/96 (BP Location: Right arm, Patient " Position: Sitting, Cuff Size: Adult Regular)   Pulse 56   Temp 96.8  F (36  C) (Tympanic)   Resp 16   Wt 80.3 kg (177 lb)   LMP 11/01/2020 (Approximate)   SpO2 98%   BMI 29.01 kg/m    GENERAL: Healthy appearing patient in noacute distress. Pleasant and cooperative with exam and interview.   HEENT: Head-normocephalic. Eyes-no scleral icterus, pupils equal, round, and reactive to light. Nose-no nasal drainage. No lesions. Mouth-oral mucosapink and moist, no lesions.  NECK: Supple. No thyroid nodules. Trachea midline.  LYMPHATICS:  No cervical, axillary or supraclavicular adenopathy.  BREASTS: The breasts were examined bilaterally- No asymmetry noted. Bilateral areas of dense breat tissue noted on palpation. No nipple discharge or inversion noted. Scabbing noted on left nipple. No breast skin changes noted. No palpable masses noted. No tenderness on exam.  CV: Regular rate and rhythm, no murmurs. No peripheral edema.  LUNGS:  No respiratory distress. Clear bilaterally to auscultation.  SKIN: Pink, warm and dry. No jaundice. No rash.  NEURO:  Cranial nerves II-XII grossly intact. Alert and oriented.  PSYCH: Appropriate mood and affect.    IMAGING/LAB  I personally reviewed patient's recent mammogram images and reports.

## 2023-06-01 ENCOUNTER — HEALTH MAINTENANCE LETTER (OUTPATIENT)
Age: 53
End: 2023-06-01

## 2023-06-26 DIAGNOSIS — E10.9 TYPE 1 DIABETES MELLITUS WITHOUT COMPLICATION (H): ICD-10-CM

## 2023-06-29 RX ORDER — PROCHLORPERAZINE 25 MG/1
SUPPOSITORY RECTAL
Qty: 1 EACH | Refills: 3 | Status: SHIPPED | OUTPATIENT
Start: 2023-06-29

## 2023-06-29 RX ORDER — PROCHLORPERAZINE 25 MG/1
SUPPOSITORY RECTAL
Qty: 3 EACH | Refills: 11 | Status: SHIPPED | OUTPATIENT
Start: 2023-06-29 | End: 2024-06-26

## 2023-06-29 NOTE — TELEPHONE ENCOUNTER
[facility] sent Rx request for the following:    Dexcom G6 Transmitter device  Dexcom G6 Sensor device  Last Prescription Date:   7/14/22  Last Fill Qty/Refills:         1, 3 R-3, 11    Last Office Visit:              3/30/23   Future Office visit:           None     Angelica Forman RN on 6/29/2023 at 9:15 AM

## 2023-07-01 ENCOUNTER — TRANSFERRED RECORDS (OUTPATIENT)
Dept: MULTI SPECIALTY CLINIC | Facility: CLINIC | Age: 53
End: 2023-07-01

## 2023-07-01 LAB — RETINOPATHY: NORMAL

## 2023-08-13 DIAGNOSIS — E10.9 TYPE 1 DIABETES MELLITUS WITHOUT COMPLICATION (H): ICD-10-CM

## 2023-08-16 RX ORDER — INSULIN ASPART 100 [IU]/ML
INJECTION, SOLUTION INTRAVENOUS; SUBCUTANEOUS
Qty: 40 ML | Refills: 4 | Status: SHIPPED | OUTPATIENT
Start: 2023-08-16 | End: 2023-11-06

## 2023-08-16 NOTE — TELEPHONE ENCOUNTER
"      Disp Refills Start End ALYSA    insulin aspart (NOVOLOG VIAL) 100 UNITS/ML vial 40 mL 4 9/21/2022  No   Sig: Use as directed with insulin pump; max daily dose of 45 units   Sent to pharmacy as: Insulin Aspart 100 UNIT/ML Injection Solution (NovoLOG VIAL)   Class: E-Prescribe       Last Office Visit: 03/30/2023  Future Office visit:       Requested Prescriptions   Pending Prescriptions Disp Refills    NOVOLOG VIAL 100 UNIT/ML soln [Pharmacy Med Name: Novolog U-100 Insulin aspart 100 unit/mL subcutaneous solution] 40 mL 4     Sig: Use as directed with insulin pump; max daily dose of 45 units       Short Acting Insulin Protocol Failed - 8/13/2023  8:04 AM        Failed - HgbA1C in past 3 or 6 months     If HgbA1C is 8 or greater, it needs to be on file within the past 3 months.  If less than 8, must be on file within the past 6 months.     Recent Labs   Lab Test 11/08/22  1519 03/02/18  0802 07/05/16  1706   A1C 6.5*   < >  --    [id number]  --   --  7.0*    < > = values in this interval not displayed.             Failed - Recent (6 mo) or future (30 days) visit within the authorizing provider's specialty     Patient had office visit in the last 6 months or has a visit in the next 30 days with authorizing provider or within the authorizing provider's specialty.  See \"Patient Info\" tab in inbasket, or \"Choose Columns\" in Meds & Orders section of the refill encounter.            Passed - Serum creatinine on file in past 12 months     Recent Labs   Lab Test 03/30/23  1607   CR 0.79       Ok to refill medication if creatinine is low          Passed - Medication is active on med list        Passed - Patient is age 18 or older                 Routing refill request to provider for review/approval.     Unable to complete prescription refill per RNMedication Refill Policy.................... Anca Brennan RN ....................  8/16/2023   2:42 PM        " Nursing Note by Liz Castellano NCMA at 17 12:38 PM     Author:  Liz Castellano NCMA Service:  (none) Author Type:  Certified Medical Assistant     Filed:  17 12:38 PM Encounter Date:  2017 Status:  Signed     :  Liz Castellano NCMA (Certified Medical Assistant)            12:38 PM  Chief Complaint     Patient presents with     • Cough    • Sinus Drainage    • Headache      x 2 1/2 weeks      Patient brought to exam room.  Electronically Signed by:    WINNIE Alarcon , 2017  Patient's name,  and allergies verified with[TT1.1T] patient[TT1.1M].  Last visit with WEILER, KEITH E. was on 2001 at  5:30 PM in [facility]  Last visit with [facility] was on 2016 at  9:30 AM in [facility]  Match done based on reference date of today 17  Hali Chou was offered treatment for pain control:[TT1.1T] No.   Waiting for MD to assess[TT1.1M]         Revision History        User Key Date/Time User Provider Type Action    > TT1.1 17 12:38 PM Liz Castellano NCMA Certified Medical Assistant Sign    M - Manual, T - Template

## 2023-08-28 ENCOUNTER — MYC MEDICAL ADVICE (OUTPATIENT)
Dept: FAMILY MEDICINE | Facility: OTHER | Age: 53
End: 2023-08-28
Payer: COMMERCIAL

## 2023-08-31 ENCOUNTER — PATIENT OUTREACH (OUTPATIENT)
Dept: FAMILY MEDICINE | Facility: OTHER | Age: 53
End: 2023-08-31
Payer: COMMERCIAL

## 2023-08-31 NOTE — TELEPHONE ENCOUNTER
Patient Quality Outreach    Patient is due for the following:   Diabetes -  Eye Exam    Next Steps:   Patient was informed that they are due for their diabetic eye exam and if they have had a diabetic eye exam in the last 12 months to please contact the clinic and update that information.    Type of outreach:    Sent letter.      Questions for provider review:    None           Regine Verduzco on 8/31/2023 at 1:32 PM

## 2023-08-31 NOTE — LETTER
[facility]  [address]  [place] [id number] [phone]       August 31, 2023    Floresita Hill  [address] [facility] [address]    Dear Floresita,    We care about your health and have reviewed your health plan and are making recommendations based on this review, to optimize your health.    You are in particular need of attention regarding:  -Diabetes    We are recommending that you:  -Diabetic Eye Exam is due.  If this was done within the last 12 months then please contact the clinic with the date and location so we can update your records.    In addition, here is a list of due or overdue Health Maintenance reminders.    Health Maintenance Due   Topic Date Due    Pneumococcal Vaccine (2 - PCV) 03/02/2019    Hepatitis B Vaccine (2 of 3 - Hep B Twinrix 3-dose series) 07/23/2021    Eye Exam  10/08/2022    HPV Screening  03/02/2023    PAP Smear  03/02/2023    A1C Lab  05/08/2023    Cholesterol Lab  08/02/2023    Kidney Microalbumin Urine Test  08/02/2023    Diabetic Foot Exam  08/02/2023    Yearly Preventive Visit  08/02/2023    Depression Assessment  09/30/2023       To address the above recommendations, we encourage you to contact us at [phone]. They will assist you with finding the most convenient time and location.    Thank you for trusting [facility] and we appreciate the opportunity to serve you.  We look forward to supporting your healthcare needs in the future.    Healthy Regards,    Your [facility] Team

## 2023-09-01 ENCOUNTER — VIRTUAL VISIT (OUTPATIENT)
Dept: ONCOLOGY | Facility: CLINIC | Age: 53
End: 2023-09-01
Attending: SURGERY
Payer: COMMERCIAL

## 2023-09-01 DIAGNOSIS — Z80.3 FAMILY HISTORY OF MALIGNANT NEOPLASM OF BREAST: Primary | ICD-10-CM

## 2023-09-01 DIAGNOSIS — Z80.0 FAMILY HISTORY OF MALIGNANT NEOPLASM OF COLON: ICD-10-CM

## 2023-09-01 DIAGNOSIS — Z84.81 FAMILY HISTORY OF CARRIER OF GENETIC DISEASE: ICD-10-CM

## 2023-09-01 PROCEDURE — 96040 HC GENETIC COUNSELING, EACH 30 MINUTES: CPT | Mod: GT,95 | Performed by: GENETIC COUNSELOR, MS

## 2023-09-01 NOTE — NURSING NOTE
Is the patient currently in the state of [place]? YES    Visit mode:VIDEO    If the visit is dropped, the patient can be reconnected by: VIDEO VISIT: Text to cell phone:   Telephone Information:   Mobile [phone]       Will anyone else be joining the visit? NO  (If patient encounters technical issues they should call [phone]    How would you like to obtain your AVS? MyChart    Are changes needed to the allergy or medication list? N/A    Reason for visit: Consult    Linda COLLINS

## 2023-09-01 NOTE — PROGRESS NOTES
9/1/2023    Referring Provider: Maria Victoria Dubose MD    Presenting Information:   I met with Floresita for her video genetic counseling visit, through the Cancer Risk Management Program, to discuss her family history of breast, colon, and prostate cancer. Today we reviewed this history, cancer screening recommendations, and available genetic testing options.    Personal History:  Floresita is a 53 year old year old female. She does not have any personal history of cancer. She had her first menstrual period at age 12, her first child at age 28, and completed menopause at age 49. Floresita has her ovaries, fallopian tubes and uterus in place, and she has had no ovarian cancer screening to date. She reports that she has not used hormone replacement therapy.      She has annual clinical breast exams and mammograms; her most recent mammogram in April 2023 was normal. Floresita began having colonoscopies at the age of 49. Her most recent colonoscopy was reported to be in 2019 and was normal. Follow-up was recommended in 10 years. She does not regularly do any other cancer screening at this time.     Family History: (Please see scanned pedigree for detailed family history information)  Floresita's sister was diagnosed with breast cancer at age 48. She had genetic testing and was found to a mutation in HOXB13. Specifically, this mutation is c.251G>A (p.[id number]). She also tested negative for 83 other genes: AIP, ALK, APC, RANJEET, AXIN2, BAP1, BARD1, BLM, BMPR1A, BRCA1, BRCA2, BRIP1, CASR, CDC73, CDH1, CDK4, CDKN1B, CDKN1C, CDKN2A, CEBPA, CHEK2, CTNNA1, DICER1, DIS3L2, EGFR, EPCAM, FH, FLCN, GATA2, GPC3, GREM1, HRAS, KIT, MAX, MEN1, MET, MITF, MLH1, MSH2, MSH3, MSH6, MUTYH, NBN, NF1, NF2, NTHL1, PALB2, PHOX2B, PMS2, POLD1, POLE, POT1, LMPUL7T, PTCH1, PTEN, RAD50, RAD51C, RAD51D, RB1, RECQL4, RET, RUNX1, SDHA, SDHAF2, SDHB, SDHC, SDHD, SMAD4, SMARCA4, SMARCB1, SMARCE1, STK11, SUFU, TERC, TERT, BFGL792, TP53, TSC1, TSC2, VHL, WRN, and  WT1 genes.  Floresita's mother was diagnosed lung cancer at age 69 and  at age 70. She is reported to have a history of smoking.  A maternal uncle was diagnosed with lung cancer at age 58 and  at age 67. He is thought to have a history of smoking.  A maternal aunt was diagnosed with breast cancer at age 40.  A maternal aunt was diagnosed with lymphoma at age 75.  A maternal cousin was diagnosed with breast cancer at age 44.  Floresita's maternal grandmother was diagnosed with colon cancer in her 80's and  at age 89.  Floresita's maternal grandfather was diagnosed with prostate cancer in his 100's and  at ae 105.  There is no reported family history of cancer on her paternal side of the family, however, her father is an only child.  Her maternal ethnicity is Syriac. Her paternal ethnicity is Gibraltarian. There is no known Ashkenazi Latter-day ancestry on either side of her family. There is no reported consanguinity.    Discussion:  Floresita's family history of breast, prostate, and colon is suggestive of a hereditary cancer syndrome.  We reviewed the features of sporadic, familial, and hereditary cancers. We discussed that mutations in either BRCA1 or BRCA2 could be possible hereditary explanations for her family history of cancer. Mutations in the BRCA1 or BRCA2 gene are known to cause Hereditary Breast and Ovarian Cancer Syndrome (HBOC). HBOC typically presents with multiple family members diagnosed with breast cancer before age 50 and/or ovarian cancer. Other cancer risks associated with HBOC include male breast cancer, prostate cancer, pancreatic cancer, and melanoma.   We discussed the natural history and genetics of hereditary cancer. A detailed handout regarding hereditary cancer, along with the other information we discussed, will be mailed to Floresita at the end of our appointment today and can be found in the after visit summary. Topics included: inheritance pattern, cancer risks, cancer  screening recommendations, and also risks, benefits and limitations of testing.  Based on her personal and family history, Floresita meets current National Comprehensive Cancer Network (NCCN) criteria for genetic testing of BRCA1/2 along with other high-penetrance breast cancer susceptibility genes (I.e. CDH1, PALB2, PTEN, and TP53).  Based on her personal and family history, Floresita meets current National Comprehensive Cancer Network (NCCN) criteria for genetic testing of HOXB13.  We discussed that given that her sister already had comprehensive genetic testing and did not identify a mutation in a breast cancer susceptibility gene, Floresita is likely at lower risk for hereditary cancer. However, given the early onset breast cancer on her maternal side of the family, Floresita could still be at risk for hereditary cancer.  We discussed that since we cannot base genetic testing or screening recommendations on her paternal family history as her father was an only child, it would be appropriate for her to consider more comprehensive genetic testing to better understand her risk for hereditary cancer. NCCN also states that individuals with limited family histories should consider more comprehensive genetic testing.  We discussed that there are additional genes that could cause increased risk for breast, colon, and/or prostate cancer. As many of these genes present with overlapping features in a family and accurate cancer risk cannot always be established based upon the pedigree analysis alone, it would be reasonable for Floresita to consider panel genetic testing to analyze multiple genes at once.  Genetic testing is available for BRCA1/2 as part of the patient's core panel. This will then be automatically reflexed to [facility]'s Multi-Cancer panel.  Genetic testing is available for 84 genes associated with increased risk for many different cancers: Multi-Cancers panel (AIP, ALK, APC, RANJEET, AXIN2, BAP1, BARD1, BLM,  BMPR1A, BRCA1, BRCA2, BRIP1, CASR, CDC73, CDH1, CDK4, CDKN1B, CDKN1C, CDKN2A, CEBPA, CHEK2, CTNNA1, DICER1, DIS3L2, EGFR, EPCAM, FH, FLCN, GATA2, GPC3, GREM1, HOXB13, HRAS, KIT, MAX, MEN1, MET, MITF, MLH1, MSH2, MSH3, MSH6, MUTYH, NBN, NF1, NF2, NTHL1, PALB2, PDGFRA, PHOX2B, PMS2, POLD1, POLE, POT1, UXIMJ5C, PTCH1, PTEN, RAD50, RAD51C, RAD51D, RB1, RECQL, RET, RUNX1, SDHA, SDHAF2, SDHB, SDHC, SDHD, SMAD4, SMARCA4, SMARCB1, SMARCE1, STK11, SUFU, TERC, TERT, UCEH443, TP53, TSC1, TSC2, VHL, WRN, and WT1).  We discussed that many of the genes in the Multi-Cancers panel are associated with specific hereditary cancer syndromes and have published management guidelines: Hereditary Breast and Ovarian Cancer syndrome (BRCA1, BRCA2), Galvez syndrome (MLH1, MSH2, MSH6, PMS2, EPCAM), Familial Adenomatous Polyposis (APC), Hereditary Diffuse Gastric Cancer (CDH1), Familial Atypical Multiple Mole Melanoma syndrome (CDK4, CDKN2A), Juvenile Polyposis syndrome (BMPR1A, SMAD4), Cowden syndrome (PTEN), Li Fraumeni syndrome (TP53), Peutz-Jeghers syndrome (STK11), MUTYH Associated Polyposis (MUTYH), Hereditary Leiomyomatosis and Renal Cell Cancer (FH), Hewu-Fyhj-Keps (FLCN), Hereditary Papillary Renal Carcinoma (MET), Hereditary Paraganglioma and Pheochromocytoma syndrome (SDHA, SDHAF2, SDHB, SDHC, SDHD), Multiple Endocrine Neoplasia type 2 (RET), Tuberous sclerosis complex (TSC1, TSC2), Von Hippel-Lindau disease (VHL), Neurofibromatosis type 1 (NF1), Neurofibromatosis type 2 (NF2), Multiple Endocrine Neoplasia type 1 (MEN1), Multiple Endocrine Neoplasia type 4 (CDKN1B), Gorlin syndrome (SUFU, PTCH1), and Galvan complex ([id number]).  The RANJEET, AXIN2, BARD1, BRIP1, CHEK2, GREM1, MSH3, NBN, NTHL1, PALB2, POLD1, POLE, RAD51C, and RAD51D genes are associated with increased cancer risk and have published management guidelines for certain cancers.    The remaining genes (AIP, ALK, BAP1, BLM, CASR, CDC73, CDKN1C, CEBPA, CTNNA1, DICER1, DIS3L2, EGFR,  GATA2, GPC3, HOXB13, HRAS, KIT, MAX, MITF, PDGFRA, PHOX2B, POT1, RAD50, RECQL4, REST, RUNX1, RB1, SMARCA4, SMARCB1, SMARCE1, TERC, TERT, TALS139, WRN, and WT1) are associated with increased cancer risk and may allow us to make medical recommendations when mutations are identified.   Floresita would like to submit a blood sample for her genetic testing. She will go to her nearest [facility] at her earliest convenience to get her blood drawn for her genetic testing.   Verbal consent was given over video and written on the consent form. Turnaround time is approximately 4 weeks once the lab receives the sample.  Medical Management: For Floresita, we reviewed that the information from genetic testing may determine:  additional cancer screening for which Floresita may qualify (i.e. mammogram and breast MRI, more frequent colonoscopies, more frequent dermatologic exams, etc.),  options for risk reducing surgeries Floresita could consider (i.e. bilateral mastectomy, surgery to remove her ovaries and/or uterus, etc.),    and targeted chemotherapies if she were to develop certain cancers in the future (i.e. immunotherapy for individuals with Galvez syndrome, PARP inhibitors, etc.).   These recommendations and possible targeted chemotherapies will be discussed in detail once genetic testing is completed.     Plan:  1) Today Floresita elected to proceed with BRCA1/2 testing with automatic reflex to [employer]'s Multi-Cancers panel.  2) A copy of the consent form and the after visit summary will be mailed to Floresita.  3) This information should be available in approximately 4 weeks, once the lab receives the sample.  4) I will call Floresita with the results once they become available.    Time spent on video: 31 minutes    Jimbo Vazquez MS, [facility]  Licensed, Certified Genetic Counselor      Virtual Visit Details    Type of service:  Video Visit     Originating Location (pt. Location): Home  Distant Location (provider  location):  Off-site  Platform used for Video Visit: Ko

## 2023-09-01 NOTE — Clinical Note
"    9/1/2023         RE: Floresita Hill  [address] [facility] [address]        Dear Colleague,    Thank you for referring your patient, Floresita Hill, to the [facility]. Please see a copy of my visit note below.    Virtual Visit Details    Type of service:  Video Visit     Originating Location (pt. Location): {video visit patient location:422073::\"Home\"}  {PROVIDER LOCATION On-site should be selected for visits conducted from your clinic location or adjoining [facility] hospital, academic office, or other nearby [facility] building. Off-site should be selected for all other provider locations, including home:[id number]}  Distant Location (provider location):  {virtual location provider:269016}  Platform used for Video Visit: {Virtual Visit Platforms:092928::\"[employer]\"}      9/1/2023    Referring Provider: ***    Presenting Information:   I met with Floresita for her video genetic counseling visit, through the Cancer Risk Management Program, to discuss her personal *** and family history of *** cancer. Today we reviewed this history, cancer screening recommendations, and available genetic testing options.    Personal History:  Floresita is a 53 year old year old female. She was diagnosed with ***; treatment included ***  / does not have any personal history of cancer.    ***She had her first menstrual period at age ***, her first child at age ***, and is ***.  ***Floresita has her ovaries, fallopian tubes and uterus in place, and she has had *** ovarian cancer screening to date. She reports that she *** hormone replacement therapy.      She has *** clinical breast exams and mammograms; her most recent mammogram in *** was ***. ***Floresita began having colonoscopies at the age of ***/Floresita has not had a colonoscopy. Her most recent colonoscopy in *** was *** and follow-up was recommended in ***. ***She does not regularly do any other cancer screening at this " time. Floresita reported *** tobacco use and *** alcohol use.    Family History: (Please see scanned pedigree for detailed family history information)  ***  ***  Her maternal ethnicity is ***. Her paternal ethnicity is ***. There is no known Ashkenazi Hoahaoism ancestry on either side of her family. There is no reported consanguinity.    Discussion:  Floresita's personal and family history of *** is suggestive of a hereditary cancer syndrome.  We reviewed the features of sporadic, familial, and hereditary cancers. ***  We discussed the natural history and genetics of hereditary cancer. A detailed handout regarding hereditary cancer, along with the other information we discussed, will be mailed to Floresita at the end of our appointment today and can be found in the after visit summary. Topics included: inheritance pattern, cancer risks, cancer screening recommendations, and also risks, benefits and limitations of testing.  Based on her personal and family history, Floresita meets current National Comprehensive Cancer Network (NCCN) criteria for genetic testing of ***.  We discussed that there are additional genes that could cause increased risk for *** cancer. As many of these genes present with overlapping features in a family and accurate cancer risk cannot always be established based upon the pedigree analysis alone, it would be reasonable for Floresita to consider panel genetic testing to analyze multiple genes at once.  ***  Floresita stated that she would prefer to submit a saliva kit for her genetic testing. ***[facility] will send a kit directly to her home with directions on how to collect a saliva sample. We discussed that there is a small chance for sample failure due to contamination of the sample. To help minimize this, she should follow the directions that are sent with the kit. Floresita verbalized understanding of this. Once the sample is collected, she will send it to ***[facility] using the return envelope  and prepaid shipping label.   Verbal consent was given over video*** and written on the consent form. Turnaround time is approximately 4 weeks once the lab receives the sample.  Medical Management: For Floresita, we reviewed that the information from genetic testing may determine:  ***surgery to treat Floresita's active cancer diagnosis (i.e. lumpectomy versus bilateral mastectomy, partial versus total colectomy, etc.***),  additional cancer screening for which Floresita may qualify (i.e. mammogram and breast MRI, more frequent colonoscopies, more frequent dermatologic exams, etc.***),  options for risk reducing surgeries Floresita could consider (i.e. bilateral mastectomy, surgery to remove her ovaries and/or uterus, etc.***),    and targeted chemotherapies for Floresita's *** active cancer, or ***if she were to develop certain cancers in the future (i.e. immunotherapy for individuals with Galvez syndrome, PARP inhibitors, etc.***).   These recommendations and possible targeted chemotherapies will be discussed in detail once genetic testing is completed.     Plan:  1) Today Floresita elected to proceed with ***.  2) A copy of the consent form and the after visit summary will be mailed to Floresita.  3) This information should be available in approximately 4 weeks, once the lab receives the sample.  4) I will call Floresita with the results once they become available.    Time spent on video: *** minutes    Jimbo Vazquez MS, [facility]  Licensed, Certified Genetic Counselor    ***    Virtual Visit Details    Type of service:  Video Visit     Originating Location (pt. Location): Home  Distant Location (provider location):  Off-site  Platform used for Video Visit: Ko        Again, thank you for allowing me to participate in the care of your patient.        Sincerely,        Jimbo Vazquez, DEL[place]

## 2023-09-01 NOTE — LETTER
September 1, 2023    Floresita Hill  [address] [id number]      Dear Floresita,    It was a pleasure speaking with you over video on 9/1/2023. Here is a copy of the progress note from our discussion. If you have any additional questions, please feel free to call.    Referring Provider: Maria Victoria Dubose MD    Presenting Information:   I met with Floresita for her video genetic counseling visit, through the [facility], to discuss her family history of breast, colon, and prostate cancer. Today we reviewed this history, cancer screening recommendations, and available genetic testing options.    Personal History:  Floresita is a 53 year old year old female. She does not have any personal history of cancer. She had her first menstrual period at age 12, her first child at age 28, and completed menopause at age 49. Floresita has her ovaries, fallopian tubes and uterus in place, and she has had no ovarian cancer screening to date. She reports that she has not used hormone replacement therapy.      She has annual clinical breast exams and mammograms; her most recent mammogram in April 2023 was normal. Floresita began having colonoscopies at the age of 49. Her most recent colonoscopy was reported to be in 2019 and was normal. Follow-up was recommended in 10 years. She does not regularly do any other cancer screening at this time.     Family History: (Please see scanned pedigree for detailed family history information)  Floresita's sister was diagnosed with breast cancer at age 48. She had genetic testing and was found to a mutation in HOXB13. Specifically, this mutation is c.251G>A (p.Kcn98Ctp). She also tested negative for 83 other genes: AIP, ALK, APC, RANJEET, AXIN2, BAP1, BARD1, BLM, BMPR1A, BRCA1, BRCA2, BRIP1, CASR, CDC73, CDH1, CDK4, CDKN1B, CDKN1C, CDKN2A, CEBPA, CHEK2, CTNNA1, DICER1, DIS3L2, EGFR, EPCAM, FH, FLCN, GATA2, GPC3, GREM1, HRAS, KIT, MAX, MEN1, MET, MITF,  MLH1, MSH2, MSH3, MSH6, MUTYH, NBN, NF1, NF2, NTHL1, PALB2, PHOX2B, PMS2, POLD1, POLE, POT1, CATQL9A, PTCH1, PTEN, RAD50, RAD51C, RAD51D, RB1, RECQL4, RET, RUNX1, SDHA, SDHAF2, SDHB, SDHC, SDHD, SMAD4, SMARCA4, SMARCB1, SMARCE1, STK11, SUFU, TERC, TERT, [id number], TP53, TSC1, TSC2, VHL, WRN, and WT1 genes.    Floresita's mother was diagnosed lung cancer at age 69 and  at age 70. She is reported to have a history of smoking.  A maternal uncle was diagnosed with lung cancer at age 58 and  at age 67. He is thought to have a history of smoking.  A maternal aunt was diagnosed with breast cancer at age 40.  A maternal aunt was diagnosed with lymphoma at age 75.  A maternal cousin was diagnosed with breast cancer at age 44.  Floresita's maternal grandmother was diagnosed with colon cancer in her 80's and  at age 89.  Floresita's maternal grandfather was diagnosed with prostate cancer in his 100's and  at ae 105.  There is no reported family history of cancer on her paternal side of the family, however, her father is an only child.  Her maternal ethnicity is [place]. Her paternal ethnicity is [place]. There is no known [employer] ancestry on either side of her family. There is no reported consanguinity.    Discussion:  Floresita's family history of breast, prostate, and colon is suggestive of a hereditary cancer syndrome.  We reviewed the features of sporadic, familial, and hereditary cancers. We discussed that mutations in either BRCA1 or BRCA2 could be possible hereditary explanations for her family history of cancer. Mutations in the BRCA1 or BRCA2 gene are known to cause Hereditary Breast and Ovarian Cancer Syndrome (HBOC). HBOC typically presents with multiple family members diagnosed with breast cancer before age 50 and/or ovarian cancer. Other cancer risks associated with HBOC include male breast cancer, prostate cancer, pancreatic cancer, and melanoma.   We discussed the natural history and  genetics of hereditary cancer. A detailed handout regarding hereditary cancer, along with the other information we discussed, will be mailed to Floresita at the end of our appointment today and can be found in the after visit summary. Topics included: inheritance pattern, cancer risks, cancer screening recommendations, and also risks, benefits and limitations of testing.  Based on her personal and family history, Floresita meets current National Comprehensive Cancer Network (NCCN) criteria for genetic testing of BRCA1/2 along with other high-penetrance breast cancer susceptibility genes (I.e. CDH1, PALB2, PTEN, and TP53).  Based on her personal and family history, Floresita meets current National Comprehensive Cancer Network (NCCN) criteria for genetic testing of HOXB13.  We discussed that given that her sister already had comprehensive genetic testing and did not identify a mutation in a breast cancer susceptibility gene, Floresita is likely at lower risk for hereditary cancer. However, given the early onset breast cancer on her maternal side of the family, Floresita could still be at risk for hereditary cancer.  We discussed that since we cannot base genetic testing or screening recommendations on her paternal family history as her father was an only child, it would be appropriate for her to consider more comprehensive genetic testing to better understand her risk for hereditary cancer. NCCN also states that individuals with limited family histories should consider more comprehensive genetic testing.  We discussed that there are additional genes that could cause increased risk for breast, colon, and/or prostate cancer. As many of these genes present with overlapping features in a family and accurate cancer risk cannot always be established based upon the pedigree analysis alone, it would be reasonable for Floresita to consider panel genetic testing to analyze multiple genes at once.  Genetic testing is available  for BRCA1/2 as part of the patient's core panel. This will then be automatically reflexed to [facility]'s Multi-Cancer panel.  Genetic testing is available for 84 genes associated with increased risk for many different cancers: Multi-Cancers panel (AIP, ALK, APC, RANJEET, AXIN2, BAP1, BARD1, BLM, BMPR1A, BRCA1, BRCA2, BRIP1, CASR, CDC73, CDH1, CDK4, CDKN1B, CDKN1C, CDKN2A, CEBPA, CHEK2, CTNNA1, DICER1, DIS3L2, EGFR, EPCAM, FH, FLCN, GATA2, GPC3, GREM1, HOXB13, HRAS, KIT, MAX, MEN1, MET, MITF, MLH1, MSH2, MSH3, MSH6, MUTYH, NBN, NF1, NF2, NTHL1, PALB2, PDGFRA, PHOX2B, PMS2, POLD1, POLE, POT1, [id number], PTCH1, PTEN, RAD50, RAD51C, RAD51D, RB1, RECQL, RET, RUNX1, SDHA, SDHAF2, SDHB, SDHC, SDHD, SMAD4, SMARCA4, SMARCB1, SMARCE1, STK11, SUFU, TERC, TERT, [id number], TP53, TSC1, TSC2, VHL, WRN, and WT1).  We discussed that many of the genes in the Multi-Cancers panel are associated with specific hereditary cancer syndromes and have published management guidelines: Hereditary Breast and Ovarian Cancer syndrome (BRCA1, BRCA2), Galvez syndrome (MLH1, MSH2, MSH6, PMS2, EPCAM), Familial Adenomatous Polyposis (APC), Hereditary Diffuse Gastric Cancer (CDH1), Familial Atypical Multiple Mole Melanoma syndrome (CDK4, CDKN2A), Juvenile Polyposis syndrome (BMPR1A, SMAD4), Cowden syndrome (PTEN), Li Fraumeni syndrome (TP53), Peutz-Jeghers syndrome (STK11), MUTYH Associated Polyposis (MUTYH), Hereditary Leiomyomatosis and Renal Cell Cancer (FH), Fvsf-Vngv-Ngnl (FLCN), Hereditary Papillary Renal Carcinoma (MET), Hereditary Paraganglioma and Pheochromocytoma syndrome (SDHA, SDHAF2, SDHB, SDHC, SDHD), Multiple Endocrine Neoplasia type 2 (RET), Tuberous sclerosis complex (TSC1, TSC2), Von Hippel-Lindau disease (VHL), Neurofibromatosis type 1 (NF1), Neurofibromatosis type 2 (NF2), Multiple Endocrine Neoplasia type 1 (MEN1), Multiple Endocrine Neoplasia type 4 (CDKN1B), Gorlin syndrome (SUFU, PTCH1), and Galvan complex (DUIFA1C).  The RANJEET, AXIN2, BARD1,  BRIP1, CHEK2, GREM1, MSH3, NBN, NTHL1, PALB2, POLD1, POLE, RAD51C, and RAD51D genes are associated with increased cancer risk and have published management guidelines for certain cancers.    The remaining genes (AIP, ALK, BAP1, BLM, CASR, CDC73, CDKN1C, CEBPA, CTNNA1, DICER1, DIS3L2, EGFR, GATA2, GPC3, HOXB13, HRAS, KIT, MAX, MITF, PDGFRA, PHOX2B, POT1, RAD50, RECQL4, REST, RUNX1, RB1, SMARCA4, SMARCB1, SMARCE1, TERC, TERT, [id number], WRN, and WT1) are associated with increased cancer risk and may allow us to make medical recommendations when mutations are identified.   Floresita would like to submit a blood sample for her genetic testing. She will go to her [facility] at her earliest convenience to get her blood drawn for her genetic testing.   Verbal consent was given over video and written on the consent form. Turnaround time is approximately 4 weeks once the lab receives the sample.  Medical Management: For Floresita, we reviewed that the information from genetic testing may determine:  additional cancer screening for which Floresita may qualify (i.e. mammogram and breast MRI, more frequent colonoscopies, more frequent dermatologic exams, etc.),  options for risk reducing surgeries Floresita could consider (i.e. bilateral mastectomy, surgery to remove her ovaries and/or uterus, etc.),    and targeted chemotherapies if she were to develop certain cancers in the future (i.e. immunotherapy for individuals with Galvez syndrome, PARP inhibitors, etc.).   These recommendations and possible targeted chemotherapies will be discussed in detail once genetic testing is completed.     Plan:  1) Today Floresita elected to proceed with BRCA1/2 testing with automatic reflex to [facility] Multi-Cancers panel.  2) A copy of the consent form and the after visit summary will be mailed to Floresita.  3) This information should be available in approximately 4 weeks, once the lab receives the sample.  4) I will call  Floresita with the results once they become available.    Time spent on video: 31 minutes    Jimbo Vazquez MS, [facility]  Licensed, Certified Genetic Counselor

## 2023-09-07 ENCOUNTER — OFFICE VISIT (OUTPATIENT)
Dept: INTERNAL MEDICINE | Facility: OTHER | Age: 53
End: 2023-09-07
Payer: COMMERCIAL

## 2023-09-07 VITALS
HEART RATE: 56 BPM | WEIGHT: 172.4 LBS | HEIGHT: 65 IN | BODY MASS INDEX: 28.72 KG/M2 | DIASTOLIC BLOOD PRESSURE: 80 MMHG | SYSTOLIC BLOOD PRESSURE: 140 MMHG | TEMPERATURE: 96.9 F | OXYGEN SATURATION: 98 %

## 2023-09-07 DIAGNOSIS — H69.93 DYSFUNCTION OF BOTH EUSTACHIAN TUBES: Primary | ICD-10-CM

## 2023-09-07 DIAGNOSIS — H66.90 ACUTE OTITIS MEDIA, UNSPECIFIED OTITIS MEDIA TYPE: ICD-10-CM

## 2023-09-07 PROCEDURE — 99213 OFFICE O/P EST LOW 20 MIN: CPT

## 2023-09-07 ASSESSMENT — ENCOUNTER SYMPTOMS
SINUS PAIN: 0
FEVER: 0
RHINORRHEA: 1
SINUS PRESSURE: 0
CHILLS: 0
SORE THROAT: 0

## 2023-09-07 ASSESSMENT — PATIENT HEALTH QUESTIONNAIRE - PHQ9
SUM OF ALL RESPONSES TO PHQ QUESTIONS 1-9: 3
SUM OF ALL RESPONSES TO PHQ QUESTIONS 1-9: 3
10. IF YOU CHECKED OFF ANY PROBLEMS, HOW DIFFICULT HAVE THESE PROBLEMS MADE IT FOR YOU TO DO YOUR WORK, TAKE CARE OF THINGS AT HOME, OR GET ALONG WITH OTHER PEOPLE: NOT DIFFICULT AT ALL

## 2023-09-07 ASSESSMENT — PAIN SCALES - GENERAL: PAINLEVEL: NO PAIN (0)

## 2023-09-07 NOTE — PATIENT INSTRUCTIONS
Assessing Cancer Risk  Cancer is a common diagnosis which impacts many families.  Individuals may develop cancer due to environmental factors (such as exposures and lifestyle), aging, genetic predisposition, or a combination of these factors.      Only about 5-10% of cancers are thought to be due to an inherited cancer susceptibility gene.    These families often have:  Several people with the same or related types of cancer  Cancers diagnosed at a young age (before age 50)  Individuals with more than one primary cancer  Multiple generations of the family affected with cancer    Comprehensive Breast and Gynecologic Cancer Panel  We each inherit two copies of every gene in our bodies: one from our mother, and one from our father. Each gene has a specific job to do.  When a gene has a mistake or  mutation  in it, it does not work like it should.     Some people may be candidates for genetic testing of more than one gene.  For these families, genetic testing using a cancer panel may be offered. These panels will test different genes at once known to increase the risk for breast, ovarian, uterine, and/or other cancers.    This handout will review common hereditary breast and gynecologic cancer syndromes. The genes that will be discussed in this handout are: RANJEET, BRCA1, BRCA2, BRIP1, CDH1, CHEK2, MLH1, MSH2, MSH6, PMS2, EPCAM, PTEN, PALB2, RAD51C, RAD51D, and TP53.    The purpose of this handout is to serve as a brief summary of the breast and gynecologic cancer risk genes that have published clinical management guidelines for individuals who are found to carry a mutation. Inheriting a mutation does not mean a person will develop cancer, but it does significantly increase their risk above the general population risk.     ______________________________________________________________________________    Hereditary Breast and Ovarian Cancer Syndrome (BRCA1 and BRCA2)  A single mutation in one of the copies of BRCA1 or  BRCA2 increases the risk for breast and ovarian cancer, among others.  The risk for pancreatic cancer and melanoma may also be slightly increased in some families.  The chart below shows the chance that someone with a BRCA mutation would develop cancer in his or her lifetime1,2,3,4.       Lifetime Cancer Risks    General Population BRCA1  BRCA2   Breast  12% >60% >60%   Ovarian  1-2% 39-58% 13-29%   Prostate 12% 7-26% 19-61%   Male Breast 0.1% 0.2-1.2% 1.8-7.1%   Pancreas 1-2% Up to 5% 5-10%     A person s ethnic background is also important to consider, as individuals of Ashkenazi Quaker ancestry have a higher chance of having a BRCA gene mutation.  There are three BRCA mutations that occur more frequently in this population.      Galvez Syndrome (MLH1, MSH2, MSH6, PMS2, and EPCAM)  Currently five genes are known to cause Galvez Syndrome: MLH1, MSH2, MSH6, PMS2, and EPCAM.  A single mutation in one of the Galvez Syndrome genes increases the risk for colon, endometrial, ovarian, and stomach cancers.  Other cancers that occur less commonly in Galvez Syndrome include urinary tract, skin, and brain cancers.  The chart below shows the chance that a person with Galvez syndrome would develop cancer in his or her lifetime5.      Lifetime Cancer Risks    General Population Galvez Syndrome   Colon 5% 10-61%   Endometrial 3% 13-57%   Ovarian 1-2% 1-38%   Stomach <1% 1-9%   *Cancer risk varies depending on Galvez syndrome gene found      Cowden Syndrome (PTEN)  Cowden syndrome is a hereditary condition that increases the risk for breast, thyroid, endometrial, colon, and kidney cancer.  Cowden syndrome is caused by a mutation in the PTEN gene.  A single mutation in one of the copies of PTEN causes Cowden syndrome and increases cancer risk.  The chart below shows the chance that someone with a PTEN mutation would develop cancer in their lifetime6,7.  Other benign features seen in some individuals with Cowden syndrome include benign  skin lesions (facial papules, keratoses, lipomas), learning disability, autism, thyroid nodules, colon polyps, and larger head size.     Lifetime Cancer Risks    General Population Cowden   Breast 12% 40-60%*   Thyroid 1% Up to 38%   Renal 1-2% Up to 35%   Endometrial 3% Up to 28%   Colon 5% Up to 9%   Melanoma 2-3% Up to 6%   *Emerging data suggests the risk for breast cancer could be greater than 60%               Li-Fraumeni Syndrome (TP53)  Li-Fraumeni Syndrome (LFS) is a cancer predisposition syndrome caused by a mutation in the TP53 gene. A single mutation in one of the copies of TP53 increases the risk for multiple cancers. Individuals with LFS are at an increased risk for developing cancer at a young age. The lifetime risk for development of a LFS-associated cancer is 50% by age 30 and 90% by age 60.   Core Cancers: Sarcomas, Breast, Brain, Lung, Leukemias/Lymphomas, Adrenocortical carcinomas  Other Cancers: Gastrointestinal, Thyroid, Skin, Genitourinary       Hereditary Diffuse Gastric Cancer (CDH1)  Currently, one gene is known to cause hereditary diffuse gastric cancer (HDGC): CDH1.  Individuals with HDGC are at increased risk for diffuse gastric cancer and lobular breast cancer. Of people diagnosed with HDGC, 30-50% have a mutation in the CDH1 gene.  This suggests there are likely other genes that may cause HDGC that have not been identified yet.      Lifetime Cancer Risks    General Population HDGC   Diffuse Gastric  <1% ~80%   Breast 12% 41-60%       Additional Genes    RANJEET  RANJEET is a moderate-risk breast cancer gene. Women who have a mutation in RANJEET can have between a 2-4 fold increased risk for breast cancer compared to the general population8. RANJEET mutations have also been associated with increased risk for pancreatic cancer between 5-10%9. Individuals who inherit two RANJEET mutations have a condition called ataxia-telangiectasia (AT).  This rare autosomal recessive condition affects the nervous system  and immune system, and is associated with progressive cerebellar ataxia beginning in childhood. Individuals with ataxia-telangiectasia often have a weakened immune system and have an increased risk for childhood cancers.    PALB2  Mutations in PALB2 have been shown to increase the risk of breast cancer up to 41-60% in some families; where individuals fall within this risk range is dependent upon family [id number]. PALB2 mutations have also been associated with increased risk for pancreatic cancer between 5-10%.  Individuals who inherit two PALB2 mutations--one from their mother and one from their father--have a condition called Fanconi Anemia.  This rare autosomal recessive condition is associated with short stature, developmental delay, bone marrow failure, and increased risk for childhood cancers.    CHEK2   CHEK2 is a moderate-risk breast cancer gene.  Women who have a mutation in CHEK2 have around a 2-4 fold increased risk for breast cancer compared to the general population, and this risk may be higher depending upon family history.11,12,13 The risk of colon cancer may be twice as high as the general population risk of colon cancer of 5%. Mutations in CHEK2 have also been shown to increase the risk of other cancers, including prostate, however these cancer risks are currently not well understood.    BRIP1, RAD51C and RAD51D  Mutations in RAD51C and RAD51D have been shown to increase the risk of ovarian cancer and breast cancer 14,. Mutations in BRIP1 have been shown to increase the risk of ovarian cancer and possibly female breast cancer 15 .       Lifetime Cancer Risk    General Population        BRIP1   RAD51C  RAD51D   Breast 12% Not well defined 20-40% 20-40%   Ovarian 1-2% 5-15% 10-15% 10-20%     ______________________________________________________________  Inheritance  All of the cancer syndromes reviewed above are inherited in an autosomal dominant pattern.  This means that if a parent has a mutation,  each of their children will have a 50% chance of inheriting that same mutation. Therefore, each child --male or female-- would have a 50% chance of being at increased risk for developing cancer.    Image obtained from Genetics Home Reference, 2013     Mutations in some genes can occur de lashay, which means that a person s mutation occurred for the first time in them and was not inherited from a parent.  Now that they have the mutation, however, it can be passed on to future generations.    Genetic Testing  Genetic testing involves a blood test and will look for any harmful mutations that are associated with increased cancer risk.  If possible, it is recommended that the person(s) who has had cancer be tested before other family members.  That person will give us the most useful information about whether or not a specific gene is associated with the cancer in the family.    Results  There are three possible results of genetic testing:  Positive--a harmful mutation was identified in one or more of the genes  Negative--no mutations were identified in any of the genes tested  Variant of unknown significance--a variation in one of the genes was identified, but it is unclear how this impacts cancer risk in the family    Advantages and Disadvantages   There are advantages and disadvantages to genetic testing.    Advantages  May clarify your cancer risk  Can help you make medical decisions  May explain the cancers in your family  May give useful information to your family members (if you share your results)    Disadvantages  Possible negative emotional impact of learning about inherited cancer risk  Uncertainty in interpreting a negative test result in some situations  Possible genetic discrimination concerns (see below)    Genetic Information Nondiscrimination Act (MERYL)  The Genetic Information Nondiscrimination Act of 2008 (MERYL) is a federal law that protects individuals from health insurance or employment discrimination  based on a genetic test result alone (with some exceptions, including employers with fewer than 15 employees, and ).  Although rare, MERYL  does not cover discrimination protections in terms of life insurance, long term care, or disability insurances.  Visit the National Human [employer] [place] website to learn more.    Reducing Cancer Risk  All of the genes described in this handout have nationally recognized cancer screening guidelines that would be recommended for individuals who test positive.  In addition to increased cancer screening, surgeries may be offered or recommended to reduce cancer risk.  Recommendations are based upon an individual s genetic test result as well as their personal and family history of cancer.    Questions to Think About Regarding Genetic Testing:  What effect will the test result have on me and my relationship with my family members if I have an inherited gene mutation?  If I don t have a gene mutation?  Should I share my test results, and how will my family react to this news, which may also affect them?  Are my children ready to learn new information that may one day affect their own health?    Hereditary Cancer Resources    FORCE: Facing Our Risk of Cancer Empowered facingourrisk.org   Bright Pink bebrightpink.org   Li-Fraumeni Syndrome Association lfsassociation.org   PTEN World PTENworld.com   No stomach for cancer, Inc. nostomachforcancer.org   Stomach cancer relief network Scrnet.org   Collaborative Group of the Americas on Inherited Colorectal Cancer (CGA) cgaicc.com    Cancer Care cancercare.org   American Cancer Society (ACS) cancer.org   National Cancer [place] (NCI) cancer.gov     Please call us if you have any questions or concerns.   Cancer Risk Management Program [phone] ([phone])  Jimbo Vazquez, MS [facility]  [phone]  Samantha Nolen, MS, [facility] [phone]  Indira Pedroza, MS, [facility]  [phone]  Romelia Randle, MS, [facility]  [phone]  Kimberly Berger,  MS, [facility]  [phone]  Keysha Perdomo, MS, [facility] [phone]  Maryse Hancock, MS, [facility] [phone]    References  Jesus Stoner PDP, Calixto S, Danielle TENA, Khurram JE, Ranjit JL, Shanae N, Curly H, Garett O, Courtney A, Pasini B, Radiosman P, Mansalome S, Kavya DM, Guerrero N, Iman E, Sohan H, Munguia E, Kamryn J, Gronnoemi J, Evelio B, Tulinius H, Thorlacius S, Eerola H, Nevanlinna H, Evelyn K, Kalie OP. Average risks of breast and ovarian cancer associated with BRCA1 or BRCA2 mutations detected in case series unselected for family history: a combined analysis of 222 studies. Am J Hum Beronica. 2003;72:1117-30.  Shirley N, Shania M, Silverio G.  BRCA1 and BRCA2 Hereditary Breast and Ovarian Cancer. Gene Reviews online. 2013.  Jose YC, Carrie S, Amalia G, Alves S. Breast cancer risk among male BRCA1 and BRCA2 mutation carriers. J Natl Cancer Inst. 2007;99:1811-4.  Mark OLVERA, Angella I, Julian J, Clotilde E, Trixie ER, Evelio F. Risk of breast cancer in male BRCA2 carriers. J Med Beronica. 2010;47:710-1.  National Comprehensive Cancer Network. Clinical practice guidelines in oncology, colorectal cancer screening. Available online (registration required). 2015.  William MH, Gibran J, Janie J, Perlita FLORENTINO, Evangelista MS, Eng C. Lifetime cancer risks in individuals with germline PTEN mutations. Clin Cancer Res. 2012;18:400-7.  Miguel A R. Cowden Syndrome: A Critical Review of the Clinical Literature. J Beronica . 2009:18:13-27.  Anahy WARREN, Ney HOBSON, Poncho S, Allie P, Destini T, Maritza M, Dirk B, Naren H, Jess R, Claudette K, Rani L, Mark OLVERA, Kavya HOBSON, Ryan DF, Nathan MR, The Breast Cancer Susceptibility Collaboration (UK) & Herb PADILLA. RANJEET mutations that cause ataxia-telangiectasia are breast cancer susceptibility alleles. Nature Genetics. 2006;38:873-875  Adrián N , Radhames Y, Tricia J, Sadia L, Theo MITCHELL , Reji ML, Jose S, Ashli AG, Juli S, Catrachita ML, Lenny J , Ambrose R, Antelmo PRINCE, Wilber  JR, Alexsander VE, Herb M, Vojanethstein B, Meliza N, Katerina RH, Jesenia KW, and Loreta AP. RANJEET mutations in patients with hereditary pancreatic cancer. Cancer Discover. 2012;2:41-46  Reena KHAN., et al. Breast-Cancer Risk in Families with Mutations in PALB2. NEJM. 2014; 371(6):497-506.  CHEK2 Breast Cancer Case-Control Consortium. CHEK2*1100delC and susceptibility to breast cancer: A collaborative analysis involving 10,860 breast cancer cases and 9,065 controls from 10 studies. Am J Hum Beronica, 74 (2004), pp. 5361-9367  Dustin T, Naty S, Shay K, et al. Spectrum of Mutations in BRCA1, BRCA2, CHEK2, and TP53 in Families at High Risk of Breast Cancer. EDEN. 2006;295(12):1819-6825.   Padma C, Gonzalo D, Claire WARREN, et al. Risk of breast cancer in women with a CHEK2 mutation with and without a family history of breast cancer. J Clin Oncol. 2011;29:4646-3042.  Song H, Chalos E, Ramus SJ, et al. Contribution of germline mutations in the RAD51B, RAD51C, and RAD51D genes to ovarian cancer in the population. J Clin Oncol. 2015;33(26):6956-4250. Doi:10.1200/JCO.2015.61.2408.  Lawrence T, David DF, Dajuan P, et al. Mutations in BRIP1 confer high risk of ovarian cancer. [place] Beronica. 2011;43(11):4396-8205. doi:10.1038/ng.955.

## 2023-09-07 NOTE — NURSING NOTE
Patient presents with fullness and pressure in her ears.  FOOD SECURITY SCREENING QUESTIONS  Hunger Vital Signs:  Within the past 12 months we worried whether our food would run out before we got money to buy more. Never  Within the past 12 months the food we bought just didn't last and we didn't have money to get more. Never  Sherly Quintero LPN 9/7/2023 11:30 AM       Medication Reconciliation: complete    Sherly Quintero LPN  9/7/2023 11:30 AM

## 2023-09-07 NOTE — PROGRESS NOTES
Assessment & Plan   Floresita Hill is a 53 year old presenting for the following health issues:      ICD-10-CM    1. Dysfunction of both eustachian tubes  H69.83       2. Acute otitis media, unspecified otitis media type  H66.90 amoxicillin-clavulanate (AUGMENTIN) 875-125 MG tablet        Patient's symptoms and physical examination consistent with a station tube dysfunction.  Instructed patient to use Claritin or Zyrtec daily, Flonase nasal spray, Mucinex or Sudafed until symptoms resolve.  If she starts to develop pain, worsening symptoms I did send in an antibiotic for Augmentin to take twice daily for 7 days for otitis media.     Patient is slightly hypertensive today 140/80.She has an upcoming physical states that her blood pressures are usually within normal range.-She will recheck blood pressure at that time.    No follow-ups on file.    JAHAIRA Gifford CNP  [facility]      Subjective   Floresita is a 53 year old, presenting for the following health issues:    Patient presents to clinic for concerns of bilateral ear pressure and fullness that has been present for the last 2 weeks.  She has bilateral hearing aids, suspected that this was related to her hearing aids, she removed them and use Debrox and did not notice an improvement.  She has also noticed that she has had ongoing rhinorrhea, postnasal drainage.  She has been taking Sudafed for this but continues to have ear fullness.  She denies any pain.      History of Present Illness       Reason for visit:  Left ear feel  full  and hard to hear  Symptom onset:  3-7 days ago  Symptoms include:  Left ear feels  full  and hard to hear  Symptom intensity:  Moderate  Symptom progression:  Staying the same  Had these symptoms before:  Yes  Has tried/received treatment for these symptoms:  No    She eats 2-3 servings of fruits and vegetables daily.She consumes 0 sweetened beverage(s) daily.She exercises with enough effort  "to increase her heart rate 30 to 60 minutes per day.  She exercises with enough effort to increase her heart rate 6 days per week. She is missing 1 dose(s) of medications per week.  She is not taking prescribed medications regularly due to other.               Review of Systems   Constitutional:  Negative for chills and fever.   HENT:  Positive for rhinorrhea. Negative for ear discharge, ear pain, sinus pressure, sinus pain and sore throat.         + ear fullness- bilateral L>R   All other systems reviewed and are negative.           Objective    BP (!) 140/80 (BP Location: Right arm)   Pulse 56   Temp 96.9  F (36.1  C) (Tympanic)   Ht 1.657 m (5' 5.25\")   Wt 78.2 kg (172 lb 6.4 oz)   LMP 11/01/2020 (Approximate)   SpO2 98%   BMI 28.47 kg/m    Body mass index is 28.47 kg/m .  Physical Exam  Vitals reviewed.   Constitutional:       General: She is not in acute distress.     Appearance: She is not toxic-appearing.   HENT:      Right Ear: External ear normal. No tenderness. Tympanic membrane is not perforated or erythematous.      Left Ear: External ear normal. No tenderness. Tympanic membrane is not perforated or erythematous.      Ears:      Comments: +Serous fluid behind both ear drums consistent with eustachian tube dysfunction- no erythema or purulent drainage noted.   Neurological:      Mental Status: She is alert.                              "

## 2023-09-07 NOTE — PATIENT INSTRUCTIONS
Take Zyrtec or Claritin daily along with flonase nasal spray    Mucinex or Sudafed daily until symptoms improve        Start antibiotic- IF symptoms worsen or do not improve in 1 week

## 2023-09-08 ENCOUNTER — LAB (OUTPATIENT)
Dept: LAB | Facility: OTHER | Age: 53
End: 2023-09-08
Attending: GENETIC COUNSELOR, MS
Payer: COMMERCIAL

## 2023-09-08 ENCOUNTER — TRANSFERRED RECORDS (OUTPATIENT)
Dept: HEALTH INFORMATION MANAGEMENT | Facility: OTHER | Age: 53
End: 2023-09-08

## 2023-09-08 DIAGNOSIS — Z80.3 FAMILY HISTORY OF MALIGNANT NEOPLASM OF BREAST: ICD-10-CM

## 2023-09-08 DIAGNOSIS — Z84.81 FAMILY HISTORY OF CARRIER OF GENETIC DISEASE: ICD-10-CM

## 2023-09-08 DIAGNOSIS — Z80.0 FAMILY HISTORY OF MALIGNANT NEOPLASM OF COLON: ICD-10-CM

## 2023-09-08 PROCEDURE — 36415 COLL VENOUS BLD VENIPUNCTURE: CPT | Mod: ZL

## 2023-09-22 DIAGNOSIS — E10.9 TYPE 1 DIABETES MELLITUS WITHOUT COMPLICATION (H): Primary | ICD-10-CM

## 2023-09-22 LAB — SCANNED LAB RESULT: NORMAL

## 2023-09-24 DIAGNOSIS — E06.3 HYPOTHYROIDISM DUE TO HASHIMOTO'S THYROIDITIS: ICD-10-CM

## 2023-09-25 DIAGNOSIS — Z80.3 FAMILY HISTORY OF MALIGNANT NEOPLASM OF BREAST: Primary | ICD-10-CM

## 2023-09-25 DIAGNOSIS — Z84.81 FAMILY HISTORY OF CARRIER OF GENETIC DISEASE: ICD-10-CM

## 2023-09-25 DIAGNOSIS — Z80.0 FAMILY HISTORY OF MALIGNANT NEOPLASM OF COLON: ICD-10-CM

## 2023-09-25 DIAGNOSIS — F34.1 DYSTHYMIC DISORDER: Primary | ICD-10-CM

## 2023-10-02 RX ORDER — LEVOTHYROXINE SODIUM 125 UG/1
125 TABLET ORAL
Qty: 90 TABLET | Refills: 1 | Status: SHIPPED | OUTPATIENT
Start: 2023-10-02 | End: 2024-05-29

## 2023-10-02 NOTE — TELEPHONE ENCOUNTER
[facility] sent Rx request for the following:      Requested Prescriptions   Pending Prescriptions Disp Refills    FLUoxetine (PROZAC) 10 MG capsule [Pharmacy Med Name: fluoxetine 10 mg capsule] 270 capsule 4     Sig: Take 3 capsules (30 mg) by mouth daily     Last Prescription Date:   8/22/23  Last Fill Qty/Refills:         270, R-4    Last Office Visit:              3/30/23   Future Office visit:           10/4/23    Dr. Crowder last ordered this medication 8/2/22. Will route to her for refill consideration.     Xena Prather RN on 10/2/2023 at 3:34 PM

## 2023-10-02 NOTE — TELEPHONE ENCOUNTER
"levothyroxine 125 mcg tablet   Requested Prescriptions   Pending Prescriptions Disp Refills    levothyroxine (SYNTHROID/LEVOTHROID) 125 MCG tablet [Pharmacy Med Name: levothyroxine 125 mcg tablet] 90 tablet 1     Sig: Take 1 tablet (125 mcg) by mouth every morning (before breakfast) Increase in dose       Thyroid Protocol Passed - 9/24/2023  8:00 AM        Passed - Patient is 12 years or older        Passed - Recent (12 mo) or future (30 days) visit within the authorizing provider's specialty     Patient has had an office visit with the authorizing provider or a provider within the authorizing providers department within the previous 12 mos or has a future within next 30 days. See \"Patient Info\" tab in inbasket, or \"Choose Columns\" in Meds & Orders section of the refill encounter.            Passed - Medication is active on med list        Passed - Normal TSH on file in past 12 months     Recent Labs   Lab Test 03/07/23  1605   TSH 1.21              Passed - No active pregnancy on record     If patient is pregnant or has had a positive pregnancy test, please check TSH.          Passed - No positive pregnancy test in past 12 months     If patient is pregnant or has had a positive pregnancy test, please check TSH.            It is noted dose increase, with further looking, it is noted dose change 12/16/22, with labs followed after  per protocol.  Will refill per policy. Prescription refilled per RN Medication RefillPolicy.................... Martine Whitley RN ....................  10/2/2023   11:28 AM     "

## 2023-10-04 ENCOUNTER — OFFICE VISIT (OUTPATIENT)
Dept: FAMILY MEDICINE | Facility: OTHER | Age: 53
End: 2023-10-04
Attending: STUDENT IN AN ORGANIZED HEALTH CARE EDUCATION/TRAINING PROGRAM
Payer: COMMERCIAL

## 2023-10-04 ENCOUNTER — VIRTUAL VISIT (OUTPATIENT)
Dept: ONCOLOGY | Facility: CLINIC | Age: 53
End: 2023-10-04
Attending: GENETIC COUNSELOR, MS
Payer: COMMERCIAL

## 2023-10-04 VITALS
SYSTOLIC BLOOD PRESSURE: 125 MMHG | DIASTOLIC BLOOD PRESSURE: 81 MMHG | TEMPERATURE: 97.3 F | RESPIRATION RATE: 18 BRPM | HEART RATE: 62 BPM | BODY MASS INDEX: 28.99 KG/M2 | OXYGEN SATURATION: 98 % | WEIGHT: 174 LBS | HEIGHT: 65 IN

## 2023-10-04 DIAGNOSIS — N95.1 MENOPAUSAL SYNDROME (HOT FLASHES): ICD-10-CM

## 2023-10-04 DIAGNOSIS — Z80.3 FAMILY HISTORY OF MALIGNANT NEOPLASM OF BREAST: Primary | ICD-10-CM

## 2023-10-04 DIAGNOSIS — Z84.81 FAMILY HISTORY OF CARRIER OF GENETIC DISEASE: ICD-10-CM

## 2023-10-04 DIAGNOSIS — E10.9 TYPE 1 DIABETES MELLITUS WITHOUT COMPLICATION (H): Primary | ICD-10-CM

## 2023-10-04 DIAGNOSIS — Z80.0 FAMILY HISTORY OF MALIGNANT NEOPLASM OF COLON: ICD-10-CM

## 2023-10-04 DIAGNOSIS — M79.642 PAIN OF LEFT HAND: ICD-10-CM

## 2023-10-04 PROCEDURE — 99207 PR FOOT EXAM NO CHARGE: CPT | Performed by: STUDENT IN AN ORGANIZED HEALTH CARE EDUCATION/TRAINING PROGRAM

## 2023-10-04 PROCEDURE — 99213 OFFICE O/P EST LOW 20 MIN: CPT | Performed by: STUDENT IN AN ORGANIZED HEALTH CARE EDUCATION/TRAINING PROGRAM

## 2023-10-04 PROCEDURE — 999N000069 HC STATISTIC GENETIC COUNSELING, < 16 MIN: Mod: GT,95 | Performed by: GENETIC COUNSELOR, MS

## 2023-10-04 RX ORDER — FLUOXETINE 10 MG/1
30 CAPSULE ORAL DAILY
Qty: 270 CAPSULE | Refills: 4 | Status: SHIPPED | OUTPATIENT
Start: 2023-10-04 | End: 2023-10-04

## 2023-10-04 RX ORDER — FLUOXETINE 10 MG/1
CAPSULE ORAL
Qty: 90 CAPSULE | Refills: 4 | Status: SHIPPED | OUTPATIENT
Start: 2023-10-04

## 2023-10-04 ASSESSMENT — PATIENT HEALTH QUESTIONNAIRE - PHQ9
SUM OF ALL RESPONSES TO PHQ QUESTIONS 1-9: 1
10. IF YOU CHECKED OFF ANY PROBLEMS, HOW DIFFICULT HAVE THESE PROBLEMS MADE IT FOR YOU TO DO YOUR WORK, TAKE CARE OF THINGS AT HOME, OR GET ALONG WITH OTHER PEOPLE: NOT DIFFICULT AT ALL
SUM OF ALL RESPONSES TO PHQ QUESTIONS 1-9: 1

## 2023-10-04 ASSESSMENT — PAIN SCALES - GENERAL: PAINLEVEL: NO PAIN (0)

## 2023-10-04 NOTE — PROGRESS NOTES
"  Assessment & Plan     Type 1 diabetes mellitus without complication (H)  New referral to [facility]. Labs ordered. Normal foot exam. Needs new pump. Continue current regimen   - Adult Endocrinology  Referral; Future  - Lipid Panel; Future  - Hemoglobin A1c; Future  - Albumin Random Urine Quantitative with Creat Ratio; Future  - FOOT EXAM    Pain of left hand  Likely arthritis. Discussed supportive cares at home. Consider OT if needed     Menopausal syndrome (hot flashes)  Family history of breast ca so wants to avoid hormones. Could consider effexor, wean from prozac to effexor if wanted to trial              BMI:   Estimated body mass index is 28.73 kg/m  as calculated from the following:    Height as of this encounter: 1.657 m (5' 5.25\").    Weight as of this encounter: 78.9 kg (174 lb).           No follow-ups on file.    Gustavo Marvin MD  [facility]    Nikolai Canas is a 53 year old, presenting for the following health issues:  Diabetes (Follow up with management)      HPI     Diabetes referral for new pump   [facility] has been unable to get patient in   Due for a1c and lipids     Arthritis in hands   - voltaren gel   - left hand third and fourth digits  - right handed but knits    BP   - normal today but has been up and down, wondering about DASH diet     Hot flashes                Review of Systems   Constitutional, HEENT, cardiovascular, pulmonary, gi and gu systems are negative, except as otherwise noted.      Objective    /81 (BP Location: Right arm, Patient Position: Sitting, Cuff Size: Adult Regular)   Pulse 62   Temp 97.3  F (36.3  C) (Tympanic)   Resp 18   Ht 1.657 m (5' 5.25\")   Wt 78.9 kg (174 lb)   LMP 11/01/2020 (Approximate)   SpO2 98%   BMI 28.73 kg/m    Body mass index is 28.73 kg/m .  Physical Exam   GENERAL: healthy, alert and no distress  RESP: lungs clear to auscultation - no rales, rhonchi or wheezes  CV: regular rate and " rhythm, normal S1 S2, no S3 or S4, no murmur, click or rub, no peripheral edema and peripheral pulses strong  PSYCH: mentation appears normal, affect normal/bright  Diabetic foot exam: normal DP and PT pulses, no trophic changes or ulcerative lesions, and normal sensory exam

## 2023-10-04 NOTE — PROGRESS NOTES
10/4/2023    Referring Provider: Maria Victoria Dubose MD    Presenting Information:  I spoke to Floresita by video today to discuss her genetic testing results. Her blood was drawn on 9/8/23. The Multi-Cancers panel was ordered from [facility]. This testing was done because of Floresita's family history of breast, colon, and prostate cancer.    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 This variant is called n.165G>A (RNA change). No other variants or mutations were found in the TERC gene. Given the uncertain significance of this result, medical management decisions should NOT be made based on this test result alone.    Of note, Floresita is negative for mutations in the AIP, ALK, APC, RANJEET, AXIN2, BAP1, BARD1, BLM, BMPR1A, BRCA1, BRCA2, BRIP1, CASR, CDC73, CDH1, CDK4, CDKN1B, CDKN1C, CDKN2A, CEBPA, CHEK2, CTNNA1, DICER1, DIS3L2, EGFR, EPCAM, FH, FLCN, GATA2, GPC3, GREM1, HOXB13,HRAS, KIT, MAX, MEN1, MET, MITF, MLH1, MSH2, MSH3, MSH6, MUTYH, NBN, NF1, NF2, NTHL1, PALB2, PHOX2B, PMS2, POLD1, POLE, POT1, PSAAB1F, PTCH1, PTEN, RAD50, RAD51C, RAD51D, RB1, RECQL4, RET, RUNX1, SDHA, SDHAF2, SDHB, SDHC, SDHD, SMAD4, SMARCA4, SMARCB1, SMARCE1, STK11, SUFU, TERT, DKCE282, TP53, TSC1, TSC2, VHL, WRN, and WT1 genes. No mutations were found in any of the other 83 genes analyzed. This test involved sequencing and deletion/duplication analysis of all genes with the exceptions of EPCAM and GREM1 (deletions/duplications only), MITF (only the status of the c.952G>A (p.E318K) alteration is analyzed and reported), and SDHA (sequencing only). We reviewed the autosomal dominant inheritance of these genes. Floresita cannot pass on a mutation in any of these genes to her children based on this test result. Mutations in these genes do not skip generations.      A copy of the test report can be found in the Laboratory tab, dated 9/8/23, and  "named \"LABORATORY MISCELLANEOUS ORDER\". The report is scanned in as a linked document.    Interpretation:  We discussed several different interpretations of this inconclusive test result. It is not clear if this variant in the TERC gene is associated with increased cancer risk.  1. This variant may be a benign change that does not increase cancer risk.  2. This variant may be a harmful mutation that causes Dyskeratosis Congenita.    TERC n.165G>A (RNA change)  Harmful mutations in the TERC gene are associated dyskeratosis congenita. Individuals with dyskeratosis congenita typically have at least two of the three following features: dysplastic (abnormally shaped) nails, lacy, reticular pigmentation on the upper chest and/or neck, and oral leukoplakia (white patches in the mouth). Other features associated with dyskeratosis congenita include bone marrow failure, myelodysplastic syndrome/ acute myeloid leukemia, early onset head/neck squamous cell carcinoma, pulmonary fibrosis, premature greying, developmental delay, and many other health issues. Floresita denied a personal and family history of the above features. This suggests that the chances that this variant is a harmful mutation in this family are low. We discussed that medical management decisions are NOT recommended for individuals with a VUS result.    Genetic testing labs are working to collect evidence about if this variant is harmful or benign, and they will contact me if it is reclassified. If this variant is determined to be a benign change, there may be a different gene or combination of genes and environment that are associated with the cancers in this family.    It is also important to consider that her a close maternal relative with cancer may have had a mutation in one of the genes tested and she did not inherit it.     Inheritance:  We reviewed the autosomal dominant inheritance of this variant in the TERC gene. We discussed that Floresita has a 50% " chance to pass this variant to each of her children. Likewise, each of her siblings has a 50% risk of having the same variant. Because it is unclear what, if any, risk is associated with this variant, clinical genetic testing for this TERC variant alone is not recommended for relatives.    Screening:  Based on this inconclusive test result, it is important for Floresita and her relatives to refer back to the family history for appropriate cancer screening.    Based on her personal and family history, Floresita has a 31.8% lifetime risk of developing breast cancer based on the RUDY model. As such, Floresita meets current National Comprehensive Cancer Network (NCCN) guidelines for high risk breast screening. This includes annual breast MRI in addition to annual mammogram beginning 10 year prior to the earliest breast cancer diagnosis. In addition, Floresita should be receiving clinical breast exams by her physician. We discussed that Floresita could participate in our Cancer Risk Management Program in which our nursing specialist provides an individual screening plan and assists with medical management. Floresita mentioned that this screening has already been recommended by her breast team and she has an MRI already lined up. She will contact me should she be interested in meeting with our nurse specialist in the future.  Other population cancer screening options, such as those recommended by the American Cancer Society and the National Comprehensive Cancer Network (NCCN), are also appropriate for Floresita and her family. These screening recommendations may change if there are changes to Floresita's personal and/or family history of cancer. Final screening recommendations should be made by each individual's primary care provider.      Additional Testing Considerations:  Although Floresita's genetic testing result is inconclusive, other relatives may still carry a harmful gene mutation associated with breast, colon,  and/or prostate cancer. Genetic counseling is recommended for other close maternal relatives with cancer to discuss genetic testing options. If any of these relatives do pursue genetic testing, Floresita is encouraged to contact me so that we may review the impact of their test results on her.    Summary:  We do not have an explanation for Floresita's family history of cancer. While no genetic changes were identified, Floresita may still be at risk for certain cancers due to family history, environmental factors, or other genetic causes not identified by this test.  Because of that, it is important that she continue with cancer screening based on her personal and family history as discussed above.    Genetic testing is rapidly advancing, and new cancer susceptibility genes will most likely be identified in the future. Therefore, I encouraged Floresita to contact me annually or if there are changes in her personal or family history. This may change how we assess her cancer risk, screening, and the testing we would offer.    Plan:  1.  A copy of the test results will be sent to Floresita.    2.  She plans to follow-up with her other providers.  3.  She should contact me periodically, or if her  family history changes, and she would like to know if there are additional screening or testing recommendations at that time.    4. I will attempt to contact Floresita if the laboratory informs me that this VUS has been reclassified.  This may change screening and testing recommendations for Floresita and her relatives.    If Floresita has any further questions, I encouraged her to contact me via [employer].    Time spent on video: 10 minutes    Jimbo Vazquez MS, [facility] [place]  Licensed, Certified Genetic Counselor      Virtual Visit Details    Type of service:  Video Visit     Originating Location (pt. Location): Home  Distant Location (provider location):  Off-site  Platform used for Video Visit: Ko

## 2023-10-04 NOTE — LETTER
October 4, 2023    Floresita Hill  [address] [address]      Dear Floresita,    It was a pleasure speaking with you over video on 10/4/2023. Here is a copy of the progress note from our discussion. If you have any additional questions, please feel free to call.    Referring Provider: Maria Victoria Dubose MD    Presenting Information:  I spoke to Floresita by video today to discuss her genetic testing results. Her blood was drawn on 9/8/23. The Multi-Cancers panel was ordered from [facility]. This testing was done because of Floresita's family history of breast, colon, and prostate cancer.    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 This variant is called n.165G>A (RNA change). No other variants or mutations were found in the TERC gene. Given the uncertain significance of this result, medical management decisions should NOT be made based on this test result alone.    Of note, Floresita is negative for mutations in the AIP, ALK, APC, RANJEET, AXIN2, BAP1, BARD1, BLM, BMPR1A, BRCA1, BRCA2, BRIP1, CASR, CDC73, CDH1, CDK4, CDKN1B, CDKN1C, CDKN2A, CEBPA, CHEK2, CTNNA1, DICER1, DIS3L2, EGFR, EPCAM, FH, FLCN, GATA2, GPC3, GREM1, HOXB13,HRAS, KIT, MAX, MEN1, MET, MITF, MLH1, MSH2, MSH3, MSH6, MUTYH, NBN, NF1, NF2, NTHL1, PALB2, PHOX2B, PMS2, POLD1, POLE, POT1, QWFKW5U, PTCH1, PTEN, RAD50, RAD51C, RAD51D, RB1, RECQL4, RET, RUNX1, SDHA, SDHAF2, SDHB, SDHC, SDHD, SMAD4, SMARCA4, SMARCB1, SMARCE1, STK11, SUFU, TERT, SAXG852, TP53, TSC1, TSC2, VHL, WRN, and WT1 genes. No mutations were found in any of the other 83 genes analyzed. This test involved sequencing and deletion/duplication analysis of all genes with the exceptions of EPCAM and GREM1 (deletions/duplications only), MITF (only the status of the c.952G>A (p.E318K) alteration is analyzed and reported), and SDHA (sequencing only). We reviewed  "the autosomal dominant inheritance of these genes. Floresita cannot pass on a mutation in any of these genes to her children based on this test result. Mutations in these genes do not skip generations.      A copy of the test report can be found in the Laboratory tab, dated 9/8/23, and named \"LABORATORY MISCELLANEOUS ORDER\". The report is scanned in as a linked document.      Interpretation:  We discussed several different interpretations of this inconclusive test result. It is not clear if this variant in the TERC gene is associated with increased cancer risk.  1. This variant may be a benign change that does not increase cancer risk.  2. This variant may be a harmful mutation that causes Dyskeratosis Congenita.    TERC n.165G>A (RNA change)  Harmful mutations in the TERC gene are associated dyskeratosis congenita. Individuals with dyskeratosis congenita typically have at least two of the three following features: dysplastic (abnormally shaped) nails, lacy, reticular pigmentation on the upper chest and/or neck, and oral leukoplakia (white patches in the mouth). Other features associated with dyskeratosis congenita include bone marrow failure, myelodysplastic syndrome/ acute myeloid leukemia, early onset head/neck squamous cell carcinoma, pulmonary fibrosis, premature greying, developmental delay, and many other health issues. Floresita denied a personal and family history of the above features. This suggests that the chances that this variant is a harmful mutation in this family are low. We discussed that medical management decisions are NOT recommended for individuals with a VUS result.    Genetic testing labs are working to collect evidence about if this variant is harmful or benign, and they will contact me if it is reclassified. If this variant is determined to be a benign change, there may be a different gene or combination of genes and environment that are associated with the cancers in this family.    It is " also important to consider that her a close maternal relative with cancer may have had a mutation in one of the genes tested and she did not inherit it.     Inheritance:  We reviewed the autosomal dominant inheritance of this variant in the TERC gene. We discussed that Floresita has a 50% chance to pass this variant to each of her children. Likewise, each of her siblings has a 50% risk of having the same variant. Because it is unclear what, if any, risk is associated with this variant, clinical genetic testing for this TERC variant alone is not recommended for relatives.    Screening:  Based on this inconclusive test result, it is important for Floresita and her relatives to refer back to the family history for appropriate cancer screening.    Based on her personal and family history, Floresita has a 31.8% lifetime risk of developing breast cancer based on the RUDY model. As such, Floresita meets current National Comprehensive Cancer Network (NCCN) guidelines for high risk breast screening. This includes annual breast MRI in addition to annual mammogram beginning 10 year prior to the earliest breast cancer diagnosis. In addition, Floresita should be receiving clinical breast exams by her physician. We discussed that Floresita could participate in our Cancer Risk Management Program in which our nursing specialist provides an individual screening plan and assists with medical management. Floresita mentioned that this screening has already been recommended by her breast team and she has an MRI already lined up. She will contact me should she be interested in meeting with our nurse specialist in the future.  Other population cancer screening options, such as those recommended by the American Cancer Society and the National Comprehensive Cancer Network (NCCN), are also appropriate for Floresita and her family. These screening recommendations may change if there are changes to Floresita's personal and/or family history of  cancer. Final screening recommendations should be made by each individual's primary care provider.          Additional Testing Considerations:  Although Floresita's genetic testing result is inconclusive, other relatives may still carry a harmful gene mutation associated with breast, colon, and/or prostate cancer. Genetic counseling is recommended for other close maternal relatives with cancer to discuss genetic testing options. If any of these relatives do pursue genetic testing, Floresita is encouraged to contact me so that we may review the impact of their test results on her.    Summary:  We do not have an explanation for Floresita's family history of cancer. While no genetic changes were identified, Floresita may still be at risk for certain cancers due to family history, environmental factors, or other genetic causes not identified by this test.  Because of that, it is important that she continue with cancer screening based on her personal and family history as discussed above.    Genetic testing is rapidly advancing, and new cancer susceptibility genes will most likely be identified in the future. Therefore, I encouraged Floresita to contact me annually or if there are changes in her personal or family history. This may change how we assess her cancer risk, screening, and the testing we would offer.    Plan:  1.  A copy of the test results will be sent to Floresita.    2.  She plans to follow-up with her other providers.  3.  She should contact me periodically, or if her  family history changes, and she would like to know if there are additional screening or testing recommendations at that time.    4. I will attempt to contact Floresita if the laboratory informs me that this VUS has been reclassified.  This may change screening and testing recommendations for Floresita and her relatives.    If Floresita has any further questions, I encouraged her to contact me via [employer].    Time spent on video: 10  minutes    Jimbo Vazquez MS, [facility] [place]  Licensed, Certified Genetic Counselor

## 2023-10-04 NOTE — Clinical Note
10/4/2023         RE: Floresita Hill  [address] [facility] [address]        Dear Colleague,    Thank you for referring your patient, Floresita Hill, to the [facility]. Please see a copy of my visit note below.    10/4/2023    Referring Provider: Maria Victoria Dubose MD    Presenting Information:  I spoke to Floresita by video today to discuss her genetic testing results. Her blood was drawn on 9/8/23. The Multi-Cancers panel was ordered from [facility]. This testing was done because of Floresita's family history of breast, colon, and prostate cancer.    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 This variant is called n.165G>A (RNA change). No other variants or mutations were found in the TERC gene. Given the uncertain significance of this result, medical management decisions should NOT be made based on this test result alone.    Of note, Floresita is negative for mutations in the AIP, ALK, APC, RANJEET, AXIN2, BAP1, BARD1, BLM, BMPR1A, BRCA1, BRCA2, BRIP1, CASR, CDC73, CDH1, CDK4, CDKN1B, CDKN1C, CDKN2A, CEBPA, CHEK2, CTNNA1, DICER1, DIS3L2, EGFR, EPCAM, FH, FLCN, GATA2, GPC3, GREM1, HOXB13,HRAS, KIT, MAX, MEN1, MET, MITF, MLH1, MSH2, MSH3, MSH6, MUTYH, NBN, NF1, NF2, NTHL1, PALB2, PHOX2B, PMS2, POLD1, POLE, POT1, NUUZY9K, PTCH1, PTEN, RAD50, RAD51C, RAD51D, RB1, RECQL4, RET, RUNX1, SDHA, SDHAF2, SDHB, SDHC, SDHD, SMAD4, SMARCA4, SMARCB1, SMARCE1, STK11, SUFU, TERT, KXUM644, TP53, TSC1, TSC2, VHL, WRN, and WT1 genes. No mutations were found in any of the other 83 genes analyzed. This test involved sequencing and deletion/duplication analysis of all genes with the exceptions of EPCAM and GREM1 (deletions/duplications only), MITF (only the status of the c.952G>A (p.E318K) alteration is analyzed and reported), and SDHA (sequencing only). We reviewed  "the autosomal dominant inheritance of these genes. Floresita cannot pass on a mutation in any of these genes to her children based on this test result. Mutations in these genes do not skip generations.      A copy of the test report can be found in the Laboratory tab, dated 9/8/23, and named \"LABORATORY MISCELLANEOUS ORDER\". The report is scanned in as a linked document.    Interpretation:  We discussed several different interpretations of this inconclusive test result. It is not clear if this variant in the TERC gene is associated with increased cancer risk.  1. This variant may be a benign change that does not increase cancer risk.  2. This variant may be a harmful mutation that causes Dyskeratosis Congenita.    TERC n.165G>A (RNA change)  Harmful mutations in the TERC gene are associated dyskeratosis congenita. Individuals with dyskeratosis congenita typically have at least two of the three following features: dysplastic (abnormally shaped) nails, lacy, reticular pigmentation on the upper chest and/or neck, and oral leukoplakia (white patches in the mouth). Other features associated with dyskeratosis congenita include bone marrow failure, myelodysplastic syndrome/ acute myeloid leukemia, early onset head/neck squamous cell carcinoma, pulmonary fibrosis, premature greying, developmental delay, and many other health issues. Floresita denied a personal and family history of the above features. This suggests that the chances that this variant is a harmful mutation in this family are low. We discussed that medical management decisions are NOT recommended for individuals with a VUS result.    Genetic testing labs are working to collect evidence about if this variant is harmful or benign, and they will contact me if it is reclassified. If this variant is determined to be a benign change, there may be a different gene or combination of genes and environment that are associated with the cancers in this family.    It is " also important to consider that her a close maternal relative with cancer may have had a mutation in one of the genes tested and she did not inherit it.     Inheritance:  We reviewed the autosomal dominant inheritance of this variant in the TERC gene. We discussed that Floresita has a 50% chance to pass this variant to each of her children. Likewise, each of her siblings has a 50% risk of having the same variant. Because it is unclear what, if any, risk is associated with this variant, clinical genetic testing for this TERC variant alone is not recommended for relatives.    Screening:  Based on this inconclusive test result, it is important for Floresita and her relatives to refer back to the family history for appropriate cancer screening.    Based on her personal and family history, Floresita has a 31.8% lifetime risk of developing breast cancer based on the RUDY model. As such, Floresita meets current National Comprehensive Cancer Network (NCCN) guidelines for high risk breast screening. This includes annual breast MRI in addition to annual mammogram beginning 10 year prior to the earliest breast cancer diagnosis. In addition, Floresita should be receiving clinical breast exams by her physician. We discussed that Floresita could participate in our Cancer Risk Management Program in which our nursing specialist provides an individual screening plan and assists with medical management. Floresita mentioned that this screening has already been recommended by her breast team and she has an MRI already lined up. She will contact me should she be interested in meeting with our nurse specialist in the future.  Other population cancer screening options, such as those recommended by the American Cancer Society and the National Comprehensive Cancer Network (NCCN), are also appropriate for Floresita and her family. These screening recommendations may change if there are changes to Floresita's personal and/or family history of  cancer. Final screening recommendations should be made by each individual's primary care provider.      Additional Testing Considerations:  Although Floresita's genetic testing result is inconclusive, other relatives may still carry a harmful gene mutation associated with breast, colon, and/or prostate cancer. Genetic counseling is recommended for other close maternal relatives with cancer to discuss genetic testing options. If any of these relatives do pursue genetic testing, Floresita is encouraged to contact me so that we may review the impact of their test results on her.    Summary:  We do not have an explanation for Floresita's family history of cancer. While no genetic changes were identified, Floresita may still be at risk for certain cancers due to family history, environmental factors, or other genetic causes not identified by this test.  Because of that, it is important that she continue with cancer screening based on her personal and family history as discussed above.    Genetic testing is rapidly advancing, and new cancer susceptibility genes will most likely be identified in the future. Therefore, I encouraged Floresita to contact me annually or if there are changes in her personal or family history. This may change how we assess her cancer risk, screening, and the testing we would offer.    Plan:  1.  A copy of the test results will be sent to Floresita.    2.  She plans to follow-up with her other providers.  3.  She should contact me periodically, or if her  family history changes, and she would like to know if there are additional screening or testing recommendations at that time.    4. I will attempt to contact Floresita if the laboratory informs me that this VUS has been reclassified.  This may change screening and testing recommendations for Floresita and her relatives.    If Floresita has any further questions, I encouraged her to contact me via [employer]Pros.    Time spent on video: 10  minutes    Jimbo Vazquez MS, [facility] [place]ensed, Certified Genetic Counselor      Virtual Visit Details    Type of service:  Video Visit     Originating Location (pt. Location): Home  Distant Location (provider location):  Off-site  Platform used for Video Visit: Ko      Again, thank you for allowing me to participate in the care of your patient.        Sincerely,        Jimbo Vazquez, GC

## 2023-10-04 NOTE — NURSING NOTE
Patient presents to clinic for diabetic follow up with management.  Medication Reconciliation: Complete    Francheska Stubbs LPN  10/4/2023 3:18 PM

## 2023-10-04 NOTE — NURSING NOTE
Is the patient currently in the state of [place]? YES    Visit mode:VIDEO    If the visit is dropped, the patient can be reconnected by: VIDEO VISIT: Text to cell phone:   Telephone Information:   Mobile [phone]       Will anyone else be joining the visit? NO  (If patient encounters technical issues they should call [phone]    How would you like to obtain your AVS? MyChart    Are changes needed to the allergy or medication list? No    Reason for visit: RECHECK    Linda COLLINS

## 2023-10-04 NOTE — TELEPHONE ENCOUNTER
Pt's insurance doesn't cover 3x 10 mg capsules/day. Re-issued Rx as 10 mg capsule + 20 mg capsule.    Jose Roberto Walls, PharmD  [facility]

## 2023-10-11 ENCOUNTER — TRANSCRIBE ORDERS (OUTPATIENT)
Dept: OTHER | Age: 53
End: 2023-10-11

## 2023-10-11 DIAGNOSIS — E10.9 TYPE 1 DIABETES MELLITUS WITHOUT COMPLICATION (H): Primary | ICD-10-CM

## 2023-10-16 NOTE — CONFIDENTIAL NOTE
RECORDS RECEIVED FROM: internal    DATE RECEIVED: 10.19.23    NOTES (FOR ALL VISITS) STATUS DETAILS   OFFICE NOTES from referring provider internal   Gustavo Heredia MD     MEDICATION LIST internal       LABS     DIABETES: HBGA1C, CREATININE, FASTING LIPIDS, MICROALBUMIN URINE, POTASSIUM, TSH, T4    THYROID: TSH, T4, CBC, THYRODLONULIN, TOTAL T3, FREE T4, CALCITONIN, CEA internal  Received labs- 9.8.23  CMP- 3.30.23  TSH- 3.7.23  T4- 3.7.23  HBGA1C- 11/8/22  Lipid- 8/2/22

## 2023-10-17 ENCOUNTER — HOSPITAL ENCOUNTER (OUTPATIENT)
Dept: MRI IMAGING | Facility: OTHER | Age: 53
Discharge: HOME | End: 2023-10-17
Attending: SURGERY | Admitting: SURGERY
Payer: COMMERCIAL

## 2023-10-17 ENCOUNTER — TELEPHONE (OUTPATIENT)
Dept: ENDOCRINOLOGY | Facility: CLINIC | Age: 53
End: 2023-10-17
Payer: COMMERCIAL

## 2023-10-17 DIAGNOSIS — Z80.3 FAMILY HISTORY OF BREAST CANCER: ICD-10-CM

## 2023-10-17 DIAGNOSIS — Z91.89 AT HIGH RISK FOR BREAST CANCER: ICD-10-CM

## 2023-10-17 DIAGNOSIS — Z98.890 HISTORY OF BREAST BIOPSY: ICD-10-CM

## 2023-10-17 DIAGNOSIS — R92.30 DENSE BREAST: ICD-10-CM

## 2023-10-17 DIAGNOSIS — Z12.39 SCREENING FOR BREAST CANCER USING NON-MAMMOGRAM MODALITY: ICD-10-CM

## 2023-10-17 PROCEDURE — 77049 MRI BREAST C-+ W/CAD BI: CPT

## 2023-10-17 PROCEDURE — 255N000002 HC RX 255 OP 636: Mod: JZ | Performed by: SURGERY

## 2023-10-17 PROCEDURE — A9575 INJ GADOTERATE MEGLUMI 0.1ML: HCPCS | Mod: JZ | Performed by: SURGERY

## 2023-10-17 RX ADMIN — GADOTERATE MEGLUMINE 16 ML: 376.9 INJECTION INTRAVENOUS at 08:45

## 2023-10-17 NOTE — TELEPHONE ENCOUNTER
Called patient and left voicemail. Patient has an appointment on  10/19/23 . Need patient to upload their Dexcom device to site for provider to review prior to their appointment.  Adriana Chong MA

## 2023-10-17 NOTE — PROGRESS NOTES
Outcome for 10/17/23 1:38 PM: Mychart message sent  Adriana Chong MA  Outcome for 10/17/23 1:38 PM: Left Voicemail   Adriana Chong MA  Outcome for 10/18/23 7:35 AM: Data obtained via [employer] website  Talia Dobbs LPN

## 2023-10-19 ENCOUNTER — VIRTUAL VISIT (OUTPATIENT)
Dept: ENDOCRINOLOGY | Facility: CLINIC | Age: 53
End: 2023-10-19
Payer: COMMERCIAL

## 2023-10-19 ENCOUNTER — PRE VISIT (OUTPATIENT)
Dept: ENDOCRINOLOGY | Facility: CLINIC | Age: 53
End: 2023-10-19

## 2023-10-19 VITALS — WEIGHT: 170 LBS | BODY MASS INDEX: 28.07 KG/M2

## 2023-10-19 DIAGNOSIS — E10.9 TYPE 1 DIABETES MELLITUS WITHOUT COMPLICATION (H): ICD-10-CM

## 2023-10-19 PROCEDURE — 99205 OFFICE O/P NEW HI 60 MIN: CPT | Mod: VID | Performed by: STUDENT IN AN ORGANIZED HEALTH CARE EDUCATION/TRAINING PROGRAM

## 2023-10-19 NOTE — NURSING NOTE
Is the patient currently in the state of [place]? YES    Visit mode:VIDEO    If the visit is dropped, the patient can be reconnected by: VIDEO VISIT: Text to cell phone:   Telephone Information:   Mobile [phone]       Will anyone else be joining the visit? NO  (If patient encounters technical issues they should call [phone])    How would you like to obtain your AVS? MyChart    Are changes needed to the allergy or medication list? No    Reason for visit: Consult and Video Visit    Emmanuelle COLLINS

## 2023-10-19 NOTE — LETTER
10/19/2023       RE: Floresita Hill  [address] [facility] [address]     Dear Colleague,    Thank you for referring your patient, Floresita Hill, to the [facility]OCRINOLOG[facility] [employer] [facility]. Please see a copy of my visit note below.    Outcome for 10/17/23 1:38 PM: Mychart message sent  Adriana Chong MA  Outcome for 10/17/23 1:38 PM: Left Voicemail   Adriana Chong MA  Outcome for 10/18/23 7:35 AM: Data obtained via [employer] website  Talia Dobbs LPN                 Endocrinology Clinic Visit 10/19/2023      Video-Visit Details    Type of service:  Video Visit    Joined the call at 10/19/2023, 8:10:24 am.  Left the call at 10/19/2023, 8:55:46 am.    Originating Location (pt. Location): Home        Distant Location (provider location):  Off-site    Mode of Communication:  Video Conference via [employer]    Physician has received verbal consent for a Video Visit from the patient? Yes    I spent a total of 60 minutes on the date of encounter reviewing medical records, evaluating the patient, coordinating care and documenting in the EHR, as detailed above.      NAME:  Floresita Hill  PCP:  Gustavo Alvarez  MRN:  [id number]  Reason for Consult:  DM1  Requesting Provider:  Gustavo Marvin    Chief Complaint     Chief Complaint   Patient presents with    Consult    Video Visit       History of Present Illness     Floresita Hill is a 53 year old female who is seen in video visit for DM1.    The patient was diagnosed with type 1 diabetes age 10. She was initially started on insulin pump in 2000. She had the current Tandem pump for the past 5 years. She never had any complications from her DM. No DKA since diagnosis. She had frustration with hypoglycemia while exercising. She walks 12-1pm and 5:30pm-7pm. She has been skipping carb coverage completely for her  meals.    Previous A1C in records reviewed.   Lab Results   Component Value Date    A1C 6.5 (H) 11/08/2022    A1C 6.4 (H) 08/02/2022    A1C 7.0 (H) 12/08/2021    A1C 7.1 (H) 06/25/2021    A1C 7.0 (H) 01/07/2021    A1C 6.9 (H) 07/02/2020    A1C 7.2 (H) 05/07/2020    A1C 7.8 (H) 04/10/2019       Weight is down 13 lbs by diet and exercise since 3/2023    Diabetes Care/Complications:   Eyes: July 2023 was her last eye exam, no DR  Kidneys: last urine alb was 8/2022, negative.  Nerves: she has peripheral neuropathy in her left foot, due to disc disease s/p laminectomy. Never had infections , ulcers or amputations.  Smoking: no  Blood Pressure: overall in control, had few episodes of high reading, normal BP at home.  Lipids:  on 8/2022. 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  Back up insulin: yes, lantus  Glucagon:     Previous DM related Hospitalization: no    Current treatment strategy:     Blood Glucose Monitoring:                   Diet: 2 meals: lunch at 12pm and dinner at 5:30  Snacks:3-4 pm a protein and carb  Sometimes snack at home  Drinks: no sugary drinks, 1 glass of alcohol once a week    Exercise: walks.    # Hashimoto hypothyroidism  Diagnosed 10 years ago, has been on lt4 since. Last TSH 3/2023 wnl.    Problem List     Patient Active Problem List   Diagnosis    Hashimoto's thyroiditis    Iron deficiency anemia    IUD (intrauterine device) in place    Type 1 diabetes mellitus without complication (H)    Rash    Chronic TMJ pain    Family history of breast cancer in sister    At high risk for breast cancer        Medications     Current Outpatient Medications   Medication    aspirin EC 81 MG EC tablet    blood glucose (CONTOUR NEXT TEST) test strip    Blood Glucose Monitoring Suppl (KOEZY BLOOD GLUCOSE MONITOR) W/DEVICE KIT    Continuous Blood Gluc Sensor (DEXCOM G6 SENSOR)  "MISC    Continuous Blood Gluc Sensor (DEXCOM G6 SENSOR) MISC    Continuous Blood Gluc Transmit (DEXCOM G6 TRANSMITTER) MISC    Continuous Blood Gluc Transmit (DEXCOM G6 TRANSMITTER) MISC    FLUoxetine (PROZAC) 10 MG capsule    FLUoxetine (PROZAC) 20 MG capsule    insulin glargine (LANTUS PEN) 100 UNIT/ML pen    insulin pen needle (B-D U/F) 31G X 5 MM miscellaneous    insulin syringe-needle U-100 (29G X 1/2\" 0.5 ML) 29G X 1/2\" 0.5 ML miscellaneous    levothyroxine (SYNTHROID/LEVOTHROID) 125 MCG tablet    NOVOLOG VIAL 100 UNIT/ML soln    triamcinolone (KENALOG) 0.1 % external cream     No current facility-administered medications for this visit.        Allergies     Allergies   Allergen Reactions    Codeine Unknown     Loses consciousness    Statins Muscle Pain (Myalgia)     Mild achiness, may try again in future       Medical / Surgical History     Past Medical History:   Diagnosis Date    DDD (degenerative disc disease), lumbar     Dysthymic disorder     Hashimoto's thyroiditis 03/29/2015    Iron deficiency anemia 11/16/2012    Type 1 diabetes mellitus without complications (H)     Dx at 10 years old,Uses [employer]tronic Insulin pump.     Past Surgical History:   Procedure Laterality Date    ARTHROTOMY TEMPOROMANDIBULAR JOINT  1985    BACK SURGERY  07/2020    hemilaminotomies    BREAST BIOPSY, RT/LT Left 04/12/2019    benign    COLONOSCOPY  05/24/2019    normal results, follow up 10 years    COLONOSCOPY N/A 05/24/2019    Procedure: COLONOSCOPY;  Surgeon: Maria Victoria Dubose MD;  Location: [facility] OR    DILATION AND CURETTAGE      x3; benign       Social History     Social History     Socioeconomic History    Marital status:      Spouse name: Not on file    Number of children: Not on file    Years of education: Not on file    Highest education level: Not on file   Occupational History    Not on file   Tobacco Use    Smoking status: Never     Passive exposure: Never    Smokeless tobacco: Never   Vaping Use    Vaping Use: Never " used   Substance and Sexual Activity    Alcohol use: Yes     Alcohol/week: 2.0 standard drinks of alcohol     Types: 2 Glasses of wine per week     Comment: reduced, maybe one or two drinks a week    Drug use: No    Sexual activity: Not Currently     Partners: Male   Other Topics Concern    Parent/sibling w/ CABG, MI or angioplasty before 65F 55M? Not Asked   Social History Narrative    , moved here from [place].   Sense of humor intact.   works at [employer] as  at pro [employer].  [place] - .  2 kids, Rebecca (1999) lives in [place] (DE/marketing), Olga (2002) works in STEM; graduated from [employer] in spring 2023.  Works in [employer] at [employer].       Social Determinants of Health     Financial Resource Strain: Low Risk  (10/2/2023)    Financial Resource Strain     Within the past 12 months, have you or your family members you live with been unable to get utilities (heat, electricity) when it was really needed?: No   Food Insecurity: Low Risk  (10/2/2023)    Food Insecurity     Within the past 12 months, did you worry that your food would run out before you got money to buy more?: No     Within the past 12 months, did the food you bought just not last and you didn t have money to get more?: No   Transportation Needs: Low Risk  (10/2/2023)    Transportation Needs     Within the past 12 months, has lack of transportation kept you from medical appointments, getting your medicines, non-medical meetings or appointments, work, or from getting things that you need?: No   Physical Activity: Not on file   Stress: Not on file   Social Connections: Not on file   Interpersonal Safety: Low Risk  (10/4/2023)    Interpersonal Safety     Do you feel physically and emotionally safe where you currently live?: Yes     Within the past 12 months, have you been hit, slapped, kicked or otherwise physically hurt by someone?: No     Within the past 12 months, have you been humiliated or emotionally abused in other ways by your  partner or ex-partner?: No   Housing Stability: Low Risk  (10/2/2023)    Housing Stability     Do you have housing? : Yes     Are you worried about losing your housing?: No       Family History     Family History   Problem Relation Age of Onset    Kidney Disease Mother         s/p transplant     Myasthenia gravis Mother     Lung Cancer Mother     Hypothyroidism Sister     Bipolar Disorder Sister     Migraines Sister     Breast Cancer Sister     Hearing Loss Daughter         bilateral    Other - See Comments Daughter         asd spectrum    Albinism Daughter     Anxiety Disorder Daughter     Anxiety Disorder Daughter     Breast Cancer Maternal Aunt         Cancer-breast       ROS     12 ROS completed, pertinent positive and negative in HPI    Physical Exam   Wt 77.1 kg (170 lb)   LMP 11/01/2020 (Approximate)   BMI 28.07 kg/m     GENERAL: Healthy, alert and no distress  EYES: Eyes grossly normal to inspection.  No discharge or erythema, or obvious scleral/conjunctival abnormalities.  RESP: No audible wheeze, cough, or visible cyanosis.  No visible retractions or increased work of breathing.    SKIN: Visible skin clear. No significant rash, abnormal pigmentation or lesions.  NEURO: Cranial nerves grossly intact.  Mentation and speech appropriate for age.  PSYCH: Mentation appears normal, affect normal/bright, judgement and insight intact, normal speech and appearance well-groomed.     Labs/Imaging     Pertinent Labs were reviewed and updated in EPIC and discussed briefly.  Radiology Results were  reviewed and updated in EPIC and discussed briefly.    Summary of recent findings:   Lab Results   Component Value Date    A1C 6.5 11/08/2022    A1C 6.4 08/02/2022    A1C 7.0 12/08/2021    A1C 7.1 06/25/2021    A1C 7.0 01/07/2021    A1C 6.9 07/02/2020    A1C 7.2 05/07/2020    A1C 7.8 04/10/2019       TSH   Date Value Ref Range Status   03/07/2023 1.21 0.30 - 4.20 uIU/mL Final   11/08/2022 5.99 (H) 0.30 - 4.20 uIU/mL Final  "  08/02/2022 11.09 (H) 0.40 - 4.00 mU/L Final     T4 Total   Date Value Ref Range Status   09/02/2016 7.96 6.09 - 12.23 ug/dL    11/18/2015 8.08 6.09 - 12.23 ug/dL    06/03/2015 7.32 6.09 - 12.23 ug/dL      T4 Free   Date Value Ref Range Status   05/07/2020 0.77 0.60 - 1.60 ng/dL Final   04/10/2019 0.81 0.60 - 1.60 ng/dL Final   03/02/2018 0.96 0.60 - 1.60 ng/dL Final   04/22/2015 1.70 (H) 0.58 - 1.64 ng/dL      Free T4   Date Value Ref Range Status   11/08/2022 1.23 0.90 - 1.70 ng/dL Final   08/02/2022 1.00 0.60 - 1.60 ng/dL Final       Creatinine   Date Value Ref Range Status   03/30/2023 0.79 0.51 - 0.95 mg/dL Final   06/25/2021 0.92 0.60 - 1.20 mg/dL Final       Recent Labs   Lab Test 08/02/22  0915 06/25/21  1135   CHOL 267* 275*   HDL 78 90   * 174*   TRIG 60 54       No results found for: \"[id number]\", \"[id number]\", \"[id number]\"    I personally reviewed the patient's outside records from [facility] EMR and Care Everywhere. Summary of pertinent findings in HPI.    Impression / Plan     1. Diabetes Mellitus: Type 1  Overall good control. Has frequent postprandial hyperglycemia due to not covering her meals prior to exercising given the fear of low. We discussed aiming a BG of 180 prior to exercise. Turning on exercise mode and taking half of her ICR if planning to exercise. She would need some help from DM educ. She is also interested in pump update.  For now we will make changes to the active profile with ICR down to 1:20 at lunch and dinner.     2. Diabetes Complications: none, due      3. Blood Pressure Management: Blood pressure is controlled. Currently is not on pharmacotherapy for this.     4.Lipid Management: Per the new ACC/CLINT/NHLBI guidelines, statins are recommended for individuals with diabetes aged 40-75 with LDL  without ASCVD, and for any individual with ASCVD. Currently the patient is NOT on a statin. She is not sure about previous side effects, we will start with low dose lipitor.   "   5. Smoking Status: Patient Pt is smoke free..     6. Hypothyroidism: on lt4 125 mcg daily. Last sth wnl on 3/2023.      Review of the result(s) of each unique test - A1c and diabetes related labs.            Test and/or medications prescribed today:  No orders of the defined types were placed in this encounter.        Follow up: 3 month      Hermelindo Wesley MD  Endocrinology, Diabetes and Metabolism  [facility]

## 2023-10-19 NOTE — PROGRESS NOTES
Endocrinology Clinic Visit 10/19/2023      Video-Visit Details    Type of service:  Video Visit    Joined the call at 10/19/2023, 8:10:24 am.  Left the call at 10/19/2023, 8:55:46 am.    Originating Location (pt. Location): Home        Distant Location (provider location):  Off-site    Mode of Communication:  Video Conference via VersionOneWell    Physician has received verbal consent for a Video Visit from the patient? Yes    I spent a total of 60 minutes on the date of encounter reviewing medical records, evaluating the patient, coordinating care and documenting in the EHR, as detailed above.      NAME:  Floresita Hill  PCP:  Gustavo Alvarez  MRN:  [id number]  Reason for Consult:  DM1  Requesting Provider:  Gustavo Marvin    Chief Complaint     Chief Complaint   Patient presents with    Consult    Video Visit       History of Present Illness     Floresita Hill is a 53 year old female who is seen in video visit for DM1.    The patient was diagnosed with type 1 diabetes age 10. She was initially started on insulin pump in 2000. She had the current Tandem pump for the past 5 years. She never had any complications from her DM. No DKA since diagnosis. She had frustration with hypoglycemia while exercising. She walks 12-1pm and 5:30pm-7pm. She has been skipping carb coverage completely for her meals.    Previous A1C in records reviewed.   Lab Results   Component Value Date    A1C 6.5 (H) 11/08/2022    A1C 6.4 (H) 08/02/2022    A1C 7.0 (H) 12/08/2021    A1C 7.1 (H) 06/25/2021    A1C 7.0 (H) 01/07/2021    A1C 6.9 (H) 07/02/2020    A1C 7.2 (H) 05/07/2020    A1C 7.8 (H) 04/10/2019       Weight is down 13 lbs by diet and exercise since 3/2023    Diabetes Care/Complications:   Eyes: July 2023 was her last eye exam, no DR  Kidneys: last urine alb was 8/2022, negative.  Nerves: she has peripheral neuropathy in her left foot, due to disc disease s/p laminectomy. Never had infections ,  "ulcers or amputations.  Smoking: no  Blood Pressure: overall in control, had few episodes of high reading, normal BP at home.  Lipids:  on 8/2022. 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  Back up insulin: yes, lantus  Glucagon:     Previous DM related Hospitalization: no    Current treatment strategy:     Blood Glucose Monitoring:                   Diet: 2 meals: lunch at 12pm and dinner at 5:30  Snacks:3-4 pm a protein and carb  Sometimes snack at home  Drinks: no sugary drinks, 1 glass of alcohol once a week    Exercise: walks.    # Hashimoto hypothyroidism  Diagnosed 10 years ago, has been on lt4 since. Last TSH 3/2023 wnl.    Problem List     Patient Active Problem List   Diagnosis    Hashimoto's thyroiditis    Iron deficiency anemia    IUD (intrauterine device) in place    Type 1 diabetes mellitus without complication (H)    Rash    Chronic TMJ pain    Family history of breast cancer in sister    At high risk for breast cancer        Medications     Current Outpatient Medications   Medication    aspirin EC 81 MG EC tablet    blood glucose (CONTOUR NEXT TEST) test strip    Blood Glucose Monitoring Suppl (FamilyAppK BLOOD GLUCOSE MONITOR) W/DEVICE KIT    Continuous Blood Gluc Sensor (DEXCOM G6 SENSOR) MISC    Continuous Blood Gluc Sensor (DEXCOM G6 SENSOR) MISC    Continuous Blood Gluc Transmit (DEXCOM G6 TRANSMITTER) MISC    Continuous Blood Gluc Transmit (DEXCOM G6 TRANSMITTER) MISC    FLUoxetine (PROZAC) 10 MG capsule    FLUoxetine (PROZAC) 20 MG capsule    insulin glargine (LANTUS PEN) 100 UNIT/ML pen    insulin pen needle (B-D U/F) 31G X 5 MM miscellaneous    insulin syringe-needle U-100 (29G X 1/2\" 0.5 ML) 29G X 1/2\" 0.5 ML miscellaneous    levothyroxine (SYNTHROID/LEVOTHROID) 125 MCG tablet    NOVOLOG VIAL 100 UNIT/ML soln    triamcinolone (KENALOG) 0.1 % external cream     No " current facility-administered medications for this visit.        Allergies     Allergies   Allergen Reactions    Codeine Unknown     Loses consciousness    Statins Muscle Pain (Myalgia)     Mild achiness, may try again in future       Medical / Surgical History     Past Medical History:   Diagnosis Date    DDD (degenerative disc disease), lumbar     Dysthymic disorder     Hashimoto's thyroiditis 03/29/2015    Iron deficiency anemia 11/16/2012    Type 1 diabetes mellitus without complications (H)     Dx at 10 years old,Uses Medtronic Insulin pump.     Past Surgical History:   Procedure Laterality Date    ARTHROTOMY TEMPOROMANDIBULAR JOINT  1985    BACK SURGERY  07/2020    hemilaminotomies    BREAST BIOPSY, RT/LT Left 04/12/2019    benign    COLONOSCOPY  05/24/2019    normal results, follow up 10 years    COLONOSCOPY N/A 05/24/2019    Procedure: COLONOSCOPY;  Surgeon: Maria Victoria Dubose MD;  Location: [facility] OR    DILATION AND CURETTAGE      x3; benign       Social History     Social History     Socioeconomic History    Marital status:      Spouse name: Not on file    Number of children: Not on file    Years of education: Not on file    Highest education level: Not on file   Occupational History    Not on file   Tobacco Use    Smoking status: Never     Passive exposure: Never    Smokeless tobacco: Never   Vaping Use    Vaping Use: Never used   Substance and Sexual Activity    Alcohol use: Yes     Alcohol/week: 2.0 standard drinks of alcohol     Types: 2 Glasses of wine per week     Comment: reduced, maybe one or two drinks a week    Drug use: No    Sexual activity: Not Currently     Partners: Male   Other Topics Concern    Parent/sibling w/ CABG, MI or angioplasty before 65F 55M? Not Asked   Social History Narrative    , moved here from [place].   Sense of humor intact.   works at [employer] as  at [employer].  Eugene - .  2 Rebecca cruz (1999) lives in [place] ([place]), Olga (2002)  works in STEM; graduated from [employer] in spring 2023.  Works in [employer] at [employer].       Social Determinants of Health     Financial Resource Strain: Low Risk  (10/2/2023)    Financial Resource Strain     Within the past 12 months, have you or your family members you live with been unable to get utilities (heat, electricity) when it was really needed?: No   Food Insecurity: Low Risk  (10/2/2023)    Food Insecurity     Within the past 12 months, did you worry that your food would run out before you got money to buy more?: No     Within the past 12 months, did the food you bought just not last and you didn t have money to get more?: No   Transportation Needs: Low Risk  (10/2/2023)    Transportation Needs     Within the past 12 months, has lack of transportation kept you from medical appointments, getting your medicines, non-medical meetings or appointments, work, or from getting things that you need?: No   Physical Activity: Not on file   Stress: Not on file   Social Connections: Not on file   Interpersonal Safety: Low Risk  (10/4/2023)    Interpersonal Safety     Do you feel physically and emotionally safe where you currently live?: Yes     Within the past 12 months, have you been hit, slapped, kicked or otherwise physically hurt by someone?: No     Within the past 12 months, have you been humiliated or emotionally abused in other ways by your partner or ex-partner?: No   Housing Stability: Low Risk  (10/2/2023)    Housing Stability     Do you have housing? : Yes     Are you worried about losing your housing?: No       Family History     Family History   Problem Relation Age of Onset    Kidney Disease Mother         s/p transplant     Myasthenia gravis Mother     Lung Cancer Mother     Hypothyroidism Sister     Bipolar Disorder Sister     Migraines Sister     Breast Cancer Sister     Hearing Loss Daughter         bilateral    Other - See Comments Daughter         asd spectrum    Albinism Daughter     Anxiety Disorder Daughter      Anxiety Disorder Daughter     Breast Cancer Maternal Aunt         Cancer-breast       ROS     12 ROS completed, pertinent positive and negative in HPI    Physical Exam   Wt 77.1 kg (170 lb)   LMP 11/01/2020 (Approximate)   BMI 28.07 kg/m     GENERAL: Healthy, alert and no distress  EYES: Eyes grossly normal to inspection.  No discharge or erythema, or obvious scleral/conjunctival abnormalities.  RESP: No audible wheeze, cough, or visible cyanosis.  No visible retractions or increased work of breathing.    SKIN: Visible skin clear. No significant rash, abnormal pigmentation or lesions.  NEURO: Cranial nerves grossly intact.  Mentation and speech appropriate for age.  PSYCH: Mentation appears normal, affect normal/bright, judgement and insight intact, normal speech and appearance well-groomed.     Labs/Imaging     Pertinent Labs were reviewed and updated in EPIC and discussed briefly.  Radiology Results were  reviewed and updated in EPIC and discussed briefly.    Summary of recent findings:   Lab Results   Component Value Date    A1C 6.5 11/08/2022    A1C 6.4 08/02/2022    A1C 7.0 12/08/2021    A1C 7.1 06/25/2021    A1C 7.0 01/07/2021    A1C 6.9 07/02/2020    A1C 7.2 05/07/2020    A1C 7.8 04/10/2019       TSH   Date Value Ref Range Status   03/07/2023 1.21 0.30 - 4.20 uIU/mL Final   11/08/2022 5.99 (H) 0.30 - 4.20 uIU/mL Final   08/02/2022 11.09 (H) 0.40 - 4.00 mU/L Final     T4 Total   Date Value Ref Range Status   09/02/2016 7.96 6.09 - 12.23 ug/dL    11/18/2015 8.08 6.09 - 12.23 ug/dL    06/03/2015 7.32 6.09 - 12.23 ug/dL      T4 Free   Date Value Ref Range Status   05/07/2020 0.77 0.60 - 1.60 ng/dL Final   04/10/2019 0.81 0.60 - 1.60 ng/dL Final   03/02/2018 0.96 0.60 - 1.60 ng/dL Final   04/22/2015 1.70 (H) 0.58 - 1.64 ng/dL      Free T4   Date Value Ref Range Status   11/08/2022 1.23 0.90 - 1.70 ng/dL Final   08/02/2022 1.00 0.60 - 1.60 ng/dL Final       Creatinine   Date Value Ref Range Status  "  03/30/2023 0.79 0.51 - 0.95 mg/dL Final   06/25/2021 0.92 0.60 - 1.20 mg/dL Final       Recent Labs   Lab Test 08/02/22  0915 06/25/21  1135   CHOL 267* 275*   HDL 78 90   * 174*   TRIG 60 54       No results found for: \"[id number]\", \"[id number]\", \"[id number]\"    I personally reviewed the patient's outside records from [facility] EMR and Care Everywhere. Summary of pertinent findings in HPI.    Impression / Plan     1. Diabetes Mellitus: Type 1  Overall good control. Has frequent postprandial hyperglycemia due to not covering her meals prior to exercising given the fear of low. We discussed aiming a BG of 180 prior to exercise. Turning on exercise mode and taking half of her ICR if planning to exercise. She would need some help from DM educ. She is also interested in pump update.  For now we will make changes to the active profile with ICR down to 1:20 at lunch and dinner.     2. Diabetes Complications: none, due      3. Blood Pressure Management: Blood pressure is controlled. Currently is not on pharmacotherapy for this.     4.Lipid Management: Per the new ACC/CLINT/NHLBI guidelines, statins are recommended for individuals with diabetes aged 40-75 with LDL  without ASCVD, and for any individual with ASCVD. Currently the patient is NOT on a statin. She is not sure about previous side effects, we will start with low dose lipitor.     5. Smoking Status: Patient Pt is smoke free..     6. Hypothyroidism: on lt4 125 mcg daily. Last sth wnl on 3/2023.      Review of the result(s) of each unique test - A1c and diabetes related labs.            Test and/or medications prescribed today:  No orders of the defined types were placed in this encounter.        Follow up: 3 month      Hermelindo Wesley MD  Endocrinology, Diabetes and Metabolism  [facility]    "

## 2023-10-19 NOTE — PATIENT INSTRUCTIONS
It was nice meeting you    Today we discussed the following:  - start low dose statin: lipitor 10 mg daily  - make a lab appointment to check your lipid panel and get a urine alb checked  - make an appointment with diabetic educator  - change insulin to carb ratio to 1:20 at lunch and dinner time. Eventually this should only during exercise, adding an exercise profile would be best.

## 2023-10-20 ENCOUNTER — LAB (OUTPATIENT)
Dept: LAB | Facility: OTHER | Age: 53
End: 2023-10-20
Attending: STUDENT IN AN ORGANIZED HEALTH CARE EDUCATION/TRAINING PROGRAM
Payer: COMMERCIAL

## 2023-10-20 DIAGNOSIS — E10.9 TYPE 1 DIABETES MELLITUS WITHOUT COMPLICATION (H): ICD-10-CM

## 2023-10-20 LAB
CHOLEST SERPL-MCNC: 280 MG/DL
CREAT UR-MCNC: 260.5 MG/DL
HBA1C MFR BLD: 6.7 % (ref 4–6.2)
HDLC SERPL-MCNC: 105 MG/DL
HOLD SPECIMEN: NORMAL
LDLC SERPL CALC-MCNC: 167 MG/DL
MICROALBUMIN UR-MCNC: <12 MG/L
MICROALBUMIN/CREAT UR: NORMAL MG/G{CREAT}
NONHDLC SERPL-MCNC: 175 MG/DL
TRIGL SERPL-MCNC: 41 MG/DL
TSH SERPL DL<=0.005 MIU/L-ACNC: 0.64 UIU/ML (ref 0.3–4.2)

## 2023-10-20 PROCEDURE — 82570 ASSAY OF URINE CREATININE: CPT | Mod: ZL

## 2023-10-20 PROCEDURE — 36415 COLL VENOUS BLD VENIPUNCTURE: CPT | Mod: ZL

## 2023-10-20 PROCEDURE — 83036 HEMOGLOBIN GLYCOSYLATED A1C: CPT | Mod: ZL

## 2023-10-20 PROCEDURE — 84443 ASSAY THYROID STIM HORMONE: CPT | Mod: ZL

## 2023-10-20 PROCEDURE — 80061 LIPID PANEL: CPT | Mod: ZL

## 2023-10-23 ENCOUNTER — VIRTUAL VISIT (OUTPATIENT)
Dept: EDUCATION SERVICES | Facility: CLINIC | Age: 53
End: 2023-10-23
Attending: STUDENT IN AN ORGANIZED HEALTH CARE EDUCATION/TRAINING PROGRAM
Payer: COMMERCIAL

## 2023-10-23 DIAGNOSIS — E10.9 TYPE 1 DIABETES MELLITUS WITHOUT COMPLICATION (H): ICD-10-CM

## 2023-10-23 PROCEDURE — G0108 DIAB MANAGE TRN  PER INDIV: HCPCS | Mod: VID | Performed by: DIETITIAN, REGISTERED

## 2023-10-23 NOTE — PROGRESS NOTES
Diabetes Self-Management Education & Support    Presents for:      Type of service:  Video Visit    Originating Location (pt. Location): Other work  Distant Location (provider location): [facility] DIABETES EDUCATION [place]  Mode of Communication:  Video Conference via getbetter!    Video Start Time:  1:06  Video End Time (time video stopped): 1:46    How would patient like to obtain AVS? Stanley    Assessment Type:   REPORTS:      Insulin Pump Information      Education specific to insulin pump provided today on:   Activity/Exercise Mode In Tandem Tslim Control IQ and preventing exercise induced hypoglycmeai.  Sleep Mode in Tandem Tslim Control IQ and preventing overnight hypoglycemia.    Opportunities for ongoing education and support in diabetes-self management were discussed.    Pt verbalized understanding of concepts discussed and recommendations provided today.       Continue education with the following diabetes management concepts: Healthy Eating and Insulin Pump Concepts Problem solving with insulin pump therapy (BG monitoring; hypoglycemia signs/symptoms, treatment (glucagon) and prevention; hyperglycemia signs/symptoms, treatment and prevention; ketones, DKA signs/symptoms and prevention)    ASSESSMENT    Floresita program the Activity/Exercise Mode on her insulin pump (changing the carb ratio from 1:12 to 1:20g as instructed by her provider.  For the past 2 days, she started activity mode 30 minutes prior to exercise and her sugars did not drop low.    Floresita tried sleep mode over night last night and her sugars did not drop low.    Floresita has experienced a 13 lb weight loss over the last 6 months.  She desires further weight loss, but it has been stable likely due to treating frequent hypoglycemia and having to consume calories she would otherwise not need.    Floresita will also benefit from flattening her glucose curves after meals by starting each meal with a high fiber food, such  "as non starchy veggies and eating her carb foods last.  This may help her reduce her insulin needs and provoke further weight loss.      PLAN    Continue activity and sleep mode in your Tandem pump.    If 'waking the pump' continues to worsen, contact Tandem.  Eat foods in this order:  non-starchy veggies, protein, carbs.        Follow-up: In 1 month    See Care Plan for co-developed, patient-state behavior change goals.  AVS provided for patient today.      SUBJECTIVE/OBJECTIVE:  Accompanied by: Self  Focus of Visit: Problem Solving, Insulin Pump, Being Active  Type of Pump visit: Pump Review  Diabetes type: Type 1  Date of diagnosis: 10 years old  Disease course: Getting harder to manage  Diabetes management related comments/concerns: Desires weight loss, but having frequent hypoglycemia when walking after meals.  Provider would like her to program the 'Activity Mode' in her insulin pump.  Also interested in new pump technology - hers will be out of warranty in the next year.  Cultural Influences/Ethnic Background:  Not  or        Patient Problem List and Family Medical History reviewed for relevant medical history, current medical status, and diabetes risk factors.    Vitals:  LMP 11/01/2020 (Approximate)   Estimated body mass index is 28.07 kg/m  as calculated from the following:    Height as of 10/4/23: 1.657 m (5' 5.25\").    Weight as of 10/19/23: 77.1 kg (170 lb).   Last 3 BP:   BP Readings from Last 3 Encounters:   10/04/23 125/81   09/07/23 (!) 140/80   05/09/23 (!) 152/96       History   Smoking Status    Never   Smokeless Tobacco    Never       Labs:  Lab Results   Component Value Date    A1C 6.7 10/20/2023    A1C 7.1 06/25/2021     Lab Results   Component Value Date     03/30/2023     08/02/2022     06/25/2021     Lab Results   Component Value Date     10/20/2023     06/25/2021     HDL Cholesterol   Date Value Ref Range Status   06/25/2021 90 23 - 92 mg/dL " "Final     Direct Measure HDL   Date Value Ref Range Status   10/20/2023 105 >=50 mg/dL Final   ]  GFR Estimate   Date Value Ref Range Status   03/30/2023 88 >60 mL/min/1.73m2 Final     Comment:     eGFR calculated using 2021 CKD-EPI equation.   06/25/2021 64 >60 mL/min/[1.73_m2] Final     GFR Estimate If Black   Date Value Ref Range Status   06/25/2021 77 >60 mL/min/[1.73_m2] Final     Lab Results   Component Value Date    CR 0.79 03/30/2023    CR 0.92 06/25/2021     No results found for: \"MICROALBUMIN\"    Healthy Eating:  Meals include: Lunch, Dinner, Afternoon Snack  Lunch: Soup  Dinner: Meal with her   Snacks: cheese stick and apple.    Being Active:  Being Active Assessed Today: Yes  Exercise:: Yes  On average, minutes per day of exercise at this level: 60 (Walk after lunch and walk after dinner most days)  How intense was your typical exercise? : Moderate (like brisk walking)  Barrier to exercise: Other (low blood sugar)    Monitoring:  Blood Glucose Meter: CGM    Taking Medications:  Diabetes Medication(s)       Insulin       insulin glargine (LANTUS PEN) 100 UNIT/ML pen    Inject 20 Units Subcutaneous At Bedtime In case of pump failure     NOVOLOG VIAL 100 UNIT/ML soln    Use as directed with insulin pump; max daily dose of 45 units                 Problem Solving:  Problem Solving Assessed Today: Yes  Is the patient at risk for hypoglycemia?: Yes  Hypoglycemia Frequency: Daily  Hypoglycemia Treatment: Other food (fruit snacks)    Healthy Coping:  Stage of change: ACTION (Actively working towards change)  Patient Activation Measure Survey Score:       No data to display                  Care Plan and Education Provided:  Care Plan: Diabetes   Updates made by Kamini Jha since 10/23/2023 12:00 AM        Problem: HbA1C Not In Goal         Goal: Establish Regular Follow-Ups with PCP         Task: Discuss with PCP the recommended timing for patient's next follow up visit(s)    Responsible User: " Kamini Jha        Task: Discuss schedule for PCP visits with patient    Responsible User: Kamini Jha        Goal: Get HbA1C Level in Goal         Task: Educate patient on diabetes education self-management topics    Responsible User: Kamini Jha        Task: Educate patient on benefits of regular glucose monitoring    Responsible User: Kamini Jha        Task: Refer patient to appropriate extended care team member, as needed (Medication Therapy Management, Behavioral Health, Physical Therapy, etc.)    Responsible User: Kamini Jha        Task: Discuss diabetes treatment plan with patient    Responsible User: Kamini Jha        Problem: Diabetes Self-Management Education Needed to Optimize Self-Care Behaviors         Goal: Understand diabetes pathophysiology and disease progression         Task: Provide education on diabetes pathophysiology and disease progression specfic to patient's diabetes type    Responsible User: Kamini Jha        Goal: Healthy Eating - follow a healthy eating pattern for diabetes         Task: Provide education on portion control and consistency in amount, composition and timing of food intake    Responsible User: Kamini Jha        Task: Provide education on managing carbohydrate intake (carbohydrate counting, plate planning method, etc.)    Responsible User: Kamini Jha        Task: Provide education on weight management    Responsible User: Kamini Jha        Task: Provide education on heart healthy eating    Responsible User: Kamini Jha        Task: Provide education on eating out    Responsible User: Kamini Jha        Task: Develop individualized healthy eating plan with patient    Responsible User: Kamini Jha        Goal: Being Active - get regular physical activity, working up to at least 150 minutes per week         Task: Provide education on relationship of activity to glucose and precautions to take if at risk for low glucose  Completed 10/23/2023   Responsible User: Kamini Jha        Task: Discuss barriers to physical activity with patient    Responsible User: Kamini Jha        Task: Develop physical activity plan with patient    Responsible User: Kamini Jha        Task: Explore community resources including walking groups, assistance programs, and home videos    Responsible User: Kamini Jha        Goal: Monitoring - monitor glucose and ketones as directed         Task: Provide education on blood glucose monitoring (purpose, proper technique, frequency, glucose targets, interpreting results, when to use glucose control solution, sharps disposal)    Responsible User: Kamini Jha        Task: Provide education on continuous glucose monitoring (sensor placement, use of brittney or /reader, understanding glucose trends, alerts and alarms, differences between sensor glucose and blood glucose)    Responsible User: Kamini Jha        Task: Provide education on ketone monitoring (when to monitor, frequency, etc.)    Responsible User: Kamini Jha        Goal: Taking Medication - patient is consistently taking medications as directed         Task: Provide education on action of prescribed medication, including when to take and possible side effects    Responsible User: Kamini Jha        Task: Provide education on insulin and injectable diabetes medications, including administration, storage, site selection and rotation for injection sites    Responsible User: Kamini Jha        Task: Discuss barriers to medication adherence with patient and provide management technique ideas as appropriate    Responsible User: Kamini Jha        Task: Provide education on frequency and refill details of medications    Responsible User: Kamini Jha        Goal: Problem Solving - know how to prevent and manage short-term diabetes complications         Task: Provide education on high blood glucose - causes,  signs/symptoms, prevention and treatment    Responsible User: Kamini Jha        Task: Provide education on low blood glucose - causes, signs/symptoms, prevention, treatment, carrying a carbohydrate source at all times, and medical identification Completed 10/23/2023   Responsible User: Kamini Jha        Task: Provide education on safe travel with diabetes    Responsible User: Kamini Jha        Task: Provide education on how to care for diabetes on sick days    Responsible User: Kamini Jha        Task: Provide education on when to call a health care provider    Responsible User: Kamini Jha        Goal: Reducing Risks - know how to prevent and treat long-term diabetes complications         Task: Provide education on major complications of diabetes, prevention, early diagnostic measures and treatment of complications    Responsible User: Kamini Jha        Task: Provide education on recommended care for dental, eye and foot health    Responsible User: Kamini Jha        Task: Provide education on Hemoglobin A1c - goals and relationship to blood glucose levels    Responsible User: Kamini Jha        Task: Provide education on recommendations for heart health - lipid levels and goals, blood pressure and goals, and aspirin therapy, if indicated    Responsible User: Kamini Jha        Task: Provide education on tobacco cessation    Responsible User: Kamini Jha        Goal: Healthy Coping - use available resources to cope with the challenges of managing diabetes         Task: Discuss recognizing feelings about having diabetes    Responsible User: Kamini Jha        Task: Provide education on the benefits of making appropriate lifestyle changes    Responsible User: Kamini Jha        Task: Provide education on benefits of utilizing support systems    Responsible User: Kamini Jha        Task: Discuss methods for coping with stress    Responsible User: Kamini Jha         Task: Provide education on when to seek professional counseling    Responsible User: Kamini Jha RDN, GABRIELLA, [facility]  Dietitian and Diabetes  - Endocrinology  [facility]      Time Spent: 30 minutes  Encounter Type: Individual    Any diabetes medication dose changes were made via the CDE Protocol per the patient's referring provider. A copy of this encounter was shared with the provider.

## 2023-10-23 NOTE — PATIENT INSTRUCTIONS
It was nice meeting with you today Floresita!    Here is an overview/PLAN from today's visit:     Continue activity and sleep mode in your Tandem pump.    If 'waking the pump' continues to worsen, contact Tandem.  Eat foods in this order:  non-starchy veggies, protein, carbs.          Follow-up: In 1 month, December 4th @ 2:00 pm.  (Have a Great Thanksgiving!)          Thank you!    Kamini Jha RDN, GABRIELLA, SHARI   [facility]     [facility] Diabetes Education for [facility] ([facility] [place] [facility]):    For Your Diabetes Education Appointments Call:  [phone]  For Your Diabetes Education Questions, send a MyChart or Call  [phone]  A team member will reply to you in 1-2 business days   For urgent questions after hours call:   Phone: [phone]     If you need a medication refill please contact your pharmacy. Please allow 3 business days for your refills to be completed.

## 2023-11-05 ENCOUNTER — HEALTH MAINTENANCE LETTER (OUTPATIENT)
Age: 53
End: 2023-11-05

## 2023-11-06 ENCOUNTER — MYC MEDICAL ADVICE (OUTPATIENT)
Dept: FAMILY MEDICINE | Facility: OTHER | Age: 53
End: 2023-11-06
Payer: COMMERCIAL

## 2023-11-06 DIAGNOSIS — E10.9 TYPE 1 DIABETES MELLITUS WITHOUT COMPLICATION (H): ICD-10-CM

## 2023-11-06 RX ORDER — INSULIN ASPART 100 [IU]/ML
INJECTION, SOLUTION INTRAVENOUS; SUBCUTANEOUS
Qty: 50 ML | Refills: 4 | Status: SHIPPED | OUTPATIENT
Start: 2023-11-06 | End: 2024-08-05

## 2023-12-19 ENCOUNTER — VIRTUAL VISIT (OUTPATIENT)
Dept: EDUCATION SERVICES | Facility: CLINIC | Age: 53
End: 2023-12-19
Payer: COMMERCIAL

## 2023-12-19 VITALS — WEIGHT: 170 LBS | BODY MASS INDEX: 28.07 KG/M2

## 2023-12-19 DIAGNOSIS — E10.9 TYPE 1 DIABETES MELLITUS WITHOUT COMPLICATION (H): Primary | ICD-10-CM

## 2023-12-19 PROCEDURE — G0108 DIAB MANAGE TRN  PER INDIV: HCPCS | Mod: VID | Performed by: DIETITIAN, REGISTERED

## 2023-12-19 NOTE — PROGRESS NOTES
Diabetes Self-Management Education & Support    Presents for:  follow-up for blood sugar review.    Floresita has Type 1 Diabetes and is on the Tandem Control IQ insulin pump.  This is the second time I have seen her.  She is working towards weight loss by walking twice a day, tho work has been busy and this is only happening 1-2 times a week.  As a result, sugars have been higher.    Had an unexplained low sugar on 12.15.23    Takes insulin before meals.  Tries to eat the non-carb foods first, but many of the meals she eats are mixed meals.  Does count carbohydrates.    Uses activity mode for when she does walk.      REPORTS:          Insulin Pump Information        Education specific to insulin pump provided today on:     Identifying blood sugar patterns.    Opportunities for ongoing education and support in diabetes-self management were discussed.    Pt verbalized understanding of concepts discussed and recommendations provided today.       Continue education with the following diabetes management concepts: Healthy Eating and Insulin Pump Concepts Problem solving with insulin pump therapy (BG monitoring; hypoglycemia signs/symptoms, treatment (glucagon) and prevention; hyperglycemia signs/symptoms, treatment and prevention; ketones, DKA signs/symptoms and prevention)    ASSESSMENT    Glucose Sensor Reports Reveal:    Floresita Hill's overall average is 161 mg/dL (CV 38%, increased from 33%), 66% TIR (decreased from 75%) ( mg/dl), 2% low, 0% very low, 22% high, 10% very high over the past two weeks.      Blood sugars show a pattern of decreasing and dropping low in the late evening and overnight.  Will benefit from reducing basal rates slightly, starting with 0.025 unit(s) / hr change.    Blood sugars show a pattern of rising around 7 pm.  This is the time Floresita showers and she admits to disconnecting for 20 - 30 minutes.  She charges her pump at this time.  Recommend either reconnecting  within 10 min OR delivering a 0.3 unit bolus to cover the time she is without her basal insulin .    Floresita has experienced a 13 lb weight loss over the last 6 months.  She desires further weight loss, but it has been stable likely due to treating frequent hypoglycemia and having to consume calories she would otherwise not need.    Floresita will also benefit from flattening her glucose curves after meals by starting each meal with a high fiber food, such as non starchy veggies and eating her carb foods last.  This may help her reduce her insulin needs and provoke further weight loss.      PLAN    To prevent your sugars from rising after you shower every morning, try giving a 0.3 unit bolus before you disconnect to shower OR try resuming the insulin pump within 10 minutes of disconnecting.  Today we reduced your basal rate by 0.025 units from 10 pm to 6 am.  New basal rates are as follows:   Midnight: 0.625 unit(s)/hr (reduced from 0.650)   6am:  0.750 unit(s)/hr   10am:  0.750 unit(s)/hr   10 pm:  0.725 unit(s)/hr (reduced from 0.750)  3.  Eat foods in this order:  non-starchy veggies, protein, carbs.  If you are eating a mixed meal.  Have a veggie starter, such as a salad.  4.  Resume walking twice a day most days.  Use activity mode on your pump.  Start activity mode 30 minutes before the start of exercise.      Follow-up: In 1 month    See Care Plan for co-developed, patient-state behavior change goals.  AVS provided for patient today.      SUBJECTIVE/OBJECTIVE:  Accompanied by: Self  Focus of Visit: Problem Solving, Insulin Pump, Being Active  Type of Pump visit: Pump Review  Diabetes type: Type 1  Date of diagnosis: 10 years old  Disease course: Getting harder to manage  Diabetes management related comments/concerns: Sugars variable, likely due to less time exercising and more time working.  Cultural Influences/Ethnic Background:  Not  or        Patient Problem List and Family Medical History  "reviewed for relevant medical history, current medical status, and diabetes risk factors.    Vitals:  Wt 77.1 kg (170 lb)   LMP 11/01/2020 (Approximate)   BMI 28.07 kg/m    Estimated body mass index is 28.07 kg/m  as calculated from the following:    Height as of 10/4/23: 1.657 m (5' 5.25\").    Weight as of this encounter: 77.1 kg (170 lb).   Last 3 BP:   BP Readings from Last 3 Encounters:   10/04/23 125/81   09/07/23 (!) 140/80   05/09/23 (!) 152/96       History   Smoking Status    Never   Smokeless Tobacco    Never       Labs:  Lab Results   Component Value Date    A1C 6.7 10/20/2023    A1C 7.1 06/25/2021     Lab Results   Component Value Date     03/30/2023     08/02/2022     06/25/2021     Lab Results   Component Value Date     10/20/2023     06/25/2021     HDL Cholesterol   Date Value Ref Range Status   06/25/2021 90 23 - 92 mg/dL Final     Direct Measure HDL   Date Value Ref Range Status   10/20/2023 105 >=50 mg/dL Final   ]  GFR Estimate   Date Value Ref Range Status   03/30/2023 88 >60 mL/min/1.73m2 Final     Comment:     eGFR calculated using 2021 CKD-EPI equation.   06/25/2021 64 >60 mL/min/[1.73_m2] Final     GFR Estimate If Black   Date Value Ref Range Status   06/25/2021 77 >60 mL/min/[1.73_m2] Final     Lab Results   Component Value Date    CR 0.79 03/30/2023    CR 0.92 06/25/2021     No results found for: \"MICROALBUMIN\"    Healthy Eating:  Meals include: Lunch, Dinner, Afternoon Snack  Lunch: Soup  Dinner: Meal with her   Snacks: cheese stick and apple.    Being Active:  Being Active Assessed Today: Yes  Exercise:: Yes  On average, minutes per day of exercise at this level: 60 (Walk after lunch and walk after dinner, goal is most days)  How intense was your typical exercise? : Moderate (like brisk walking)  Barrier to exercise: Other (low blood sugar)    Monitoring:  Blood Glucose Meter: CGM    Taking Medications:  Diabetes Medication(s)       Insulin      "  insulin aspart (NOVOLOG VIAL) 100 UNITS/ML vial    Use as directed with insulin pump; max daily dose of 55     insulin glargine (LANTUS PEN) 100 UNIT/ML pen    Inject 20 Units Subcutaneous At Bedtime In case of pump failure                 Problem Solving:  Problem Solving Assessed Today: Yes  Is the patient at risk for hypoglycemia?: Yes  Hypoglycemia Frequency: 3-4 times a week  Hypoglycemia Treatment: Other food (fruit snacks)    Healthy Coping:  Stage of change: ACTION (Actively working towards change)  Patient Activation Measure Survey Score:       No data to display                  Kamini Jha RDN, LD, [facility] [place]  Dietitian and Diabetes  - Endocrinology  [facility]      Time Spent: 30 minutes  Encounter Type: Individual    Any diabetes medication dose changes were made via the CDE Protocol per the patient's referring provider. A copy of this encounter was shared with the provider.

## 2023-12-19 NOTE — NURSING NOTE
Is the patient currently in the state of [place]? YES    Visit mode:VIDEO    If the visit is dropped, the patient can be reconnected by: VIDEO VISIT: Text to cell phone:   Telephone Information:   Mobile [phone]       Will anyone else be joining the visit? NO  (If patient encounters technical issues they should call [phone])    How would you like to obtain your AVS? MyChart    Are changes needed to the allergy or medication list? No    Reason for visit: Diabetes Education, RECHECK, and Video Visit    Emmanuelle COLLINS

## 2023-12-19 NOTE — PROGRESS NOTES
Virtual Visit Details    Type of service:  Video Visit     Originating Location (pt. Location): Home    Distant Location (provider location):  On-site  Platform used for Video Visit: Ko

## 2023-12-19 NOTE — PATIENT INSTRUCTIONS
It was nice meeting with you today Floresita!  You are doing great work!    Here is a summary of our visit:    PLAN    To prevent your sugars from rising after you shower every morning, try giving a 0.3 unit bolus before you disconnect to shower OR try resuming the insulin pump within 10 minutes of disconnecting.  Today we reduced your basal rate by 0.025 units from 10 pm to 6 am.  New basal rates are as follows:   Midnight: 0.625 unit(s)/hr (reduced from 0.650)   6am:  0.750 unit(s)/hr   10am:  0.750 unit(s)/hr   10 pm:  0.725 unit(s)/hr (reduced from 0.750)  3.  Eat foods in this order:  non-starchy veggies, protein, carbs.  If you are eating a mixed meal.  Have a veggie starter, such as a salad.  4.  Resume walking twice a day most days.  Use activity mode on your pump.  Start activity mode 30 minutes before the start of exercise.    Follow Up in 1 month.    Reach out sooner if you experience 2-3 or more low sugars in 1 week.      Thank you!    Kamini Jha RDN, LD, [facility]     To ask a question to your Endocrine care team, please send them a TouchOfModern message, or reach them by phone at [phone]      To expedite your medication refill(s), please contact your pharmacy and have them   fax a refill request to: [phone].  *Please allow 3 business days for routine medication refills.  *Please allow 5 business days for controlled substance medication refills.     For after-hours urgent Endocrine issues, that do not require 911, please dial [phone], and ask to speak with the Endocrinologist On-Call

## 2024-01-05 ENCOUNTER — TELEPHONE (OUTPATIENT)
Dept: ENDOCRINOLOGY | Facility: CLINIC | Age: 54
End: 2024-01-05
Payer: COMMERCIAL

## 2024-01-08 NOTE — PROGRESS NOTES
Outcome for 01/08/24 10:37 AM: Data uploaded on Tandem  Adriana Chong MA  Outcome for 01/15/24 1:25 PM: Data obtained via Tandem website  Adriana Chong MA

## 2024-01-17 ENCOUNTER — VIRTUAL VISIT (OUTPATIENT)
Dept: ENDOCRINOLOGY | Facility: CLINIC | Age: 54
End: 2024-01-17
Payer: COMMERCIAL

## 2024-01-17 DIAGNOSIS — E10.9 TYPE 1 DIABETES MELLITUS WITHOUT COMPLICATION (H): Primary | ICD-10-CM

## 2024-01-17 PROCEDURE — 99215 OFFICE O/P EST HI 40 MIN: CPT | Mod: 95 | Performed by: STUDENT IN AN ORGANIZED HEALTH CARE EDUCATION/TRAINING PROGRAM

## 2024-01-17 NOTE — PROGRESS NOTES
Endocrinology Clinic Visit       Video-Visit Details    Type of service:  Video Visit    Joined the call at 1/17/2024, 10:20:21 am.  Left the call at 1/17/2024, 10:36:53 am.    Originating Location (pt. Location): Home        Distant Location (provider location):  On-site    Mode of Communication:  Video Conference via QompiumWell    Physician has received verbal consent for a Video Visit from the patient? Yes    40 minutes spent on the date of the encounter doing chart review, history and exam, documentation and further activities as noted above.  This time excludes time spent reviewing CGM.        NAME:  Floresita Hill  PCP:  Ashanti José Antonio Marvinsoledad  MRN:  [id number]  Reason for Consult:  DM1  Requesting Provider:  Gustavo Marvin    Chief Complaint     Chief Complaint   Patient presents with    RECHECK       History of Present Illness     Floresita Hill is a 53 year old female who is seen in video visit for DM1. Last seen 10/2023.    The patient was diagnosed with type 1 diabetes age 10. She was initially started on insulin pump in 2000. She had the current Tandem pump for the past 5 years. She never had any complications from her DM. No DKA since diagnosis. She had frustration with hypoglycemia while exercising. She walks 12-1pm and 5:30pm-7pm. She has been skipping carb coverage completely for her meals.  Since last visit she established with CDE; she started using exercise mode which is helping but still not bolusing for dinner after her exercise.    Previous A1C in records reviewed.   Lab Results   Component Value Date    A1C 6.7 (H) 10/20/2023    A1C 6.5 (H) 11/08/2022    A1C 6.4 (H) 08/02/2022    A1C 7.0 (H) 12/08/2021    A1C 7.1 (H) 06/25/2021    A1C 7.0 (H) 01/07/2021    A1C 6.9 (H) 07/02/2020    A1C 7.2 (H) 05/07/2020    A1C 7.8 (H) 04/10/2019       Weight is down 13 lbs by diet and exercise since 3/2023    Diabetes Care/Complications:   Eyes: July 2023 was her last eye  exam, no DR  Kidneys: last urine alb was 8/2022, negative.  Nerves: she has peripheral neuropathy in her left foot, due to disc disease s/p laminectomy. Never had infections , ulcers or amputations.  Smoking: no  Blood Pressure: overall in control, had few episodes of high reading, normal BP at home.  Lipids:  on 10/2023. She was on statin in the past she felt tingling in her feet and stopped it few years ago. Restarted on low dose lipitor 10/2023, rported mild muscles aches nothing major she said.  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  Back up insulin: yes, lantus  Glucagon:     Previous DM related Hospitalization: no    Current treatment strategy:     Blood Glucose Monitoring:                     Diet: 2 meals: lunch at 12pm and dinner at 5:30  Snacks:3-4 pm a protein and carb  Sometimes snack at home  Drinks: no sugary drinks, 1 glass of alcohol once a week    Exercise: walks.    # Hashimoto hypothyroidism  Diagnosed 10 years ago, has been on lt4 since. Last TSH 10/2023 wnl.    Problem List     Patient Active Problem List   Diagnosis    Hashimoto's thyroiditis    Iron deficiency anemia    IUD (intrauterine device) in place    Type 1 diabetes mellitus without complication (H)    Rash    Chronic TMJ pain    Family history of breast cancer in sister    At high risk for breast cancer        Medications     Current Outpatient Medications   Medication    aspirin EC 81 MG EC tablet    atorvastatin (LIPITOR) 10 MG tablet    blood glucose (CONTOUR NEXT TEST) test strip    Blood Glucose Monitoring Suppl (SURECHEK BLOOD GLUCOSE MONITOR) W/DEVICE KIT    Continuous Blood Gluc Sensor (DEXCOM G6 SENSOR) MISC    Continuous Blood Gluc Sensor (DEXCOM G6 SENSOR) MISC    Continuous Blood Gluc Transmit (DEXCOM G6 TRANSMITTER) MISC    Continuous Blood Gluc Transmit (DEXCOM G6 TRANSMITTER) MISC    FLUoxetine (PROZAC) 10 MG capsule    FLUoxetine (PROZAC) 20 MG capsule    insulin aspart  "(NOVOLOG VIAL) 100 UNITS/ML vial    insulin glargine (LANTUS PEN) 100 UNIT/ML pen    insulin pen needle (B-D U/F) 31G X 5 MM miscellaneous    insulin syringe-needle U-100 (29G X 1/2\" 0.5 ML) 29G X 1/2\" 0.5 ML miscellaneous    levothyroxine (SYNTHROID/LEVOTHROID) 125 MCG tablet    triamcinolone (KENALOG) 0.1 % external cream     No current facility-administered medications for this visit.        Allergies     Allergies   Allergen Reactions    Codeine Unknown     Loses consciousness    Statins Muscle Pain (Myalgia)     Mild achiness, may try again in future       Medical / Surgical History     Past Medical History:   Diagnosis Date    DDD (degenerative disc disease), lumbar     Dysthymic disorder     Hashimoto's thyroiditis 03/29/2015    Iron deficiency anemia 11/16/2012    Type 1 diabetes mellitus without complications (H)     Dx at 10 years old,Uses Medtronic Insulin pump.     Past Surgical History:   Procedure Laterality Date    ARTHROTOMY TEMPOROMANDIBULAR JOINT  1985    BACK SURGERY  07/2020    hemilaminotomies    BREAST BIOPSY, RT/LT Left 04/12/2019    benign    COLONOSCOPY  05/24/2019    normal results, follow up 10 years    COLONOSCOPY N/A 05/24/2019    Procedure: COLONOSCOPY;  Surgeon: Maria Victoria Dubose MD;  Location: [facility] OR    DILATION AND CURETTAGE      x3; benign       Social History     Social History     Socioeconomic History    Marital status:      Spouse name: Not on file    Number of children: Not on file    Years of education: Not on file    Highest education level: Not on file   Occupational History    Not on file   Tobacco Use    Smoking status: Never     Passive exposure: Never    Smokeless tobacco: Never   Vaping Use    Vaping Use: Never used   Substance and Sexual Activity    Alcohol use: Yes     Alcohol/week: 2.0 standard drinks of alcohol     Types: 2 Glasses of wine per week     Comment: reduced, maybe one or two drinks a week    Drug use: No    Sexual activity: Not Currently     Partners: " Male   Other Topics Concern    Parent/sibling w/ CABG, MI or angioplasty before 65F 55M? Not Asked   Social History Narrative    , moved here from [place].   Sense of humor intact.   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  [place]ug[place] - .  2 kids, Rebecca (1999) lives in [place] (MT/marketing), Olga (2002) works in STEM; graduated from [employer] in spring 2023.  Works in [employer] at [employer].       Social Determinants of Health     Financial Resource Strain: Low Risk  (10/2/2023)    Financial Resource Strain     Within the past 12 months, have you or your family members you live with been unable to get utilities (heat, electricity) when it was really needed?: No   Food Insecurity: Low Risk  (10/2/2023)    Food Insecurity     Within the past 12 months, did you worry that your food would run out before you got money to buy more?: No     Within the past 12 months, did the food you bought just not last and you didn t have money to get more?: No   Transportation Needs: Low Risk  (10/2/2023)    Transportation Needs     Within the past 12 months, has lack of transportation kept you from medical appointments, getting your medicines, non-medical meetings or appointments, work, or from getting things that you need?: No   Physical Activity: Not on file   Stress: Not on file   Social Connections: Not on file   Interpersonal Safety: Low Risk  (10/4/2023)    Interpersonal Safety     Do you feel physically and emotionally safe where you currently live?: Yes     Within the past 12 months, have you been hit, slapped, kicked or otherwise physically hurt by someone?: No     Within the past 12 months, have you been humiliated or emotionally abused in other ways by your partner or ex-partner?: No   Housing Stability: Low Risk  (10/2/2023)    Housing Stability     Do you have housing? : Yes     Are you worried about losing your housing?: No       Family History     Family History   Problem Relation Age of Onset    Kidney  Disease Mother         s/p transplant     Myasthenia gravis Mother     Lung Cancer Mother     Hypothyroidism Sister     Bipolar Disorder Sister     Migraines Sister     Breast Cancer Sister     Hearing Loss Daughter         bilateral    Other - See Comments Daughter         asd spectrum    Albinism Daughter     Anxiety Disorder Daughter     Anxiety Disorder Daughter     Breast Cancer Maternal Aunt         Cancer-breast       ROS     12 ROS completed, pertinent positive and negative in HPI    Physical Exam   LMP 11/01/2020 (Approximate)    GENERAL: Healthy, alert and no distress  EYES: Eyes grossly normal to inspection.  No discharge or erythema, or obvious scleral/conjunctival abnormalities.  RESP: No audible wheeze, cough, or visible cyanosis.  No visible retractions or increased work of breathing.    SKIN: Visible skin clear. No significant rash, abnormal pigmentation or lesions.  NEURO: Cranial nerves grossly intact.  Mentation and speech appropriate for age.  PSYCH: Mentation appears normal, affect normal/bright, judgement and insight intact, normal speech and appearance well-groomed.     Labs/Imaging     Pertinent Labs were reviewed and updated in EPIC and discussed briefly.  Radiology Results were  reviewed and updated in EPIC and discussed briefly.    Summary of recent findings:   Lab Results   Component Value Date    A1C 6.5 11/08/2022    A1C 6.4 08/02/2022    A1C 7.0 12/08/2021    A1C 7.1 06/25/2021    A1C 7.0 01/07/2021    A1C 6.9 07/02/2020    A1C 7.2 05/07/2020    A1C 7.8 04/10/2019       TSH   Date Value Ref Range Status   10/20/2023 0.64 0.30 - 4.20 uIU/mL Final   03/07/2023 1.21 0.30 - 4.20 uIU/mL Final   11/08/2022 5.99 (H) 0.30 - 4.20 uIU/mL Final   08/02/2022 11.09 (H) 0.40 - 4.00 mU/L Final     T4 Total   Date Value Ref Range Status   09/02/2016 7.96 6.09 - 12.23 ug/dL    11/18/2015 8.08 6.09 - 12.23 ug/dL    06/03/2015 7.32 6.09 - 12.23 ug/dL      T4 Free   Date Value Ref Range Status  "  05/07/2020 0.77 0.60 - 1.60 ng/dL Final   04/10/2019 0.81 0.60 - 1.60 ng/dL Final   03/02/2018 0.96 0.60 - 1.60 ng/dL Final   04/22/2015 1.70 (H) 0.58 - 1.64 ng/dL      Free T4   Date Value Ref Range Status   11/08/2022 1.23 0.90 - 1.70 ng/dL Final   08/02/2022 1.00 0.60 - 1.60 ng/dL Final       Creatinine   Date Value Ref Range Status   03/30/2023 0.79 0.51 - 0.95 mg/dL Final   06/25/2021 0.92 0.60 - 1.20 mg/dL Final       Recent Labs   Lab Test 08/02/22  0915 06/25/21  1135   CHOL 267* 275*   HDL 78 90   * 174*   TRIG 60 54       No results found for: \"[id number]\", \"[id number]\", \"[id number]\"    I personally reviewed the patient's outside records from [facility] and Care Everywhere. Summary of pertinent findings in HPI.    Impression / Plan     1. Diabetes Mellitus: Type 1  Overall good control. Very happy with her glycemic control since using exercise mode. Still skipping ICR at dinner time after exercise, we discussed using half her ICR which would be what is active now for her exercise mode. She bothered by lows at night around 2-4 am, probably related to hyperglycemia post dinner. Discussed again insulin meal coverage at 15 min before meals. Could consider changing BG target overnight to 125.      2. Diabetes Complications: none,     3. Blood Pressure Management: Blood pressure is controlled. Currently is not on pharmacotherapy for this.     4.Lipid Management: Per the new ACC/CLINT/NHLBI guidelines, statins are recommended for individuals with diabetes aged 40-75 with LDL  without ASCVD, and for any individual with ASCVD. Started on low dose lipitor last vist. Will recheck labs next visit     5. Smoking Status: Patient Pt is smoke free..     6. Hypothyroidism: on lt4 125 mcg daily. Last sth wnl on 10/2023.      Review of the result(s) of each unique test - A1c and diabetes related labs.            Test and/or medications prescribed today:  Orders Placed This Encounter   Procedures    Lipid panel " reflex to direct LDL Fasting    TSH with free T4 reflex    Hemoglobin A1c         Follow up: 3 month      Hermelindo Wesley MD  Endocrinology, Diabetes and Metabolism  [facility]

## 2024-01-17 NOTE — NURSING NOTE
Is the patient currently in the state of [place]? YES    Visit mode:VIDEO    If the visit is dropped, the patient can be reconnected by: VIDEO VISIT: Text to cell phone:   Telephone Information:   Mobile [phone]    and VIDEO VISIT: Send to e-mail at: [email or web address]    Will anyone else be joining the visit? NO  (If patient encounters technical issues they should call [phone]    How would you like to obtain your AVS? MyChart    Are changes needed to the allergy or medication list? No    Reason for visit: GWEN COLLINS

## 2024-01-17 NOTE — LETTER
1/17/2024       RE: Floresita Hill  [address] [facility] [address]     Dear Colleague,    Thank you for referring your patient, Floresita Hill, to the [facility] ENDOCRINOLOGY CLINIC [place] at [facility]. Please see a copy of my visit note below.         Outcome for 01/08/24 10:37 AM: Data uploaded on Tandem  Adriana Chong MA  Outcome for 01/15/24 1:25 PM: Data obtained via Tandem website  Adriana Chong MA                  Endocrinology Clinic Visit       Video-Visit Details    Type of service:  Video Visit    Joined the call at 1/17/2024, 10:20:21 am.  Left the call at 1/17/2024, 10:36:53 am.    Originating Location (pt. Location): Home        Distant Location (provider location):  On-site    Mode of Communication:  Video Conference via check24    Physician has received verbal consent for a Video Visit from the patient? Yes    40 minutes spent on the date of the encounter doing chart review, history and exam, documentation and further activities as noted above.  This time excludes time spent reviewing CGM.        NAME:  Floresita Hill  PCP:  Gustavo Alvarez  MRN:  [id number]  Reason for Consult:  DM1  Requesting Provider:  Gustavo Marvin    Chief Complaint     Chief Complaint   Patient presents with    RECHECK       History of Present Illness     Floresita Hill is a 53 year old female who is seen in video visit for DM1. Last seen 10/2023.    The patient was diagnosed with type 1 diabetes age 10. She was initially started on insulin pump in 2000. She had the current Tandem pump for the past 5 years. She never had any complications from her DM. No DKA since diagnosis. She had frustration with hypoglycemia while exercising. She walks 12-1pm and 5:30pm-7pm. She has been skipping carb coverage completely for her meals.  Since last visit she established with CDE; she  started using exercise mode which is helping but still not bolusing for dinner after her exercise.    Previous A1C in records reviewed.   Lab Results   Component Value Date    A1C 6.7 (H) 10/20/2023    A1C 6.5 (H) 11/08/2022    A1C 6.4 (H) 08/02/2022    A1C 7.0 (H) 12/08/2021    A1C 7.1 (H) 06/25/2021    A1C 7.0 (H) 01/07/2021    A1C 6.9 (H) 07/02/2020    A1C 7.2 (H) 05/07/2020    A1C 7.8 (H) 04/10/2019       Weight is down 13 lbs by diet and exercise since 3/2023    Diabetes Care/Complications:   Eyes: July 2023 was her last eye exam, no DR  Kidneys: last urine alb was 8/2022, negative.  Nerves: she has peripheral neuropathy in her left foot, due to disc disease s/p laminectomy. Never had infections , ulcers or amputations.  Smoking: no  Blood Pressure: overall in control, had few episodes of high reading, normal BP at home.  Lipids:  on 10/2023. She was on statin in the past she felt tingling in her feet and stopped it few years ago. Restarted on low dose lipitor 10/2023, rported mild muscles aches nothing major she said.  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  Back up insulin: yes, lantus  Glucagon:     Previous DM related Hospitalization: no    Current treatment strategy:     Blood Glucose Monitoring:                     Diet: 2 meals: lunch at 12pm and dinner at 5:30  Snacks:3-4 pm a protein and carb  Sometimes snack at home  Drinks: no sugary drinks, 1 glass of alcohol once a week    Exercise: walks.    # Hashimoto hypothyroidism  Diagnosed 10 years ago, has been on lt4 since. Last TSH 10/2023 wnl.    Problem List     Patient Active Problem List   Diagnosis    Hashimoto's thyroiditis    Iron deficiency anemia    IUD (intrauterine device) in place    Type 1 diabetes mellitus without complication (H)    Rash    Chronic TMJ pain    Family history of breast cancer in sister    At high risk for breast cancer        Medications     Current Outpatient Medications  "  Medication    aspirin EC 81 MG EC tablet    atorvastatin (LIPITOR) 10 MG tablet    blood glucose (CONTOUR NEXT TEST) test strip    Blood Glucose Monitoring Suppl (SURECHEK BLOOD GLUCOSE MONITOR) W/DEVICE KIT    Continuous Blood Gluc Sensor (DEXCOM G6 SENSOR) MISC    Continuous Blood Gluc Sensor (DEXCOM G6 SENSOR) MISC    Continuous Blood Gluc Transmit (DEXCOM G6 TRANSMITTER) MISC    Continuous Blood Gluc Transmit (DEXCOM G6 TRANSMITTER) MISC    FLUoxetine (PROZAC) 10 MG capsule    FLUoxetine (PROZAC) 20 MG capsule    insulin aspart (NOVOLOG VIAL) 100 UNITS/ML vial    insulin glargine (LANTUS PEN) 100 UNIT/ML pen    insulin pen needle (B-D U/F) 31G X 5 MM miscellaneous    insulin syringe-needle U-100 (29G X 1/2\" 0.5 ML) 29G X 1/2\" 0.5 ML miscellaneous    levothyroxine (SYNTHROID/LEVOTHROID) 125 MCG tablet    triamcinolone (KENALOG) 0.1 % external cream     No current facility-administered medications for this visit.        Allergies     Allergies   Allergen Reactions    Codeine Unknown     Loses consciousness    Statins Muscle Pain (Myalgia)     Mild achiness, may try again in future       Medical / Surgical History     Past Medical History:   Diagnosis Date    DDD (degenerative disc disease), lumbar     Dysthymic disorder     Hashimoto's thyroiditis 03/29/2015    Iron deficiency anemia 11/16/2012    Type 1 diabetes mellitus without complications (H)     Dx at 10 years old,Uses Medtronic Insulin pump.     Past Surgical History:   Procedure Laterality Date    ARTHROTOMY TEMPOROMANDIBULAR JOINT  1985    BACK SURGERY  07/2020    hemilaminotomies    BREAST BIOPSY, RT/LT Left 04/12/2019    benign    COLONOSCOPY  05/24/2019    normal results, follow up 10 years    COLONOSCOPY N/A 05/24/2019    Procedure: COLONOSCOPY;  Surgeon: Maria Victoria Dubose MD;  Location: [facility] OR    DILATION AND CURETTAGE      x3; benign       Social History     Social History     Socioeconomic History    Marital status:      Spouse name: Not on file "    Number of children: Not on file    Years of education: Not on file    Highest education level: Not on file   Occupational History    Not on file   Tobacco Use    Smoking status: Never     Passive exposure: Never    Smokeless tobacco: Never   Vaping Use    Vaping Use: Never used   Substance and Sexual Activity    Alcohol use: Yes     Alcohol/week: 2.0 standard drinks of alcohol     Types: 2 Glasses of wine per week     Comment: reduced, maybe one or two drinks a week    Drug use: No    Sexual activity: Not Currently     Partners: Male   Other Topics Concern    Parent/sibling w/ CABG, MI or angioplasty before 65F 55M? Not Asked   Social History Narrative    , moved here from [place].   Sense of humor intact.   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  [place] - .  2 kids, Rebecca (1999) lives in [place] ([place]/mark[place]), Olga (2002) works in STEM; graduated from [employer] in spring 2023.  Works in [employer] at [employer].       Social Determinants of Health     Financial Resource Strain: Low Risk  (10/2/2023)    Financial Resource Strain     Within the past 12 months, have you or your family members you live with been unable to get utilities (heat, electricity) when it was really needed?: No   Food Insecurity: Low Risk  (10/2/2023)    Food Insecurity     Within the past 12 months, did you worry that your food would run out before you got money to buy more?: No     Within the past 12 months, did the food you bought just not last and you didn t have money to get more?: No   Transportation Needs: Low Risk  (10/2/2023)    Transportation Needs     Within the past 12 months, has lack of transportation kept you from medical appointments, getting your medicines, non-medical meetings or appointments, work, or from getting things that you need?: No   Physical Activity: Not on file   Stress: Not on file   Social Connections: Not on file   Interpersonal Safety: Low Risk  (10/4/2023)    Interpersonal Safety     Do you  feel physically and emotionally safe where you currently live?: Yes     Within the past 12 months, have you been hit, slapped, kicked or otherwise physically hurt by someone?: No     Within the past 12 months, have you been humiliated or emotionally abused in other ways by your partner or ex-partner?: No   Housing Stability: Low Risk  (10/2/2023)    Housing Stability     Do you have housing? : Yes     Are you worried about losing your housing?: No       Family History     Family History   Problem Relation Age of Onset    Kidney Disease Mother         s/p transplant     Myasthenia gravis Mother     Lung Cancer Mother     Hypothyroidism Sister     Bipolar Disorder Sister     Migraines Sister     Breast Cancer Sister     Hearing Loss Daughter         bilateral    Other - See Comments Daughter         asd spectrum    Albinism Daughter     Anxiety Disorder Daughter     Anxiety Disorder Daughter     Breast Cancer Maternal Aunt         Cancer-breast       ROS     12 ROS completed, pertinent positive and negative in HPI    Physical Exam   LMP 11/01/2020 (Approximate)    GENERAL: Healthy, alert and no distress  EYES: Eyes grossly normal to inspection.  No discharge or erythema, or obvious scleral/conjunctival abnormalities.  RESP: No audible wheeze, cough, or visible cyanosis.  No visible retractions or increased work of breathing.    SKIN: Visible skin clear. No significant rash, abnormal pigmentation or lesions.  NEURO: Cranial nerves grossly intact.  Mentation and speech appropriate for age.  PSYCH: Mentation appears normal, affect normal/bright, judgement and insight intact, normal speech and appearance well-groomed.     Labs/Imaging     Pertinent Labs were reviewed and updated in EPIC and discussed briefly.  Radiology Results were  reviewed and updated in EPIC and discussed briefly.    Summary of recent findings:   Lab Results   Component Value Date    A1C 6.5 11/08/2022    A1C 6.4 08/02/2022    A1C 7.0 12/08/2021     "A1C 7.1 06/25/2021    A1C 7.0 01/07/2021    A1C 6.9 07/02/2020    A1C 7.2 05/07/2020    A1C 7.8 04/10/2019       TSH   Date Value Ref Range Status   10/20/2023 0.64 0.30 - 4.20 uIU/mL Final   03/07/2023 1.21 0.30 - 4.20 uIU/mL Final   11/08/2022 5.99 (H) 0.30 - 4.20 uIU/mL Final   08/02/2022 11.09 (H) 0.40 - 4.00 mU/L Final     T4 Total   Date Value Ref Range Status   09/02/2016 7.96 6.09 - 12.23 ug/dL    11/18/2015 8.08 6.09 - 12.23 ug/dL    06/03/2015 7.32 6.09 - 12.23 ug/dL      T4 Free   Date Value Ref Range Status   05/07/2020 0.77 0.60 - 1.60 ng/dL Final   04/10/2019 0.81 0.60 - 1.60 ng/dL Final   03/02/2018 0.96 0.60 - 1.60 ng/dL Final   04/22/2015 1.70 (H) 0.58 - 1.64 ng/dL      Free T4   Date Value Ref Range Status   11/08/2022 1.23 0.90 - 1.70 ng/dL Final   08/02/2022 1.00 0.60 - 1.60 ng/dL Final       Creatinine   Date Value Ref Range Status   03/30/2023 0.79 0.51 - 0.95 mg/dL Final   06/25/2021 0.92 0.60 - 1.20 mg/dL Final       Recent Labs   Lab Test 08/02/22  0915 06/25/21  1135   CHOL 267* 275*   HDL 78 90   * 174*   TRIG 60 54       No results found for: \"[id number]\", \"[id number]\", \"[id number]\"    I personally reviewed the patient's outside records from [facility] EMR and Care Everywhere. Summary of pertinent findings in HPI.    Impression / Plan     1. Diabetes Mellitus: Type 1  Overall good control. Very happy with her glycemic control since using exercise mode. Still skipping ICR at dinner time after exercise, we discussed using half her ICR which would be what is active now for her exercise mode. She bothered by lows at night around 2-4 am, probably related to hyperglycemia post dinner. Discussed again insulin meal coverage at 15 min before meals. Could consider changing BG target overnight to 125.      2. Diabetes Complications: none,     3. Blood Pressure Management: Blood pressure is controlled. Currently is not on pharmacotherapy for this.     4.Lipid Management: Per the new ACC/CLINT/NHLBI " guidelines, statins are recommended for individuals with diabetes aged 40-75 with LDL  without ASCVD, and for any individual with ASCVD. Started on low dose lipitor last vist. Will recheck labs next visit     5. Smoking Status: Patient Pt is smoke free..     6. Hypothyroidism: on lt4 125 mcg daily. Last [facility] wnl on 10/2023.      Review of the result(s) of each unique test - A1c and diabetes related labs.            Test and/or medications prescribed today:  Orders Placed This Encounter   Procedures    Lipid panel reflex to direct LDL Fasting    TSH with free T4 reflex    Hemoglobin A1c         Follow up: 3 month      Hermelindo Wesley MD  Endocrinology, Diabetes and Metabolism  [facility]

## 2024-01-18 ENCOUNTER — TELEPHONE (OUTPATIENT)
Dept: ENDOCRINOLOGY | Facility: CLINIC | Age: 54
End: 2024-01-18
Payer: COMMERCIAL

## 2024-01-18 NOTE — TELEPHONE ENCOUNTER
1st attempt- LVM and sent mychart    Schedule 1/17 checkout order:    -Follow appointment in 3 months with Dr. Wesley (around 4/17/24)  -Lab appointment needed in April prior to follow up appointment

## 2024-01-24 ENCOUNTER — VIRTUAL VISIT (OUTPATIENT)
Dept: EDUCATION SERVICES | Facility: CLINIC | Age: 54
End: 2024-01-24
Payer: COMMERCIAL

## 2024-01-24 DIAGNOSIS — E10.9 TYPE 1 DIABETES MELLITUS WITHOUT COMPLICATION (H): Primary | ICD-10-CM

## 2024-01-24 PROCEDURE — G0108 DIAB MANAGE TRN  PER INDIV: HCPCS | Mod: 95 | Performed by: DIETITIAN, REGISTERED

## 2024-01-24 ASSESSMENT — PAIN SCALES - GENERAL: PAINLEVEL: NO PAIN (0)

## 2024-01-24 NOTE — PROGRESS NOTES
Virtual Visit Details    Type of service:  Video Visit     Originating Location (pt. Location): Home    Distant Location (provider location):  Off-site  Platform used for Video Visit: Ko    Diabetes Self-Management Education & Support    Presents for:  follow-up for blood sugar review.    Floresita has Type 1 Diabetes and is on the Tandem Control IQ insulin pump.  This is the third time I have seen her.  Floresita continues to experience overnight low sugars and has not been sleeping through the night.  She does not have symptoms of lows and relies on her sensor glucose alert to wake her up.    She is also working towards weight loss by walking twice a day, tho she didn't walk last week because of the cold.  Her weight is up 5 lbs, but down 10 lbs total.  She sees it starting to trend down again.  Floresita feels she is eating more than she'd like to be eating because of the frequent low sugars.    For exercise, she uses activity mode and a pump profile with a carb ratio of 50 g rather than 12 g 1 hour prior to her walk.  She walks after lunch and after dinner and aims to do this on her work days.    Takes insulin before meals.  Tries to eat the non-carb foods first, but many of the meals she eats are mixed meals.  Does count carbohydrates.    Floresita asks if she should upgrade her pump with the newest Tandem Software    REPORTS:          Insulin Pump Information        Education specific to insulin pump provided today on:     Identifying blood sugar patterns and how to prevent low sugars.  Tandem software upgrade - program time for exercise mode and G7 sensor.    Opportunities for ongoing education and support in diabetes-self management were discussed.    Pt verbalized understanding of concepts discussed and recommendations provided today.       Continue education with the following diabetes management concepts: Healthy Eating and Insulin Pump Concepts Problem solving with insulin pump therapy (BG  monitoring; hypoglycemia signs/symptoms, treatment (glucagon) and prevention; hyperglycemia signs/symptoms, treatment and prevention; ketones, DKA signs/symptoms and prevention)    ASSESSMENT    Glucose Sensor Reports Reveal:    Floresita Hill's overall average is 153 mg/dL (CV 38%), 73% TIR ( mg/dl), 2% low, 0% very low, 18% high, 7% very high over the past two weeks.      Average glucose and TIR tend to remain the same.    Blood sugars show a pattern of decreasing and dropping low in the late evening and overnight.  Will benefit from reducing basal rates at these times.    Blood sugars do drop low after exercise.  Floresita treats with 7 grams of carb but then find sugars go high.  She questions if lows after exercise will correct themselves since basal insulin is being stopped.  She is willing to experiment with not eating carbs to see if her sugars will naturally rise on their own.      Blood sugars show a pattern of rising @ 6am, provoking a higher pre-lunch and post-lunch sugar.  Recommend increasing basal insulin during this time.    Floresita is encouraged with a positive mindset and continued efforts to reduce insulin needs to facilitate weight loss.      PLAN    Today we reduced your basal rate by 0.50 units from 10 pm to 6 am.  We increased your basal rate by 0.50 from 6am to 10 am.  New basal rates are as follows:   Midnight: 0.575 unit(s)/hr (reduced from 0.625)   6am:  0.800 unit(s)/hr (increased from 0.750 unit(s)/hr)   10am:  0.750 unit(s)/hr   10 pm:  0.675 unit(s)/hr (reduced from 0.725)  2.  [place] with not eating 7 g of carb after exercise to see if sugar will level off versus rising.  3.  Continue working towards the habit of eating foods in this order:  non-starchy veggies, protein, carbs.  If you are eating a mixed meal.  Have a veggie starter, such as a salad.  4.  Continue walking twice a day most days.  Use activity mode and the exercise profile 1 hour before your  "walk.  5.  Do the software upgrade on your Tandem pump.  If you want to change to the G7 glucose sensor, contact your provider for the G7 prescription and request it is the G7 sensor that is compatible with the Tandem pump (it has a line under the LAR number).      Follow-up: In 3 weeks    See Care Plan for co-developed, patient-state behavior change goals.  AVS available in [email or web address].      SUBJECTIVE/OBJECTIVE:  Accompanied by: Self  Focus of Visit: Problem Solving, Insulin Pump, Being Active  Type of Pump visit: Pump Review  Diabetes type: Type 1  Date of diagnosis: 10 years old  Disease course: Getting harder to manage  Diabetes management related comments/concerns: SOvernight low sugars       Patient Problem List and Family Medical History reviewed for relevant medical history, current medical status, and diabetes risk factors.    Vitals:  LMP 11/01/2020 (Approximate)   Estimated body mass index is 28.07 kg/m  as calculated from the following:    Height as of 10/4/23: 1.657 m (5' 5.25\").    Weight as of 12/19/23: 77.1 kg (170 lb).   Last 3 BP:   BP Readings from Last 3 Encounters:   10/04/23 125/81   09/07/23 (!) 140/80   05/09/23 (!) 152/96       History   Smoking Status    Never   Smokeless Tobacco    Never       Labs:  Lab Results   Component Value Date    A1C 6.7 10/20/2023    A1C 7.1 06/25/2021     Lab Results   Component Value Date     03/30/2023     08/02/2022     06/25/2021     Lab Results   Component Value Date     10/20/2023     06/25/2021     HDL Cholesterol   Date Value Ref Range Status   06/25/2021 90 23 - 92 mg/dL Final     Direct Measure HDL   Date Value Ref Range Status   10/20/2023 105 >=50 mg/dL Final   ]  GFR Estimate   Date Value Ref Range Status   03/30/2023 88 >60 mL/min/1.73m2 Final     Comment:     eGFR calculated using 2021 CKD-EPI equation.   06/25/2021 64 >60 mL/min/[1.73_m2] Final     GFR Estimate If Black   Date Value Ref Range Status   06/25/2021 77 " ">60 mL/min/[1.73_m2] Final     Lab Results   Component Value Date    CR 0.79 03/30/2023    CR 0.92 06/25/2021     No results found for: \"MICROALBUMIN\"    Healthy Eating:  Meals include: Lunch, Dinner, Afternoon Snack  Lunch: Soup  Dinner: Meal with her   Snacks: cheese stick and apple.    Being Active:  Being Active Assessed Today: Yes  Exercise:: Yes  On average, minutes per day of exercise at this level: 60 (Walk after lunch and walk after dinner, goal is most days)  How intense was your typical exercise? : Moderate (like brisk walking)  Barrier to exercise: Other (low blood sugar)    Monitoring:  Blood Glucose Meter: CGM    Taking Medications:  Diabetes Medication(s)       Insulin       insulin aspart (NOVOLOG VIAL) 100 UNITS/ML vial Use as directed with insulin pump; max daily dose of 55     insulin glargine (LANTUS PEN) 100 UNIT/ML pen Inject 20 Units Subcutaneous At Bedtime In case of pump failure                 Problem Solving:  Problem Solving Assessed Today: Yes  Is the patient at risk for hypoglycemia?: Yes  Hypoglycemia Frequency: 3-4 times a week  Hypoglycemia Treatment: Other food (fruit snacks)    Healthy Coping:  Stage of change: ACTION (Actively working towards change)  Patient Activation Measure Survey Score:       No data to display                  Kamini Jha RDN, GABRIELLA, [facility] [place][note 2]  Dietitian and Diabetes  - Endocrinology  [facility]      Time Spent: 30 minutes  Encounter Type: Individual    Any diabetes medication dose changes were made via the CDE Protocol per the patient's referring provider. A copy of this encounter was shared with the provider.  "

## 2024-01-24 NOTE — NURSING NOTE
Is the patient currently in the state of [place]? YES    Visit mode:VIDEO    If the visit is dropped, the patient can be reconnected by: VIDEO VISIT: Send to e-mail at: [email or web address]    Will anyone else be joining the visit? NO  (If patient encounters technical issues they should call [phone]    How would you like to obtain your AVS? MyChart    Are changes needed to the allergy or medication list? No    Reason for visit: RECHECK Shelby Kocher VVF

## 2024-01-24 NOTE — PATIENT INSTRUCTIONS
It was nice meeting with you today Floresita!    Here is a summary of our visit:    PLAN    Today we reduced your basal rate by 0.50 units from 10 pm to 6 am.  We increased your basal rate by 0.50 from 6am to 10 am.  New basal rates are as follows:   Midnight: 0.575 unit(s)/hr (reduced from 0.625)   6am:  0.800 unit(s)/hr (increased from 0.750 unit(s)/hr)   10am:  0.750 unit(s)/hr   10 pm:  0.675 unit(s)/hr (reduced from 0.725)  2.  [place] with not eating 7 g of carb after exercise to see if sugar will level off versus rising.  3.  Continue working towards the habit of eating foods in this order:  non-starchy veggies, protein, carbs.  If you are eating a mixed meal.  Have a veggie starter, such as a salad.  4.  Continue walking twice a day most days.  Use activity mode and the exercise profile 1 hour before your walk.  5.  Do the software upgrade on your Tandem pump.  If you want to change to the G7 glucose sensor, contact your provider for the G7 prescription and request it is the G7 sensor that is compatible with the Tandem pump (it has a line under the LAR number).      Follow-up: In 3 weeks      Thank you!    Kamini Jha RDN, LD, [facility]     To ask a question to your Endocrine care team, please send them a [employer] message, or reach them by phone at [phone]    To schedule a Diabetes Education Visit, please contact our specialty schedulers at [phone]  To expedite your medication refill(s), please contact your pharmacy and have them   fax a refill request to: [phone].  *Please allow 3 business days for routine medication refills.  *Please allow 5 business days for controlled substance medication refills.     For after-hours urgent Endocrine issues, that do not require 911, please dial [phone], and ask to speak with the Endocrinologist On-Call

## 2024-02-06 ENCOUNTER — MYC MEDICAL ADVICE (OUTPATIENT)
Dept: FAMILY MEDICINE | Facility: OTHER | Age: 54
End: 2024-02-06
Payer: COMMERCIAL

## 2024-02-07 ENCOUNTER — VIRTUAL VISIT (OUTPATIENT)
Dept: FAMILY MEDICINE | Facility: OTHER | Age: 54
End: 2024-02-07
Attending: STUDENT IN AN ORGANIZED HEALTH CARE EDUCATION/TRAINING PROGRAM
Payer: COMMERCIAL

## 2024-02-07 DIAGNOSIS — E10.9 TYPE 1 DIABETES MELLITUS WITHOUT COMPLICATION (H): Primary | ICD-10-CM

## 2024-02-07 DIAGNOSIS — U07.1 INFECTION DUE TO 2019 NOVEL CORONAVIRUS: ICD-10-CM

## 2024-02-07 DIAGNOSIS — E06.3 HASHIMOTO'S THYROIDITIS: ICD-10-CM

## 2024-02-07 PROBLEM — M48.062 SPINAL STENOSIS, LUMBAR REGION WITH NEUROGENIC CLAUDICATION: Status: ACTIVE | Noted: 2020-07-07

## 2024-02-07 PROCEDURE — 99442 PR PHYSICIAN TELEPHONE EVALUATION 11-20 MIN: CPT | Mod: 93 | Performed by: NURSE PRACTITIONER

## 2024-02-07 RX ORDER — ALBUTEROL SULFATE 90 UG/1
2 AEROSOL, METERED RESPIRATORY (INHALATION) EVERY 4 HOURS PRN
Qty: 18 G | Refills: 0 | Status: SHIPPED | OUTPATIENT
Start: 2024-02-07 | End: 2024-08-28

## 2024-02-07 RX ORDER — BENZONATATE 100 MG/1
100 CAPSULE ORAL 3 TIMES DAILY PRN
Qty: 42 CAPSULE | Refills: 0 | Status: SHIPPED | OUTPATIENT
Start: 2024-02-07

## 2024-02-07 NOTE — PATIENT INSTRUCTIONS
For informational purposes only. Not to replace the advice of your health care provider. Copyright   2022 [facility]. All rights reserved. Clinically reviewed by Genesis Sandoval, PharmD, BCACP. [employer] [id number] - REV 06/23.  COVID-19 Outpatient Treatments  Your care team can help you find the best treatments for COVID-19. Talk to a health care provider or refer to the FDA medicine fact sheets below.  Paxlovid (nirmatrelvir and ritonavir): https://www.paxlovid.Prolify/resources  Molnupiravir (Lagevrio): https://www.fda.gov/media/668046/download  Important: We can only prescribe Paxlovid or Molnupiravir when it can be started within 5 days of first having symptoms.  Paxlovid (nimatrelvir and ritonavir)  How it works  Two medicines (nirmatrelvir and ritonavir) are taken together. They stop the virus from growing. Less amount of virus is easier for your body to fight.  Benefits  Lowers risk of a hospital stay or death from COVID-19.  How to take  Medicine comes in a daily container with both medicine tablets. Take by mouth twice daily (once in the morning, once at night) for 5 days.  The number of tablets to take varies by patient.  Don't chew or break capsules. Swallow whole.  When to take  Take it as soon as possible and within 5 days of your first symptoms.  Who can take it  Patients must be 12 years or older weigh at least 88 pounds. Paxlovid is the preferred treatment for pregnant patients.  Possible side effects  Can cause altered sense of taste, diarrhea (loose, watery stools), high blood pressure, muscle aches.  Medicine conflicts  Some medicines may conflict with Paxlovid and may cause serious side effects.  Tell your care team about all the medicines you take, including prescription and over-the-counter medicines, vitamins, and herbal supplements.  Your care team will review your medicines to make sure that you can safely take Paxlovid.  Cautions  Paxlovid is not advised for patients with severe  kidney or liver disease. If you have kidney or liver problems, the dose may need to be changed.  If you're pregnant or breastfeeding, talk to your care team about your options.  If you take hormonal birth control (such as the Pill), then you or your partner should also use a non-hormonal form of birth control (such as a condom). Keep doing this for 1 menstrual cycle after your last dose of Paxlovid.  Molnupiravir (lagevrio)  How it works  Stops the virus from growing. Less amount of virus is easier for your body to fight.  Benefits  Lowers risk of a hospital stay or death from COVID-19.  How to take  Take 4 capsules by mouth every 12 hours (4 in the morning and 4 at night) for 5 days. Don't chew or break capsules. Swallow whole.  When to take  Take as soon as possible and within 5 days of your first symptoms.  Who can take it  Patients must be 18 years or older.   Possible side effects  Diarrhea (loose, watery stools), nausea (feeling sick to your stomach), dizziness, headaches.  Medicine conflicts  Right now, there are no known conflicts with other drugs. But tell your care team about all medicines you take.  Cautions  This medicine is not advised for patients who are pregnant.  If you are someone who could become pregnant, use trusted birth control until 4 days after your last dose of molnupiravir.  If your partner could become pregnant, you should use trusted birth control until 3 months after your last dose of molnupiravir.

## 2024-02-07 NOTE — PROGRESS NOTES
"Floresita is a 53 year old who is being evaluated via a billable telephone visit.      What phone number would you like to be contacted at? [phone]  How would you like to obtain your AVS? Rolohart    Distant Location (provider location):  On-site    Subjective   Floresita is a 53 year old, presenting for the following health issues:  Covid Concern        9/7/2023    11:29 AM   Additional Questions   Roomed by Sherly Quintero LPN     HPI     Patient developed respiratory symptoms on 2/5/2004.  She is experiencing fever, chills, body aches associate with sore throat, headache and persistent cough.  No chest pain or shortness of breath.  She is using over-the-counter remedies to help manage her symptoms.  She is interested in antiviral treatment.  She states this is her first time having COVID and has not had antiviral treatment in the past.    COVID-19 Symptom Review  How many days ago did these symptoms start? 2 days ago, 2/5/2024    Are any of the following symptoms significant for you?  New or worsening difficulty breathing? No  Worsening cough? Yes, I am coughing up mucus.  Fever or chills? Yes, the highest temperature was 101  Headache: YES  Sore throat: YES  Chest pain: No  Diarrhea: No  Body aches? YES    What treatments has patient tried? Acetaminophen and Nonsteroidals   Does patient live in a nursing home, group home, or shelter? No  Does patient have a way to get food/medications during quarantined? Yes, I have a friend or family member who can help me.    Review of Systems  Constitutional, HEENT, cardiovascular, pulmonary, gi and gu systems are negative, except as otherwise noted.      Objective    Vitals - Patient Reported  Weight (Patient Reported): 77.1 kg (170 lb)  Height (Patient Reported): 167.6 cm (5' 6\")  BMI (Based on Pt Reported Ht/Wt): 27.44  Temperature (Patient Reported): 101  F (38.3  C)  Pain Score: Moderate Pain (5)  Pain Loc: Head      Physical Exam   General: Alert and no distress " //Respiratory: No audible wheeze, audible dry cough// Psychiatric:  Appropriate affect, tone, and pace of words    ASSESSMENT/PLAN  1. Type 1 diabetes mellitus without complication (H)  Stable.  Asymptomatic.  No medication changes made.  Continue medications as prescribed.    2. Hashimoto's thyroiditis  Stable.  Continue medications as prescribed.    3. Infection due to 2019 novel coronavirus  Patient tested positive for COVID-19 and is on day 2 of symptoms.  We reviewed CDC guidelines for isolation and quarantine.  We review etiology of COVID-19 and expected course of illness.  Patient is interested in eligible for antiviral treatment.  Kidney function is stable, she is not on any anticoagulation.  The only drug drug interaction is her atorvastatin which is advised to be held for the next 8 days.  Patient verbalizes understanding of this plan.  I have sent in prescription for albuterol inhaler to use as needed for cough and shortness of breath as well as Tessalon Perles to use as needed for cough.  Prescription for Paxlovid sent to pharmacy for her to begin today.  We review most, adverse side effects to monitor for, and when to return to the emergency room and for further evaluation if symptoms or not improving or worsening.    - albuterol (PROAIR HFA/PROVENTIL HFA/VENTOLIN HFA) 108 (90 Base) MCG/ACT inhaler; Inhale 2 puffs into the lungs every 4 hours as needed for cough or shortness of breath  Dispense: 18 g; Refill: 0  - benzonatate (TESSALON) 100 MG capsule; Take 1 capsule (100 mg) by mouth 3 times daily as needed for cough  Dispense: 42 capsule; Refill: 0  - nirmatrelvir and ritonavir (PAXLOVID) 300 mg/100 mg therapy pack; Take 3 tablets by mouth 2 times daily for 5 days (Take 2 Nirmatrelvir tablets and 1 Ritonavir tablet twice daily for 5 days)  Dispense: 30 tablet; Refill: 0    Phone call duration: 11 minutes.  Start time: 9:24 AM end time 9:35 AM  Signed Electronically by: Sada Way NP

## 2024-02-07 NOTE — NURSING NOTE
"Chief Complaint   Patient presents with    Covid Concern       Initial LMP 11/01/2020 (Approximate)   Breastfeeding No  Estimated body mass index is 28.07 kg/m  as calculated from the following:    Height as of 10/4/23: 1.657 m (5' 5.25\").    Weight as of 12/19/23: 77.1 kg (170 lb).  Medication Reconciliation: complete    Eun Chavez CMA    "

## 2024-03-06 ENCOUNTER — MYC MEDICAL ADVICE (OUTPATIENT)
Dept: FAMILY MEDICINE | Facility: OTHER | Age: 54
End: 2024-03-06
Payer: COMMERCIAL

## 2024-03-25 ENCOUNTER — OFFICE VISIT (OUTPATIENT)
Dept: FAMILY MEDICINE | Facility: OTHER | Age: 54
End: 2024-03-25
Attending: FAMILY MEDICINE
Payer: COMMERCIAL

## 2024-03-25 VITALS
HEART RATE: 79 BPM | DIASTOLIC BLOOD PRESSURE: 80 MMHG | OXYGEN SATURATION: 95 % | BODY MASS INDEX: 28.24 KG/M2 | TEMPERATURE: 96.9 F | WEIGHT: 171 LBS | RESPIRATION RATE: 16 BRPM | SYSTOLIC BLOOD PRESSURE: 122 MMHG

## 2024-03-25 DIAGNOSIS — M21.42 PES PLANUS OF LEFT FOOT: ICD-10-CM

## 2024-03-25 DIAGNOSIS — M76.72 PERONEAL TENDINITIS OF LEFT LOWER LEG: Primary | ICD-10-CM

## 2024-03-25 DIAGNOSIS — M21.41 PES PLANUS OF RIGHT FOOT: ICD-10-CM

## 2024-03-25 PROCEDURE — 99213 OFFICE O/P EST LOW 20 MIN: CPT | Performed by: FAMILY MEDICINE

## 2024-03-25 ASSESSMENT — PAIN SCALES - GENERAL: PAINLEVEL: MODERATE PAIN (5)

## 2024-03-25 NOTE — PROGRESS NOTES
Sports Medicine Office Note    HPI:  53-year-old female coming in for evaluation of left lower leg pain.  Her pain has been present for an unknown period of time, at least several months.  No inciting event or injury.  She has pain with walking.  This is her regular activity.  Walking on flat surfaces outdoors does not seem to be bothersome, however, while walking on a treadmill or on uneven surfaces outside door she will notice a pain on the lateral aspect of her lower leg.  She rates this pain at 5/10.  She characterized the pain as throbbing.  She has tried heat, ice, and over-the-counter medications to help.  She notes a previous history of a laminectomy in July 2022.  This was performed to treat symptoms of numbness involving the entire left lower extremity.  She does have some residual numbness into her toes.  She feels like her leg pain and the sensation in her feet are 2 separate entities.      EXAM:  /80 (BP Location: Right arm, Patient Position: Sitting, Cuff Size: Adult Large)   Pulse 79   Temp 96.9  F (36.1  C) (Temporal)   Resp 16   Wt 77.6 kg (171 lb)   LMP 11/01/2020 (Approximate)   SpO2 95%   BMI 28.24 kg/m    MUSCULOSKELETAL EXAM:  LEFT FOOT, ANKLE, AND LOWER LEG  Inspection:  -No gross deformity  -No bruising or swelling  -Mild pes planus bilaterally  -Slight ankle valgus  -Scars:  None    Tenderness to palpation of the:  -Fibular head:  Negative  -Fibular shaft: Mild pain  -Peroneal muscles: Mild pain at the mid lower leg  -Tibial shaft:  Negative  -Medial malleolus:  Negative  -Deltoid ligament:  Negative  -Lateral malleolus:  Negative  -ATFL:  Negative  -CFL:  Negative  -PTFL:  Negative  -Syndesmosis (AITFL):  Negative  -Midsubstance Achilles tendon:  Negative  -Achilles tendon insertion:  Negative    Range of Motion:  -Passive plantarflexion:  80 bilaterally  -Passive dorsiflexion:  5-10 left, 10 right    Strength:  -Dorsiflexion:  5/5  -Plantarflexion:  5/5  -Inversion:   5/5  -Eversion:  5/5  -Ability to walk on heels and toes:  Present    Special Tests:  -Anterior drawer test:  Negative  -Talar tilt test:  Negative  -Kleiger's external rotation test:  Negative  -Calcaneal squeeze test:  Negative    Other:  -Intact sensation to light touch distally.  -No signs of cyanosis. Normal skin temperature.  -Knee:  No gross deformity. Normal motion.  -Right foot/ankle:  No gross deformity. No palpable tenderness. Normal strength.      IMAGING:  None      ASSESSMENT/PLAN:  Diagnoses and all orders for this visit:  Peroneal tendinitis of left lower leg  -     Physical Therapy  Referral; Future  Pes planus of left foot  Pes planus of right foot    53-year-old female with peroneal strain/tendinopathy of the left lower extremity.  This is likely due to biomechanical deficits involving the kinetic chain of the bilateral lower extremities, particularly weakness of ankle and pelvis stabilizing musculature.  We discussed several options which may improve symptomatology including footwear modification, orthotics, physical therapy, etc.  - Referral placed to physical therapy  - Recommend over-the-counter shoe inserts  - Discussed possible footwear modification  - Follow-up as needed      Paul Alegria MD  3/25/2024  7:45 AM    Total time spent with this patient was 24 minutes which included chart review, visualization and independent interpretation of images, time spent with the patient, and documentation.    Procedure time:  0 minute(s)

## 2024-04-12 NOTE — PROGRESS NOTES
Outcome for 04/12/24 8:59 AM: Data uploaded on [employer]  Talia Dobbs LPN   Outcome for 04/18/24 3:35 PM: Data obtained via [employer] website  Adriana Chong MA

## 2024-04-15 ENCOUNTER — LAB (OUTPATIENT)
Dept: LAB | Facility: OTHER | Age: 54
End: 2024-04-15
Attending: STUDENT IN AN ORGANIZED HEALTH CARE EDUCATION/TRAINING PROGRAM
Payer: COMMERCIAL

## 2024-04-15 DIAGNOSIS — E10.9 TYPE 1 DIABETES MELLITUS WITHOUT COMPLICATION (H): ICD-10-CM

## 2024-04-15 LAB
CHOLEST SERPL-MCNC: 215 MG/DL
FASTING STATUS PATIENT QL REPORTED: YES
HBA1C MFR BLD: 6.6 % (ref 4–6.2)
HDLC SERPL-MCNC: 96 MG/DL
LDLC SERPL CALC-MCNC: 109 MG/DL
NONHDLC SERPL-MCNC: 119 MG/DL
TRIGL SERPL-MCNC: 50 MG/DL
TSH SERPL DL<=0.005 MIU/L-ACNC: 0.36 UIU/ML (ref 0.3–4.2)

## 2024-04-15 PROCEDURE — 83036 HEMOGLOBIN GLYCOSYLATED A1C: CPT | Mod: ZL

## 2024-04-15 PROCEDURE — 84443 ASSAY THYROID STIM HORMONE: CPT | Mod: ZL

## 2024-04-15 PROCEDURE — 36415 COLL VENOUS BLD VENIPUNCTURE: CPT | Mod: ZL

## 2024-04-15 PROCEDURE — 80061 LIPID PANEL: CPT | Mod: ZL

## 2024-04-22 ENCOUNTER — VIRTUAL VISIT (OUTPATIENT)
Dept: ENDOCRINOLOGY | Facility: CLINIC | Age: 54
End: 2024-04-22
Payer: COMMERCIAL

## 2024-04-22 DIAGNOSIS — E10.9 TYPE 1 DIABETES MELLITUS WITHOUT COMPLICATION (H): ICD-10-CM

## 2024-04-22 PROCEDURE — G2211 COMPLEX E/M VISIT ADD ON: HCPCS | Mod: 95 | Performed by: STUDENT IN AN ORGANIZED HEALTH CARE EDUCATION/TRAINING PROGRAM

## 2024-04-22 PROCEDURE — 99214 OFFICE O/P EST MOD 30 MIN: CPT | Mod: 95 | Performed by: STUDENT IN AN ORGANIZED HEALTH CARE EDUCATION/TRAINING PROGRAM

## 2024-04-22 RX ORDER — INSULIN GLARGINE 100 [IU]/ML
INJECTION, SOLUTION SUBCUTANEOUS
Qty: 15 ML | Refills: 1 | Status: SHIPPED | OUTPATIENT
Start: 2024-04-22

## 2024-04-22 RX ORDER — ATORVASTATIN CALCIUM 20 MG/1
10 TABLET, FILM COATED ORAL DAILY
Qty: 90 TABLET | Refills: 2 | Status: SHIPPED | OUTPATIENT
Start: 2024-04-22

## 2024-04-22 NOTE — NURSING NOTE
Is the patient currently in the state of [place]? YES    Visit mode:VIDEO    If the visit is dropped, the patient can be reconnected by: VIDEO VISIT: Text to cell phone:   Telephone Information:   Mobile [phone]       Will anyone else be joining the visit? NO  (If patient encounters technical issues they should call [phone] :[phone])    How would you like to obtain your AVS? MyChart    Are changes needed to the allergy or medication list? No patient reported no changes to e-check in information for visit. VF did not review e-check in information again with patient due to this.    Are refills needed on medications prescribed by this physician? Yes, back up insulin in case of pump failure    Reason for visit: RECHECK    Eun COLLINS

## 2024-04-22 NOTE — LETTER
4/22/2024       RE: Floresita Hill  [address] [facility] [id number]     Dear Colleague,    Thank you for referring your patient, Floresita Hill, to the [facility] ENDOCRINOLOGY [facility] [place] at [facility]. Please see a copy of my visit note below.    Outcome for 04/12/24 8:59 AM: Data uploaded on [employer]  Talia Dobbs LPN   Outcome for 04/18/24 3:35 PM: Data obtained via [employer] website  Adriana Chong MA                Endocrinology Clinic Visit       Video-Visit Details    Type of service:  Video Visit  Joined the call at 4/22/2024, 12:35:14 pm.  Left the call at 4/22/2024, 12:55:09 pm.    Originating Location (pt. Location): Home        Distant Location (provider location):  Off-site    Mode of Communication:  Video Conference via [employer]    Physician has received verbal consent for a Video Visit from the patient? Yes    40 minutes spent on the date of the encounter doing chart review, history and exam, documentation and further activities as noted above.  This time excludes time spent reviewing CGM.        NAME:  Floresita Hill  PCP:  Gustavo Alvarez  MRN:  [id number]  Reason for Consult:  DM1  Requesting Provider:  Gustavo Marvin    Chief Complaint     Chief Complaint   Patient presents with    RECHECK       History of Present Illness     Floresita Hill is a 53 year old female who is seen in video visit for DM1. Last seen 1/2024    The patient was diagnosed with type 1 diabetes age 10. She was initially started on insulin pump in 2000. She had the current Tandem pump for the past 5 years. She never had any complications from her DM. No DKA since diagnosis. She had frustration with hypoglycemia while exercising. She walks 12-1pm and 5:30pm-7pm. She was skipping carb coverage completely for her meals.  Since last visit she established with CDE; she started using  exercise mode which is helping. She had significant improvement in her hypoglycemia since then.    Previous A1C in records reviewed.   Lab Results   Component Value Date    A1C 6.6 (H) 04/15/2024    A1C 6.7 (H) 10/20/2023    A1C 6.5 (H) 2022    A1C 6.4 (H) 2022    A1C 7.0 (H) 2021    A1C 7.1 (H) 2021    A1C 7.0 (H) 2021    A1C 6.9 (H) 2020    A1C 7.2 (H) 2020    A1C 7.8 (H) 04/10/2019       Weight is stable  Wt Readings from Last 4 Encounters:   24 77.6 kg (171 lb)   23 77.1 kg (170 lb)   10/19/23 77.1 kg (170 lb)   10/04/23 78.9 kg (174 lb)           Diabetes Care/Complications:   Eyes: 2023 was her last eye exam, no DR  Kidneys: last urine alb was 10/2023, negative.  Nerves: she has peripheral neuropathy in her left foot, due to disc disease s/p laminectomy. Never had infections , ulcers or amputations.  Smoking: no  Blood Pressure: overall in control, had few episodes of high reading, normal BP at home.  Lipids:  on 10/2023. She was on statin in the past she felt tingling in her feet and stopped it few years ago. Restarted on low dose lipitor 10/2023, tolerated well.  Last LDL down to 109.  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  Back up insulin: yes, lantus. Reported that it is   Glucagon: not prescribed.    Previous DM related Hospitalization: no    Current treatment strategy:     Blood Glucose Monitoring:                                Diet: 2 meals: lunch at 12pm and dinner at 5:30  Snacks:3-4 pm a protein and carb  Sometimes snack at home  Drinks: no sugary drinks, 1 glass of alcohol once a week    Exercise: walks regularly, twice a day. After lunch and after dinner.    # Hashimoto hypothyroidism  Diagnosed 10 years ago, has been on lt4 since. Last TSH 2024 wnl.    Problem List     Patient Active Problem List   Diagnosis    Hashimoto's thyroiditis    Iron deficiency anemia    IUD (intrauterine  device) in place    Type 1 diabetes mellitus without complication (H)    Rash    Chronic TMJ pain    Family history of breast cancer in sister    At high risk for breast cancer    Spinal stenosis, lumbar region with neurogenic claudication        Medications     Current Outpatient Medications   Medication Sig Dispense Refill    atorvastatin (LIPITOR) 20 MG tablet Take 0.5 tablets (10 mg) by mouth daily 90 tablet 2    Glucagon (BAQSIMI) 3 MG/DOSE nasal powder Spray 1 spray (3 mg) in nostril as needed for low blood sugar in the event of unconscious hypoglycemia or hypoglycemic seizure. May repeat dose if no response after 15 minutes. 1 each 1    insulin glargine (LANTUS SOLOSTAR) 100 UNIT/ML pen Take 17 unit(s) in case of pump failure 15 mL 1    albuterol (PROAIR HFA/PROVENTIL HFA/VENTOLIN HFA) 108 (90 Base) MCG/ACT inhaler Inhale 2 puffs into the lungs every 4 hours as needed for cough or shortness of breath 18 g 0    aspirin EC 81 MG EC tablet Take 81 mg by mouth daily with food      benzonatate (TESSALON) 100 MG capsule Take 1 capsule (100 mg) by mouth 3 times daily as needed for cough 42 capsule 0    blood glucose (CONTOUR NEXT TEST) test strip 100 strips by In Vitro route 4 times daily (before meals and nightly) Use to test blood sugar 4 times daily before meals and in the morning or as directed. 450 strip 3    Blood Glucose Monitoring Suppl ([employer] - [place] BLOOD GLUCOSE MONITOR) W/DEVICE KIT Dispense glucose meter, test strips and lancets covered by the patient insurance. Test 5 times per day. 3 month supply with 4 refills.      Continuous Blood Gluc Sensor (DEXCOM G6 SENSOR) MISC Change every 10 days. 3 each 11    Continuous Blood Gluc Sensor (DEXCOM G6 SENSOR) MISC       Continuous Blood Gluc Transmit (DEXCOM G6 TRANSMITTER) MISC Change every 3 months. 1 each 3    Continuous Blood Gluc Transmit (DEXCOM G6 TRANSMITTER) MISC       FLUoxetine (PROZAC) 10 MG capsule Take 1 capsule by mouth daily with 20 mg capsule.  "Total daily dose = 30 mg. 90 capsule 4    FLUoxetine (PROZAC) 20 MG capsule Take 1 capsule by mouth daily with 10 mg capsule. Total daily dose = 30 mg. 90 capsule 4    insulin aspart (NOVOLOG VIAL) 100 UNITS/ML vial Use as directed with insulin pump; max daily dose of 55 50 mL 4    insulin glargine (LANTUS PEN) 100 UNIT/ML pen Inject 20 Units Subcutaneous At Bedtime In case of pump failure 15 mL 3    insulin pen needle (B-D U/F) 31G X 5 MM miscellaneous Use 1 pen needle daily as directed. 100 each prn    insulin syringe-needle U-100 (29G X 1/2\" 0.5 ML) 29G X 1/2\" 0.5 ML miscellaneous For administering insulin at home.      levothyroxine (SYNTHROID/LEVOTHROID) 125 MCG tablet Take 1 tablet (125 mcg) by mouth every morning (before breakfast) Increase in dose 90 tablet 1    triamcinolone (KENALOG) 0.1 % external cream Apply topically 2 times daily (Patient not taking: Reported on 2/7/2024) 80 g 3     No current facility-administered medications for this visit.        Allergies     Allergies   Allergen Reactions    Codeine Unknown     Loses consciousness    Statins Muscle Pain (Myalgia)     Mild achiness, may try again in future       Medical / Surgical History     Past Medical History:   Diagnosis Date    DDD (degenerative disc disease), lumbar     Dysthymic disorder     Hashimoto's thyroiditis 03/29/2015    Iron deficiency anemia 11/16/2012    Type 1 diabetes mellitus without complications (H)     Dx at 10 years old,Uses Medtronic Insulin pump.     Past Surgical History:   Procedure Laterality Date    ARTHROTOMY TEMPOROMANDIBULAR JOINT  1985    BACK SURGERY  07/2020    hemilaminotomies    BREAST BIOPSY, RT/LT Left 04/12/2019    benign    COLONOSCOPY  05/24/2019    normal results, follow up 10 years    COLONOSCOPY N/A 05/24/2019    Procedure: COLONOSCOPY;  Surgeon: Maria Victoria Dubose MD;  Location: [facility] OR    DILATION AND CURETTAGE      x3; benign       Social History     Social History     Socioeconomic History    Marital " status:      Spouse name: Not on file    Number of children: Not on file    Years of education: Not on file    Highest education level: Not on file   Occupational History    Not on file   Tobacco Use    Smoking status: Never     Passive exposure: Never    Smokeless tobacco: Never   Vaping Use    Vaping status: Never Used   Substance and Sexual Activity    Alcohol use: Yes     Alcohol/week: 2.0 standard drinks of alcohol     Types: 2 Glasses of wine per week     Comment: reduced, maybe one or two drinks a week    Drug use: No    Sexual activity: Not Currently     Partners: Male   Other Topics Concern    Parent/sibling w/ CABG, MI or angioplasty before 65F 55M? Not Asked   Social History Narrative    , moved here from [place].   Sense of humor intact.   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  [employer] .  2 kids, Rebecca (1999) lives in [place] (ME/marketing), Olga (2002) works in STEM; graduated from [employer] in spring 2023.  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       Social Determinants of Health     Financial Resource Strain: Low Risk  (10/2/2023)    Financial Resource Strain     Within the past 12 months, have you or your family members you live with been unable to get utilities (heat, electricity) when it was really needed?: No   Food Insecurity: Low Risk  (10/2/2023)    Food Insecurity     Within the past 12 months, did you worry that your food would run out before you got money to buy more?: No     Within the past 12 months, did the food you bought just not last and you didn t have money to get more?: No   Transportation Needs: Low Risk  (10/2/2023)    Transportation Needs     Within the past 12 months, has lack of transportation kept you from medical appointments, getting your medicines, non-medical meetings or appointments, work, or from getting things that you need?: No   Physical Activity: Not on file   Stress: Not on file   Social Connections: Not on file   Interpersonal Safety: Low Risk   (3/25/2024)    Interpersonal Safety     Do you feel physically and emotionally safe where you currently live?: Yes     Within the past 12 months, have you been hit, slapped, kicked or otherwise physically hurt by someone?: No     Within the past 12 months, have you been humiliated or emotionally abused in other ways by your partner or ex-partner?: No   Housing Stability: Low Risk  (10/2/2023)    Housing Stability     Do you have housing? : Yes     Are you worried about losing your housing?: No       Family History     Family History   Problem Relation Age of Onset    Kidney Disease Mother         s/p transplant     Myasthenia gravis Mother     Lung Cancer Mother     Hypothyroidism Sister     Bipolar Disorder Sister     Migraines Sister     Breast Cancer Sister     Hearing Loss Daughter         bilateral    Other - See Comments Daughter         asd spectrum    Albinism Daughter     Anxiety Disorder Daughter     Anxiety Disorder Daughter     Breast Cancer Maternal Aunt         Cancer-breast       ROS     12 ROS completed, pertinent positive and negative in HPI    Physical Exam   LMP 11/01/2020 (Approximate)    GENERAL: Healthy, alert and no distress  EYES: Eyes grossly normal to inspection.  No discharge or erythema, or obvious scleral/conjunctival abnormalities.  RESP: No audible wheeze, cough, or visible cyanosis.  No visible retractions or increased work of breathing.    SKIN: Visible skin clear. No significant rash, abnormal pigmentation or lesions.  NEURO: Cranial nerves grossly intact.  Mentation and speech appropriate for age.  PSYCH: Mentation appears normal, affect normal/bright, judgement and insight intact, normal speech and appearance well-groomed.     Labs/Imaging     Pertinent Labs were reviewed and updated in EPIC and discussed briefly.  Radiology Results were  reviewed and updated in EPIC and discussed briefly.    Summary of recent findings:   Lab Results   Component Value Date    A1C 6.5 11/08/2022     "A1C 6.4 08/02/2022    A1C 7.0 12/08/2021    A1C 7.1 06/25/2021    A1C 7.0 01/07/2021    A1C 6.9 07/02/2020    A1C 7.2 05/07/2020    A1C 7.8 04/10/2019       TSH   Date Value Ref Range Status   04/15/2024 0.36 0.30 - 4.20 uIU/mL Final   10/20/2023 0.64 0.30 - 4.20 uIU/mL Final   03/07/2023 1.21 0.30 - 4.20 uIU/mL Final   11/08/2022 5.99 (H) 0.30 - 4.20 uIU/mL Final   08/02/2022 11.09 (H) 0.40 - 4.00 mU/L Final     T4 Total   Date Value Ref Range Status   09/02/2016 7.96 6.09 - 12.23 ug/dL    11/18/2015 8.08 6.09 - 12.23 ug/dL    06/03/2015 7.32 6.09 - 12.23 ug/dL      T4 Free   Date Value Ref Range Status   05/07/2020 0.77 0.60 - 1.60 ng/dL Final   04/10/2019 0.81 0.60 - 1.60 ng/dL Final   03/02/2018 0.96 0.60 - 1.60 ng/dL Final   04/22/2015 1.70 (H) 0.58 - 1.64 ng/dL      Free T4   Date Value Ref Range Status   11/08/2022 1.23 0.90 - 1.70 ng/dL Final   08/02/2022 1.00 0.60 - 1.60 ng/dL Final       Creatinine   Date Value Ref Range Status   03/30/2023 0.79 0.51 - 0.95 mg/dL Final   06/25/2021 0.92 0.60 - 1.20 mg/dL Final       Recent Labs   Lab Test 08/02/22  0915 06/25/21  1135   CHOL 267* 275*   HDL 78 90   * 174*   TRIG 60 54       No results found for: \"[id number]\", \"[id number]\", \"[id number]\"    I personally reviewed the patient's outside records from epic EMR and Care Everywhere. Summary of pertinent findings in HPI.    Impression / Plan     1. Diabetes Mellitus: Type 1  good control. Very happy with her glycemic control since using exercise mode. Today she reported concern about pump failure and what is the back up plan. We discussed lantus/novolog MDI in case of pump failure. I prescribed lantus 17 unit(s) as back up plan. She has novolog and synringe. We reviewed her ICR in case of pump failure.  I also prescribed glucagon for her.     2. Diabetes Complications: none,     3. Blood Pressure Management: Blood pressure is controlled. Currently is not on pharmacotherapy for this.     4.Lipid Management: " Per the new ACC/CLINT/NHLBI guidelines, statins are recommended for individuals with diabetes aged 40-75 with LDL  without ASCVD, and for any individual with ASCVD. Started on low dose lipitor last vist. , lipitor tolerated , she is ok with increasing the dose to 20 mg      5. Smoking Status: Patient Pt is smoke free..     6. Hypothyroidism: on lt4 125 mcg daily. Last sth wnl on 4/2024.      Review of the result(s) of each unique test - A1c and diabetes related labs.      Test and/or medications prescribed today:  No orders of the defined types were placed in this encounter.    Follow up: 3 month with PA and 6 month with me.    The longitudinal plan of care for the diagnosis(es)/condition(s) as documented were addressed during this visit. Due to the added complexity in care, I will continue to support Floresita in the subsequent management and with ongoing continuity of care.    Hermelindo Wesley MD  Endocrinology, Diabetes and Metabolism  [facility]

## 2024-04-22 NOTE — PROGRESS NOTES
Endocrinology Clinic Visit       Video-Visit Details    Type of service:  Video Visit  Joined the call at 4/22/2024, 12:35:14 pm.  Left the call at 4/22/2024, 12:55:09 pm.    Originating Location (pt. Location): Home        Distant Location (provider location):  Off-site    Mode of Communication:  Video Conference via [employer]    Physician has received verbal consent for a Video Visit from the patient? Yes    40 minutes spent on the date of the encounter doing chart review, history and exam, documentation and further activities as noted above.  This time excludes time spent reviewing CGM.        NAME:  Floresita Hill  PCP:  Ashanti José Antonio Marvinsoledad  MRN:  [id number]  Reason for Consult:  DM1  Requesting Provider:  Gustavo Marvin    Chief Complaint     Chief Complaint   Patient presents with    RECHECK       History of Present Illness     Floresita Hill is a 53 year old female who is seen in video visit for DM1. Last seen 1/2024    The patient was diagnosed with type 1 diabetes age 10. She was initially started on insulin pump in 2000. She had the current Tandem pump for the past 5 years. She never had any complications from her DM. No DKA since diagnosis. She had frustration with hypoglycemia while exercising. She walks 12-1pm and 5:30pm-7pm. She was skipping carb coverage completely for her meals.  Since last visit she established with CDE; she started using exercise mode which is helping. She had significant improvement in her hypoglycemia since then.    Previous A1C in records reviewed.   Lab Results   Component Value Date    A1C 6.6 (H) 04/15/2024    A1C 6.7 (H) 10/20/2023    A1C 6.5 (H) 11/08/2022    A1C 6.4 (H) 08/02/2022    A1C 7.0 (H) 12/08/2021    A1C 7.1 (H) 06/25/2021    A1C 7.0 (H) 01/07/2021    A1C 6.9 (H) 07/02/2020    A1C 7.2 (H) 05/07/2020    A1C 7.8 (H) 04/10/2019       Weight is stable  Wt Readings from Last 4 Encounters:   03/25/24 77.6 kg (171 lb)   12/19/23  77.1 kg (170 lb)   10/19/23 77.1 kg (170 lb)   10/04/23 78.9 kg (174 lb)           Diabetes Care/Complications:   Eyes: 2023 was her last eye exam, no DR  Kidneys: last urine alb was 10/2023, negative.  Nerves: she has peripheral neuropathy in her left foot, due to disc disease s/p laminectomy. Never had infections , ulcers or amputations.  Smoking: no  Blood Pressure: overall in control, had few episodes of high reading, normal BP at home.  Lipids:  on 10/2023. She was on statin in the past she felt tingling in her feet and stopped it few years ago. Restarted on low dose lipitor 10/2023, tolerated well.  Last LDL down to 109.  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  Back up insulin: yes, lantus. Reported that it is   Glucagon: not prescribed.    Previous DM related Hospitalization: no    Current treatment strategy:     Blood Glucose Monitoring:                                Diet: 2 meals: lunch at 12pm and dinner at 5:30  Snacks:3-4 pm a protein and carb  Sometimes snack at home  Drinks: no sugary drinks, 1 glass of alcohol once a week    Exercise: walks regularly, twice a day. After lunch and after dinner.    # Hashimoto hypothyroidism  Diagnosed 10 years ago, has been on lt4 since. Last TSH 2024 wnl.    Problem List     Patient Active Problem List   Diagnosis    Hashimoto's thyroiditis    Iron deficiency anemia    IUD (intrauterine device) in place    Type 1 diabetes mellitus without complication (H)    Rash    Chronic TMJ pain    Family history of breast cancer in sister    At high risk for breast cancer    Spinal stenosis, lumbar region with neurogenic claudication        Medications     Current Outpatient Medications   Medication Sig Dispense Refill    atorvastatin (LIPITOR) 20 MG tablet Take 0.5 tablets (10 mg) by mouth daily 90 tablet 2    Glucagon (BAQSIMI) 3 MG/DOSE nasal powder Spray 1 spray (3 mg) in nostril as needed for low blood sugar in the  event of unconscious hypoglycemia or hypoglycemic seizure. May repeat dose if no response after 15 minutes. 1 each 1    insulin glargine (LANTUS SOLOSTAR) 100 UNIT/ML pen Take 17 unit(s) in case of pump failure 15 mL 1    albuterol (PROAIR HFA/PROVENTIL HFA/VENTOLIN HFA) 108 (90 Base) MCG/ACT inhaler Inhale 2 puffs into the lungs every 4 hours as needed for cough or shortness of breath 18 g 0    aspirin EC 81 MG EC tablet Take 81 mg by mouth daily with food      benzonatate (TESSALON) 100 MG capsule Take 1 capsule (100 mg) by mouth 3 times daily as needed for cough 42 capsule 0    blood glucose (CONTOUR NEXT TEST) test strip 100 strips by In Vitro route 4 times daily (before meals and nightly) Use to test blood sugar 4 times daily before meals and in the morning or as directed. 450 strip 3    Blood Glucose Monitoring Suppl ([employer] BLOOD GLUCOSE MONITOR) W/DEVICE KIT Dispense glucose meter, test strips and lancets covered by the patient insurance. Test 5 times per day. 3 month supply with 4 refills.      Continuous Blood Gluc Sensor (DEXCOM G6 SENSOR) MISC Change every 10 days. 3 each 11    Continuous Blood Gluc Sensor (DEXCOM G6 SENSOR) MISC       Continuous Blood Gluc Transmit (DEXCOM G6 TRANSMITTER) MISC Change every 3 months. 1 each 3    Continuous Blood Gluc Transmit (DEXCOM G6 TRANSMITTER) MISC       FLUoxetine (PROZAC) 10 MG capsule Take 1 capsule by mouth daily with 20 mg capsule. Total daily dose = 30 mg. 90 capsule 4    FLUoxetine (PROZAC) 20 MG capsule Take 1 capsule by mouth daily with 10 mg capsule. Total daily dose = 30 mg. 90 capsule 4    insulin aspart (NOVOLOG VIAL) 100 UNITS/ML vial Use as directed with insulin pump; max daily dose of 55 50 mL 4    insulin glargine (LANTUS PEN) 100 UNIT/ML pen Inject 20 Units Subcutaneous At Bedtime In case of pump failure 15 mL 3    insulin pen needle (B-D U/F) 31G X 5 MM miscellaneous Use 1 pen needle daily as directed. 100 each prn    insulin syringe-needle  "U-100 (29G X 1/2\" 0.5 ML) 29G X 1/2\" 0.5 ML miscellaneous For administering insulin at home.      levothyroxine (SYNTHROID/LEVOTHROID) 125 MCG tablet Take 1 tablet (125 mcg) by mouth every morning (before breakfast) Increase in dose 90 tablet 1    triamcinolone (KENALOG) 0.1 % external cream Apply topically 2 times daily (Patient not taking: Reported on 2/7/2024) 80 g 3     No current facility-administered medications for this visit.        Allergies     Allergies   Allergen Reactions    Codeine Unknown     Loses consciousness    Statins Muscle Pain (Myalgia)     Mild achiness, may try again in future       Medical / Surgical History     Past Medical History:   Diagnosis Date    DDD (degenerative disc disease), lumbar     Dysthymic disorder     Hashimoto's thyroiditis 03/29/2015    Iron deficiency anemia 11/16/2012    Type 1 diabetes mellitus without complications (H)     Dx at 10 years old,Uses Medtronic Insulin pump.     Past Surgical History:   Procedure Laterality Date    ARTHROTOMY TEMPOROMANDIBULAR JOINT  1985    BACK SURGERY  07/2020    hemilaminotomies    BREAST BIOPSY, RT/LT Left 04/12/2019    benign    COLONOSCOPY  05/24/2019    normal results, follow up 10 years    COLONOSCOPY N/A 05/24/2019    Procedure: COLONOSCOPY;  Surgeon: Maria Victoria Dubose MD;  Location: [facility] OR    DILATION AND CURETTAGE      x3; benign       Social History     Social History     Socioeconomic History    Marital status:      Spouse name: Not on file    Number of children: Not on file    Years of education: Not on file    Highest education level: Not on file   Occupational History    Not on file   Tobacco Use    Smoking status: Never     Passive exposure: Never    Smokeless tobacco: Never   Vaping Use    Vaping status: Never Used   Substance and Sexual Activity    Alcohol use: Yes     Alcohol/week: 2.0 standard drinks of alcohol     Types: 2 Glasses of wine per week     Comment: reduced, maybe one or two drinks a week    Drug use: No "    Sexual activity: Not Currently     Partners: Male   Other Topics Concern    Parent/sibling w/ CABG, MI or angioplasty before 65F 55M? Not Asked   Social History Narrative    , moved here from [place].   Sense of humor intact.   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  [place] - .  2 kids, Rebecca (1999) lives in [place] (MT/marketing), Olga (2002) works in STEM; graduated from [employer] in spring 2023.  Works in [employer] at [email or web address].       Social Determinants of Health     Financial Resource Strain: Low Risk  (10/2/2023)    Financial Resource Strain     Within the past 12 months, have you or your family members you live with been unable to get utilities (heat, electricity) when it was really needed?: No   Food Insecurity: Low Risk  (10/2/2023)    Food Insecurity     Within the past 12 months, did you worry that your food would run out before you got money to buy more?: No     Within the past 12 months, did the food you bought just not last and you didn t have money to get more?: No   Transportation Needs: Low Risk  (10/2/2023)    Transportation Needs     Within the past 12 months, has lack of transportation kept you from medical appointments, getting your medicines, non-medical meetings or appointments, work, or from getting things that you need?: No   Physical Activity: Not on file   Stress: Not on file   Social Connections: Not on file   Interpersonal Safety: Low Risk  (3/25/2024)    Interpersonal Safety     Do you feel physically and emotionally safe where you currently live?: Yes     Within the past 12 months, have you been hit, slapped, kicked or otherwise physically hurt by someone?: No     Within the past 12 months, have you been humiliated or emotionally abused in other ways by your partner or ex-partner?: No   Housing Stability: Low Risk  (10/2/2023)    Housing Stability     Do you have housing? : Yes     Are you worried about losing your housing?: No       Family History     Family History    Problem Relation Age of Onset    Kidney Disease Mother         s/p transplant     Myasthenia gravis Mother     Lung Cancer Mother     Hypothyroidism Sister     Bipolar Disorder Sister     Migraines Sister     Breast Cancer Sister     Hearing Loss Daughter         bilateral    Other - See Comments Daughter         asd spectrum    Albinism Daughter     Anxiety Disorder Daughter     Anxiety Disorder Daughter     Breast Cancer Maternal Aunt         Cancer-breast       ROS     12 ROS completed, pertinent positive and negative in HPI    Physical Exam   LMP 11/01/2020 (Approximate)    GENERAL: Healthy, alert and no distress  EYES: Eyes grossly normal to inspection.  No discharge or erythema, or obvious scleral/conjunctival abnormalities.  RESP: No audible wheeze, cough, or visible cyanosis.  No visible retractions or increased work of breathing.    SKIN: Visible skin clear. No significant rash, abnormal pigmentation or lesions.  NEURO: Cranial nerves grossly intact.  Mentation and speech appropriate for age.  PSYCH: Mentation appears normal, affect normal/bright, judgement and insight intact, normal speech and appearance well-groomed.     Labs/Imaging     Pertinent Labs were reviewed and updated in EPIC and discussed briefly.  Radiology Results were  reviewed and updated in EPIC and discussed briefly.    Summary of recent findings:   Lab Results   Component Value Date    A1C 6.5 11/08/2022    A1C 6.4 08/02/2022    A1C 7.0 12/08/2021    A1C 7.1 06/25/2021    A1C 7.0 01/07/2021    A1C 6.9 07/02/2020    A1C 7.2 05/07/2020    A1C 7.8 04/10/2019       TSH   Date Value Ref Range Status   04/15/2024 0.36 0.30 - 4.20 uIU/mL Final   10/20/2023 0.64 0.30 - 4.20 uIU/mL Final   03/07/2023 1.21 0.30 - 4.20 uIU/mL Final   11/08/2022 5.99 (H) 0.30 - 4.20 uIU/mL Final   08/02/2022 11.09 (H) 0.40 - 4.00 mU/L Final     T4 Total   Date Value Ref Range Status   09/02/2016 7.96 6.09 - 12.23 ug/dL    11/18/2015 8.08 6.09 - 12.23 ug/dL   "  06/03/2015 7.32 6.09 - 12.23 ug/dL      T4 Free   Date Value Ref Range Status   05/07/2020 0.77 0.60 - 1.60 ng/dL Final   04/10/2019 0.81 0.60 - 1.60 ng/dL Final   03/02/2018 0.96 0.60 - 1.60 ng/dL Final   04/22/2015 1.70 (H) 0.58 - 1.64 ng/dL      Free T4   Date Value Ref Range Status   11/08/2022 1.23 0.90 - 1.70 ng/dL Final   08/02/2022 1.00 0.60 - 1.60 ng/dL Final       Creatinine   Date Value Ref Range Status   03/30/2023 0.79 0.51 - 0.95 mg/dL Final   06/25/2021 0.92 0.60 - 1.20 mg/dL Final       Recent Labs   Lab Test 08/02/22  0915 06/25/21  1135   CHOL 267* 275*   HDL 78 90   * 174*   TRIG 60 54       No results found for: \"[id number]\", \"[id number]\", \"[id number]\"    I personally reviewed the patient's outside records from [facility] EMR and Care Everywhere. Summary of pertinent findings in HPI.    Impression / Plan     1. Diabetes Mellitus: Type 1  good control. Very happy with her glycemic control since using exercise mode. Today she reported concern about pump failure and what is the back up plan. We discussed lantus/novolog MDI in case of pump failure. I prescribed lantus 17 unit(s) as back up plan. She has novolog and synringe. We reviewed her ICR in case of pump failure.  I also prescribed glucagon for her.     2. Diabetes Complications: none,     3. Blood Pressure Management: Blood pressure is controlled. Currently is not on pharmacotherapy for this.     4.Lipid Management: Per the new ACC/CLINT/NHLBI guidelines, statins are recommended for individuals with diabetes aged 40-75 with LDL  without ASCVD, and for any individual with ASCVD. Started on low dose lipitor last vist. , lipitor tolerated , she is ok with increasing the dose to 20 mg      5. Smoking Status: Patient Pt is smoke free..     6. Hypothyroidism: on lt4 125 mcg daily. Last sth wnl on 4/2024.      Review of the result(s) of each unique test - A1c and diabetes related labs.            Test and/or medications prescribed " today:  No orders of the defined types were placed in this encounter.        Follow up: 3 month with PA and 6 month with me.    The longitudinal plan of care for the diagnosis(es)/condition(s) as documented were addressed during this visit. Due to the added complexity in care, I will continue to support Floresita in the subsequent management and with ongoing continuity of care.        Hermelindo Wesley MD  Endocrinology, Diabetes and Metabolism  [facility]

## 2024-04-24 ENCOUNTER — THERAPY VISIT (OUTPATIENT)
Dept: PHYSICAL THERAPY | Facility: OTHER | Age: 54
End: 2024-04-24
Attending: FAMILY MEDICINE
Payer: COMMERCIAL

## 2024-04-24 DIAGNOSIS — M76.72 PERONEAL TENDINITIS OF LEFT LOWER LEG: ICD-10-CM

## 2024-04-24 DIAGNOSIS — M48.062 SPINAL STENOSIS, LUMBAR REGION WITH NEUROGENIC CLAUDICATION: Primary | ICD-10-CM

## 2024-04-24 PROCEDURE — 97112 NEUROMUSCULAR REEDUCATION: CPT | Mod: GP

## 2024-04-24 PROCEDURE — 97162 PT EVAL MOD COMPLEX 30 MIN: CPT | Mod: GP

## 2024-04-24 NOTE — PROGRESS NOTES
PHYSICAL THERAPY EVALUATION  Type of Visit: Evaluation    See electronic medical record for Abuse and Falls Screening details.    Subjective       Presenting condition or subjective complaint: Leg pain due to back issue Originally had pain on the left calf and got new shoes from [employer] (used to heels) and was surprised that wearing them actually helped that pain. Now having issues with the right low back mostly in the mornings, once she's up walking it goes away. Normally has some issues with numbness on the left leg when sitting. Right LBP wraps around the hip to the thigh. This mostly hurts at night. Takes Ibu in the AM to help, sometimes before bed. Bed mobility has gotten worse again, this does bother normally. Feels balance is off. Dr. Robles thought her back was part of the problem as well.   Date of onset: 24 (approximate)  2 weeks  Relevant medical history:   L foot numbness 2-3 toes intermittently  Dates & types of surgery: Lumbar - 2021 L4-5 laminectomy and now has a cyst in that area they want to fuse    Prior diagnostic imaging/testing results: MRI; X-ray     Prior therapy history for the same diagnosis, illness or injury: Yes PT    Prior Level of Function  Transfers: Independent    Living Environment  Social support: With a significant other or spouse   Type of home: House   Stairs to enter the home: No       Ramp: No   Stairs inside the home: Yes 15 Is there a railing: Yes   Help at home: None  Equipment owned:       Employment: Yes [employer]  Hobbies/Interests: Walking, knitting, reading    Patient goals for therapy: Sleep comfortably. Walk with reduced pain.    Pain assessment: Location: left lower leg/Ratin/10     Objective   LUMBAR SPINE EVALUATION  PAIN: Pain Level at Rest: 0/10  Pain Level with Use: 7/10  Pain Location: lumbar spine  Pain Quality: Aching and Stabbing  Pain Frequency: intermittent  Pain is Worst: nighttime  Pain is Exacerbated By: sleeping, bed mobility  Pain  is Relieved By: otc medications and walking  INTEGUMENTARY (edema, incisions):   POSTURE:   GAIT: not formally assessed due to time constraints  BALANCE/PROPRIOCEPTION: WFL  WEIGHTBEARING ALIGNMENT: not formally assessed due to time constraints  NON-WEIGHTBEARING ALIGNMENT: WNL      ROM: Limited extension to approx 10 degrees due to pain  PELVIC/SI SCREEN: Standing Forward Bend: Jose THOMAS (March): neg  STRENGTH:  difficulty activating gluteals w/ clam strength test  MYOTOMES:   DTR S:   CORD SIGNS:   DERMATOMES:   NEURAL TENSION:  L>R sciatic dural tension  FLEXIBILITY:   LUMBAR/HIP Special Tests:  NORBERTO + B,     PELVIS/SI SPECIAL TESTS:  crossover + B, neg SLR  FUNCTIONAL TESTS:   PALPATION:  hypertonicity L QL  SPINAL SEGMENTAL CONCLUSIONS:    Left Right   T10     T11     T12     L1  FRS R   L2     L3     L4  FRS R   L5 FRS L ERS R   S1           Assessment & Plan   CLINICAL IMPRESSIONS  Medical Diagnosis: Peroneal tendinitis of left lower leg (M76.72)    Treatment Diagnosis: impaired lumbosacral mobility causing altered gait pattern, muscle weakness   Impression/Assessment: Patient is a 53 year old female with low back and radicular pain, lower leg pain complaints.  The following significant findings have been identified: Pain, Decreased ROM/flexibility, Decreased joint mobility, Decreased strength, Impaired balance, Impaired sensation, Impaired gait, Impaired muscle performance, and Decreased activity tolerance. These impairments interfere with their ability to perform work tasks, recreational activities, household chores, driving , household mobility, and community mobility as compared to previous level of function.     Clinical Decision Making (Complexity):  Clinical Presentation: Evolving/Changing  Clinical Presentation Rationale: based on medical and personal factors listed in PT evaluation  Clinical Decision Making (Complexity): Moderate complexity    PLAN OF CARE  Treatment Interventions:  Modalities:  Cryotherapy, E-stim, Hot Pack, Mechanical Traction, Ultrasound, Vasoneumatic Device  Interventions: Gait Training, Manual Therapy, Neuromuscular Re-education, Therapeutic Activity, Therapeutic Exercise, Self-Care/Home Management, Aquatic Therapy    Long Term Goals     PT Goal 1  Goal Identifier: mobility  Goal Description: Pt to maintain symmetrical pelvic alignment and alignment of L5S1 facets to allow her to return to walking for fitness  Target Date: 06/05/24  PT Goal 2  Goal Identifier: strength  Goal Description: Pt will have improved gluteal activation w/ 5/5 MMT of gluteals for ability to perform bed mobility with pain less than 3/10.  Target Date: 06/19/24  PT Goal 3  Goal Identifier: HEP  Goal Description: Pt will report consistent performance of home exercises of at least 5/7 days per week for self management of symptoms.  Target Date: 06/19/24      Frequency of Treatment: 1-2 times per week  Duration of Treatment: up to 3 months    Recommended Referrals to Other Professionals:   Education Assessment:        Risks and benefits of evaluation/treatment have been explained.   Patient/Family/caregiver agrees with Plan of Care.     Evaluation Time:     PT Eval, Moderate Complexity Minutes (09495): 30       Signing Clinician: Radha Ordonez PT

## 2024-04-26 ENCOUNTER — THERAPY VISIT (OUTPATIENT)
Dept: PHYSICAL THERAPY | Facility: OTHER | Age: 54
End: 2024-04-26
Attending: FAMILY MEDICINE
Payer: COMMERCIAL

## 2024-04-26 DIAGNOSIS — M48.062 SPINAL STENOSIS, LUMBAR REGION WITH NEUROGENIC CLAUDICATION: ICD-10-CM

## 2024-04-26 DIAGNOSIS — M76.72 PERONEAL TENDINITIS OF LEFT LOWER LEG: Primary | ICD-10-CM

## 2024-04-26 PROCEDURE — 97110 THERAPEUTIC EXERCISES: CPT | Mod: GP

## 2024-04-26 PROCEDURE — 97112 NEUROMUSCULAR REEDUCATION: CPT | Mod: GP

## 2024-05-01 ENCOUNTER — THERAPY VISIT (OUTPATIENT)
Dept: PHYSICAL THERAPY | Facility: OTHER | Age: 54
End: 2024-05-01
Attending: FAMILY MEDICINE
Payer: COMMERCIAL

## 2024-05-01 ENCOUNTER — TELEPHONE (OUTPATIENT)
Dept: ENDOCRINOLOGY | Facility: CLINIC | Age: 54
End: 2024-05-01
Payer: COMMERCIAL

## 2024-05-01 DIAGNOSIS — M48.062 SPINAL STENOSIS, LUMBAR REGION WITH NEUROGENIC CLAUDICATION: ICD-10-CM

## 2024-05-01 DIAGNOSIS — M76.72 PERONEAL TENDINITIS OF LEFT LOWER LEG: Primary | ICD-10-CM

## 2024-05-01 PROCEDURE — 97112 NEUROMUSCULAR REEDUCATION: CPT | Mod: GP

## 2024-05-01 PROCEDURE — 97110 THERAPEUTIC EXERCISES: CPT | Mod: GP

## 2024-05-01 PROCEDURE — 97140 MANUAL THERAPY 1/> REGIONS: CPT | Mod: GP

## 2024-05-01 NOTE — TELEPHONE ENCOUNTER
Left Voicemail (1st Attempt) and Sent Mychart (1st Attempt) for the patient to call back and schedule the following:    Appointment type: Return Diabetes  Provider: Dr. Wesley  Return date:   -3 mo with PA (around 7/22/24)  -6 mo with Dr. Wesley (around 10/22/24)  Specialty phone number: [phone]  Additional appointment(s) needed: NA  Additonal Notes: Dr. Wesley is transitioning to [place]- can schedule 6 mo follow-up there. Virtual or in person okay- patient preference

## 2024-05-03 ENCOUNTER — THERAPY VISIT (OUTPATIENT)
Dept: PHYSICAL THERAPY | Facility: OTHER | Age: 54
End: 2024-05-03
Attending: FAMILY MEDICINE
Payer: COMMERCIAL

## 2024-05-03 DIAGNOSIS — M76.72 PERONEAL TENDINITIS OF LEFT LOWER LEG: ICD-10-CM

## 2024-05-03 DIAGNOSIS — M48.062 SPINAL STENOSIS, LUMBAR REGION WITH NEUROGENIC CLAUDICATION: Primary | ICD-10-CM

## 2024-05-03 PROCEDURE — 97140 MANUAL THERAPY 1/> REGIONS: CPT | Mod: GP

## 2024-05-03 PROCEDURE — 97112 NEUROMUSCULAR REEDUCATION: CPT | Mod: GP

## 2024-05-03 PROCEDURE — 97110 THERAPEUTIC EXERCISES: CPT | Mod: GP

## 2024-05-03 NOTE — TELEPHONE ENCOUNTER
Patient confirmed scheduled appointment:  Date: 8/28/24 and 11/4/24  Time: 7:30 am and 12 pm  Visit type: return diabetes  Provider: Mary Maria// Dr. Wesley  Location: virtual  Testing/imaging: NA  Additional notes: patient said they would be out of state on trip end of August and would be back on 8/26/24. Scheduled next available after that- added to waitlist.

## 2024-05-08 ENCOUNTER — THERAPY VISIT (OUTPATIENT)
Dept: PHYSICAL THERAPY | Facility: OTHER | Age: 54
End: 2024-05-08
Attending: FAMILY MEDICINE
Payer: COMMERCIAL

## 2024-05-08 DIAGNOSIS — M76.72 PERONEAL TENDINITIS OF LEFT LOWER LEG: ICD-10-CM

## 2024-05-08 DIAGNOSIS — M48.062 SPINAL STENOSIS, LUMBAR REGION WITH NEUROGENIC CLAUDICATION: Primary | ICD-10-CM

## 2024-05-08 PROCEDURE — 97112 NEUROMUSCULAR REEDUCATION: CPT | Mod: GP

## 2024-05-08 PROCEDURE — 97110 THERAPEUTIC EXERCISES: CPT | Mod: GP

## 2024-05-09 ENCOUNTER — HOSPITAL ENCOUNTER (OUTPATIENT)
Dept: MAMMOGRAPHY | Facility: OTHER | Age: 54
Discharge: HOME | End: 2024-05-09
Attending: STUDENT IN AN ORGANIZED HEALTH CARE EDUCATION/TRAINING PROGRAM | Admitting: STUDENT IN AN ORGANIZED HEALTH CARE EDUCATION/TRAINING PROGRAM
Payer: COMMERCIAL

## 2024-05-09 DIAGNOSIS — Z12.31 VISIT FOR SCREENING MAMMOGRAM: ICD-10-CM

## 2024-05-09 PROCEDURE — 77063 BREAST TOMOSYNTHESIS BI: CPT

## 2024-05-10 ENCOUNTER — THERAPY VISIT (OUTPATIENT)
Dept: PHYSICAL THERAPY | Facility: OTHER | Age: 54
End: 2024-05-10
Attending: FAMILY MEDICINE
Payer: COMMERCIAL

## 2024-05-10 DIAGNOSIS — M48.062 SPINAL STENOSIS, LUMBAR REGION WITH NEUROGENIC CLAUDICATION: Primary | ICD-10-CM

## 2024-05-10 DIAGNOSIS — M76.72 PERONEAL TENDINITIS OF LEFT LOWER LEG: ICD-10-CM

## 2024-05-10 PROCEDURE — 97110 THERAPEUTIC EXERCISES: CPT | Mod: GP

## 2024-05-10 PROCEDURE — 97140 MANUAL THERAPY 1/> REGIONS: CPT | Mod: GP

## 2024-05-10 PROCEDURE — 97112 NEUROMUSCULAR REEDUCATION: CPT | Mod: GP

## 2024-05-15 ENCOUNTER — THERAPY VISIT (OUTPATIENT)
Dept: PHYSICAL THERAPY | Facility: OTHER | Age: 54
End: 2024-05-15
Attending: FAMILY MEDICINE
Payer: COMMERCIAL

## 2024-05-15 DIAGNOSIS — M48.062 SPINAL STENOSIS, LUMBAR REGION WITH NEUROGENIC CLAUDICATION: Primary | ICD-10-CM

## 2024-05-15 DIAGNOSIS — M76.72 PERONEAL TENDINITIS OF LEFT LOWER LEG: ICD-10-CM

## 2024-05-15 PROCEDURE — 97140 MANUAL THERAPY 1/> REGIONS: CPT | Mod: GP

## 2024-05-15 PROCEDURE — 97112 NEUROMUSCULAR REEDUCATION: CPT | Mod: GP

## 2024-05-15 PROCEDURE — 97110 THERAPEUTIC EXERCISES: CPT | Mod: GP

## 2024-05-21 DIAGNOSIS — E06.3 HYPOTHYROIDISM DUE TO HASHIMOTO'S THYROIDITIS: ICD-10-CM

## 2024-05-29 ENCOUNTER — THERAPY VISIT (OUTPATIENT)
Dept: PHYSICAL THERAPY | Facility: OTHER | Age: 54
End: 2024-05-29
Attending: FAMILY MEDICINE
Payer: COMMERCIAL

## 2024-05-29 DIAGNOSIS — M48.062 SPINAL STENOSIS, LUMBAR REGION WITH NEUROGENIC CLAUDICATION: Primary | ICD-10-CM

## 2024-05-29 DIAGNOSIS — M76.72 PERONEAL TENDINITIS OF LEFT LOWER LEG: ICD-10-CM

## 2024-05-29 PROCEDURE — 97112 NEUROMUSCULAR REEDUCATION: CPT | Mod: GP

## 2024-05-29 PROCEDURE — 97140 MANUAL THERAPY 1/> REGIONS: CPT | Mod: GP

## 2024-05-29 PROCEDURE — 97110 THERAPEUTIC EXERCISES: CPT | Mod: GP

## 2024-05-29 RX ORDER — LEVOTHYROXINE SODIUM 125 UG/1
125 TABLET ORAL
Qty: 90 TABLET | Refills: 1 | Status: SHIPPED | OUTPATIENT
Start: 2024-05-29

## 2024-05-29 NOTE — TELEPHONE ENCOUNTER
[facility] sent Rx request for the following:      Requested Prescriptions   Pending Prescriptions Disp Refills    levothyroxine (SYNTHROID/LEVOTHROID) 125 MCG tablet [Pharmacy Med Name: levothyroxine 125 mcg tablet] 90 tablet 1     Sig: Take 1 tablet (125 mcg) by mouth every morning (before breakfast) Increase in dose       Thyroid Protocol Passed - 5/29/2024 11:20 AM          Last Prescription Date:   10/2/23  Last Fill Qty/Refills:         90, R-1    Last Office Visit:              8/2/22   Future Office visit:            none    Will route to unit schedulers, patient is due for an annual wellness exam.    Routing refill request to provider for review/approval     Ju Barragan RN on 5/29/2024 at 11:25 AM

## 2024-06-05 ENCOUNTER — THERAPY VISIT (OUTPATIENT)
Dept: PHYSICAL THERAPY | Facility: OTHER | Age: 54
End: 2024-06-05
Attending: FAMILY MEDICINE
Payer: COMMERCIAL

## 2024-06-05 DIAGNOSIS — M76.72 PERONEAL TENDINITIS OF LEFT LOWER LEG: ICD-10-CM

## 2024-06-05 DIAGNOSIS — M48.062 SPINAL STENOSIS, LUMBAR REGION WITH NEUROGENIC CLAUDICATION: Primary | ICD-10-CM

## 2024-06-05 PROCEDURE — 97110 THERAPEUTIC EXERCISES: CPT | Mod: GP

## 2024-06-05 PROCEDURE — 97112 NEUROMUSCULAR REEDUCATION: CPT | Mod: GP

## 2024-06-12 ENCOUNTER — THERAPY VISIT (OUTPATIENT)
Dept: PHYSICAL THERAPY | Facility: OTHER | Age: 54
End: 2024-06-12
Attending: FAMILY MEDICINE
Payer: COMMERCIAL

## 2024-06-12 DIAGNOSIS — M76.72 PERONEAL TENDINITIS OF LEFT LOWER LEG: ICD-10-CM

## 2024-06-12 DIAGNOSIS — M48.062 SPINAL STENOSIS, LUMBAR REGION WITH NEUROGENIC CLAUDICATION: Primary | ICD-10-CM

## 2024-06-12 PROCEDURE — 97110 THERAPEUTIC EXERCISES: CPT | Mod: GP

## 2024-06-12 NOTE — PROGRESS NOTES
Progress: Pt has improved lumbosacral mobility with decreased right sided radicular pain and has improved significantly her core and gluteal strength to allow walking, bed mobility and most daily tasks without increased right sided pain.     PLAN  Continue therapy per current plan of care.  Trial decreasing frequency over the next month to evaluate effectiveness of self management with home exercises.     Beginning/End Dates of Progress Note Reporting Period:  04/24/24 to 06/12/2024    Referring Provider:  Paul Robles    06/12/24 0500   Appointment Info   Signing clinician's name / credentials Radha Ordonez DPT   Visits Used 10   Medical Diagnosis Peroneal tendinitis of left lower leg (M76.72)   PT Tx Diagnosis impaired lumbosacral mobility causing altered gait pattern, muscle weakness   Other pertinent information L45 laminectomy, T1 diabetic on insulin pump   Progress Note/Certification   Onset of illness/injury or Date of Surgery 04/07/24  (approximate)   Therapy Frequency 1-2 times per week   Predicted Duration up to 3 months   Progress Note Due Date 07/24/24   Progress Note Completed Date 04/24/24   GOALS   PT Goals 2;3   PT Goal 1   Goal Identifier mobility   Goal Description Pt to maintain symmetrical pelvic alignment and alignment of L5S1 facets to allow her to return to walking for fitness   Target Date 06/05/24   Date Met 06/12/24   PT Goal 2   Goal Identifier strength   Goal Description Pt will have improved gluteal activation w/ 5/5 MMT of gluteals for ability to perform bed mobility with pain less than 3/10.   Goal Progress 80% achieved   Target Date 06/19/24   PT Goal 3   Goal Identifier HEP   Goal Description Pt will report consistent performance of home exercises of at least 5/7 days per week for self management of symptoms.   Goal Progress Progressing. Will continue to modify and progress strengthening as able.   Target Date 06/19/24   Subjective Report   Subjective Report Right side is 80-90%  "better. It is unusual to have problems with it now. The \"shave the legs\" stretch helps if she does have issues. All day Saturday gardened and Sunday [place] way [place] cities so things are a bit riled up on the left side.   Objective Measures   Objective Measures Objective Measure 1;Objective Measure 2   Objective Measure 1   Objective Measure pelvis   Details even   Objective Measure 2   Objective Measure segmental mobility   Details good   Treatment Interventions (PT)   Interventions Therapeutic Procedure/Exercise;Neuromuscular Re-education;Manual Therapy   Therapeutic Procedure/Exercise   Therapeutic Procedures: strength, endurance, ROM, flexibility minutes (47596) 35   Therapeutic Procedures Ther Proc 2;Ther Proc 3   Ther Proc 1 exercises   Ther Proc 1 - Details DEFERRED: prone prop gluteal eccentric exercise w/ manual and verbal cues to engage glutes correctly and avoid QL excessive firing. quadruped QL/gluteal stretching B- felt good, targets right area; quadruped TA bracing w/ alt leg lifts- caused some LLE radiating sxs so modified to prone gluteal ex as above; seated pelvic clocks 12-6- improved ability to complete this correctly compared to supine. prone gluteal isometric heel squeeze x10; seated sciatic nn gliding, supine TA bracing w/ marching- decreased to partial marches w/ toe lift and heel lift while maintaining TA tightening and added to HEP; gluteal isometrics against wall added to HEP; discussed stretching before bed and potentially in the morning. seated lateral cutaneous/sural nerve glide in sitting and demonstrated in supine w/ belt- try in place of dural glides already doing at home; trial of standing hip flexor stretch w/ foot on stool but this caused more pain than stretching; supine hip flexor stretch trialed with more stretching but some pain as well so discontinued. Discussed use of heat and massaging the hip flexor.   Ther Proc 2 stretching   Ther Proc 2 - Details supine AP hip " mobs w/ piriformis stretching- L side did not cause radicular pain today; prone PA hip mobs w/ hip flexor stretching BLE; supine HS and calf stretching BLE   Skilled Intervention cues for form   Patient Response/Progress able to progress core strengthening with definite fatigue.   Ther Proc 3 soft tissue mobilization   Ther Proc 3 - Details prone STM through B QL, lumbar paraspinals, gluteals   Neuromuscular Re-education   Neuromuscular Re-education Neuro Re-ed 2   Neuro Re-ed 1 muscle energy techniques (MET)   Neuro Re-ed 2 core strength   Skilled Intervention reevaluation of response   Patient Response/Progress improving gluteal and TA firing; improved mobility, some increased radicular signs on LLE but brief   Manual Therapy   Manual Therapy 1 manual/ STM   Manual Therapy 1 - Details STM through R lumbar paraspinals and B gluteals; hip flexor release in supine B, R femoral nerve release   Skilled Intervention assessment of response and tissue quality   Patient Response/Progress feels good, tender   Plan   Home program Access Code: [email or web address]: supine sciatic nn gliding supine TA bracing (progress to bridging and/or marching), sidely clams, lumbar FRS correction; gluteal isometrics against wall; quadruped gluteal stretch vs seated gluteal stretch; sidelying hip flexor stretch   Plan for next session manual therapy, core and gluteal strengthening, evaluation of walking pattern and LLE pain further as needed.   Comments   Comments Improved right sided pain with improving core and gluteal strength.   Total Session Time   Timed Code Treatment Minutes 35   Total Treatment Time (sum of timed and untimed services) 35

## 2024-06-19 ENCOUNTER — THERAPY VISIT (OUTPATIENT)
Dept: PHYSICAL THERAPY | Facility: OTHER | Age: 54
End: 2024-06-19
Attending: FAMILY MEDICINE
Payer: COMMERCIAL

## 2024-06-19 DIAGNOSIS — M48.062 SPINAL STENOSIS, LUMBAR REGION WITH NEUROGENIC CLAUDICATION: Primary | ICD-10-CM

## 2024-06-19 DIAGNOSIS — M76.72 PERONEAL TENDINITIS OF LEFT LOWER LEG: ICD-10-CM

## 2024-06-19 PROCEDURE — 97110 THERAPEUTIC EXERCISES: CPT | Mod: GP

## 2024-06-21 ENCOUNTER — MYC MEDICAL ADVICE (OUTPATIENT)
Dept: ENDOCRINOLOGY | Facility: CLINIC | Age: 54
End: 2024-06-21
Payer: COMMERCIAL

## 2024-06-22 DIAGNOSIS — E10.9 TYPE 1 DIABETES MELLITUS WITHOUT COMPLICATION (H): ICD-10-CM

## 2024-06-26 ENCOUNTER — THERAPY VISIT (OUTPATIENT)
Dept: PHYSICAL THERAPY | Facility: OTHER | Age: 54
End: 2024-06-26
Attending: FAMILY MEDICINE
Payer: COMMERCIAL

## 2024-06-26 DIAGNOSIS — M76.72 PERONEAL TENDINITIS OF LEFT LOWER LEG: ICD-10-CM

## 2024-06-26 DIAGNOSIS — M48.062 SPINAL STENOSIS, LUMBAR REGION WITH NEUROGENIC CLAUDICATION: Primary | ICD-10-CM

## 2024-06-26 PROCEDURE — 97110 THERAPEUTIC EXERCISES: CPT | Mod: GP

## 2024-06-26 PROCEDURE — 97112 NEUROMUSCULAR REEDUCATION: CPT | Mod: GP

## 2024-06-26 RX ORDER — PROCHLORPERAZINE 25 MG/1
SUPPOSITORY RECTAL
Qty: 3 EACH | Refills: 3 | Status: SHIPPED | OUTPATIENT
Start: 2024-06-26

## 2024-06-26 NOTE — TELEPHONE ENCOUNTER
[facility] sent Rx request for the following:      Requested Prescriptions   Pending Prescriptions Disp Refills    Continuous Glucose Sensor (DEXCOM G6 SENSOR) MISC [Pharmacy Med Name: Dexcom G6 Sensor device]  11     Sig: Change every 10 days.       There is no refill protocol information for this order        Last Prescription Date:   6/29/23  Last Fill Qty/Refills:         3, R-11    Last Office Visit:              10/4/23 (DM discussed)   Future Office visit:           None    Unable to complete prescription refill per RN Medication Refill Policy.     Routing to covering provider for refill consideration, as PCP/provider is out of clinic >48 hours or Pt is completely out of medication and provider is out of the clinic today.    Francheska Veliz RN .............. 6/26/2024  1:28 PM

## 2024-06-28 ENCOUNTER — THERAPY VISIT (OUTPATIENT)
Dept: PHYSICAL THERAPY | Facility: OTHER | Age: 54
End: 2024-06-28
Attending: FAMILY MEDICINE
Payer: COMMERCIAL

## 2024-06-28 DIAGNOSIS — M48.062 SPINAL STENOSIS, LUMBAR REGION WITH NEUROGENIC CLAUDICATION: Primary | ICD-10-CM

## 2024-06-28 DIAGNOSIS — M76.72 PERONEAL TENDINITIS OF LEFT LOWER LEG: ICD-10-CM

## 2024-06-28 PROCEDURE — 97112 NEUROMUSCULAR REEDUCATION: CPT | Mod: GP

## 2024-06-28 PROCEDURE — 97110 THERAPEUTIC EXERCISES: CPT | Mod: GP

## 2024-06-28 PROCEDURE — 97140 MANUAL THERAPY 1/> REGIONS: CPT | Mod: GP

## 2024-07-09 ENCOUNTER — THERAPY VISIT (OUTPATIENT)
Dept: PHYSICAL THERAPY | Facility: OTHER | Age: 54
End: 2024-07-09
Attending: FAMILY MEDICINE
Payer: COMMERCIAL

## 2024-07-09 DIAGNOSIS — M48.062 SPINAL STENOSIS, LUMBAR REGION WITH NEUROGENIC CLAUDICATION: Primary | ICD-10-CM

## 2024-07-09 DIAGNOSIS — M76.72 PERONEAL TENDINITIS OF LEFT LOWER LEG: ICD-10-CM

## 2024-07-09 PROCEDURE — 97110 THERAPEUTIC EXERCISES: CPT | Mod: GP

## 2024-07-09 PROCEDURE — 97112 NEUROMUSCULAR REEDUCATION: CPT | Mod: GP

## 2024-07-15 ENCOUNTER — TRANSFERRED RECORDS (OUTPATIENT)
Dept: HEALTH INFORMATION MANAGEMENT | Facility: OTHER | Age: 54
End: 2024-07-15
Payer: COMMERCIAL

## 2024-07-15 LAB — RETINOPATHY: NEGATIVE

## 2024-07-19 ENCOUNTER — THERAPY VISIT (OUTPATIENT)
Dept: PHYSICAL THERAPY | Facility: OTHER | Age: 54
End: 2024-07-19
Attending: FAMILY MEDICINE
Payer: COMMERCIAL

## 2024-07-19 DIAGNOSIS — M48.062 SPINAL STENOSIS, LUMBAR REGION WITH NEUROGENIC CLAUDICATION: Primary | ICD-10-CM

## 2024-07-19 DIAGNOSIS — M76.72 PERONEAL TENDINITIS OF LEFT LOWER LEG: ICD-10-CM

## 2024-07-19 PROCEDURE — 97140 MANUAL THERAPY 1/> REGIONS: CPT | Mod: GP

## 2024-07-19 PROCEDURE — 97112 NEUROMUSCULAR REEDUCATION: CPT | Mod: GP

## 2024-07-19 PROCEDURE — 97110 THERAPEUTIC EXERCISES: CPT | Mod: GP

## 2024-07-30 ENCOUNTER — THERAPY VISIT (OUTPATIENT)
Dept: PHYSICAL THERAPY | Facility: OTHER | Age: 54
End: 2024-07-30
Attending: FAMILY MEDICINE
Payer: COMMERCIAL

## 2024-07-30 DIAGNOSIS — M76.72 PERONEAL TENDINITIS OF LEFT LOWER LEG: ICD-10-CM

## 2024-07-30 DIAGNOSIS — M48.062 SPINAL STENOSIS, LUMBAR REGION WITH NEUROGENIC CLAUDICATION: Primary | ICD-10-CM

## 2024-07-30 PROCEDURE — 97112 NEUROMUSCULAR REEDUCATION: CPT | Mod: GP

## 2024-07-30 PROCEDURE — 97140 MANUAL THERAPY 1/> REGIONS: CPT | Mod: GP

## 2024-08-05 ENCOUNTER — TELEPHONE (OUTPATIENT)
Dept: FAMILY MEDICINE | Facility: OTHER | Age: 54
End: 2024-08-05
Payer: COMMERCIAL

## 2024-08-05 DIAGNOSIS — E10.9 TYPE 1 DIABETES MELLITUS WITHOUT COMPLICATION (H): Primary | ICD-10-CM

## 2024-08-05 RX ORDER — INSULIN LISPRO 100 [IU]/ML
INJECTION, SOLUTION INTRAVENOUS; SUBCUTANEOUS
Qty: 40 ML | Refills: 4 | Status: SHIPPED | OUTPATIENT
Start: 2024-08-05

## 2024-08-05 NOTE — TELEPHONE ENCOUNTER
Novolog vials have increased in price for [employer] employee pricing. Rp wanted to confirm with patient that she can use Humalog vials per CPA switch without issue.    Patient confirmed that is okay to switch and that she only needs 1 vial right now. Script for 1-year supply will be re-written for Humalog vials per CPA.    Chreyl Alberto, PharmD  [facility]

## 2024-08-09 ENCOUNTER — THERAPY VISIT (OUTPATIENT)
Dept: PHYSICAL THERAPY | Facility: OTHER | Age: 54
End: 2024-08-09
Attending: FAMILY MEDICINE
Payer: COMMERCIAL

## 2024-08-09 DIAGNOSIS — M76.72 PERONEAL TENDINITIS OF LEFT LOWER LEG: ICD-10-CM

## 2024-08-09 DIAGNOSIS — M48.062 SPINAL STENOSIS, LUMBAR REGION WITH NEUROGENIC CLAUDICATION: Primary | ICD-10-CM

## 2024-08-09 PROCEDURE — 97112 NEUROMUSCULAR REEDUCATION: CPT | Mod: GP

## 2024-08-09 PROCEDURE — 97110 THERAPEUTIC EXERCISES: CPT | Mod: GP

## 2024-08-14 ENCOUNTER — THERAPY VISIT (OUTPATIENT)
Dept: PHYSICAL THERAPY | Facility: OTHER | Age: 54
End: 2024-08-14
Attending: FAMILY MEDICINE
Payer: COMMERCIAL

## 2024-08-14 DIAGNOSIS — M48.062 SPINAL STENOSIS, LUMBAR REGION WITH NEUROGENIC CLAUDICATION: Primary | ICD-10-CM

## 2024-08-14 DIAGNOSIS — M76.72 PERONEAL TENDINITIS OF LEFT LOWER LEG: ICD-10-CM

## 2024-08-14 PROCEDURE — 97112 NEUROMUSCULAR REEDUCATION: CPT | Mod: GP

## 2024-08-14 PROCEDURE — 97110 THERAPEUTIC EXERCISES: CPT | Mod: GP

## 2024-08-15 NOTE — PROGRESS NOTES
Virtual Visit Details    Type of service:  Video Visit     Originating Location (pt. Location):   Distant Location (provider location):    Platform used for Video Visit:     Outcome for 08/15/24 2:29 PM: Dattch message sent  Adriana Chong MA  Outcome for 08/26/24 1:06 PM: Left Voicemail   Adriana Chong MA  Outcome for 08/26/24 1:15 PM: Replied to Dattch message  Adriana Chong MA  Outcome for 08/26/24 2:00 PM: Data obtained via Tandem website  Adriana Chong MA

## 2024-08-26 ENCOUNTER — TELEPHONE (OUTPATIENT)
Dept: ENDOCRINOLOGY | Facility: CLINIC | Age: 54
End: 2024-08-26
Payer: COMMERCIAL

## 2024-08-26 NOTE — TELEPHONE ENCOUNTER
Called patient and left voicemail. Patient has an appointment on  8/28/24 . Need patient to upload their Tandem device to site for provider to review prior to their appointment.  Adriana Chong MA

## 2024-08-28 ENCOUNTER — THERAPY VISIT (OUTPATIENT)
Dept: PHYSICAL THERAPY | Facility: OTHER | Age: 54
End: 2024-08-28
Attending: FAMILY MEDICINE
Payer: COMMERCIAL

## 2024-08-28 ENCOUNTER — VIRTUAL VISIT (OUTPATIENT)
Dept: ENDOCRINOLOGY | Facility: CLINIC | Age: 54
End: 2024-08-28
Payer: COMMERCIAL

## 2024-08-28 DIAGNOSIS — M76.72 PERONEAL TENDINITIS OF LEFT LOWER LEG: ICD-10-CM

## 2024-08-28 DIAGNOSIS — E10.9 TYPE 1 DIABETES MELLITUS WITHOUT COMPLICATION (H): Primary | ICD-10-CM

## 2024-08-28 DIAGNOSIS — M48.062 SPINAL STENOSIS, LUMBAR REGION WITH NEUROGENIC CLAUDICATION: Primary | ICD-10-CM

## 2024-08-28 PROCEDURE — 97112 NEUROMUSCULAR REEDUCATION: CPT | Mod: GP

## 2024-08-28 PROCEDURE — 99214 OFFICE O/P EST MOD 30 MIN: CPT | Mod: 95 | Performed by: PHYSICIAN ASSISTANT

## 2024-08-28 PROCEDURE — 97110 THERAPEUTIC EXERCISES: CPT | Mod: GP

## 2024-08-28 RX ORDER — ACYCLOVIR 400 MG/1
TABLET ORAL
Qty: 3 EACH | Refills: 5 | Status: SHIPPED | OUTPATIENT
Start: 2024-08-28

## 2024-08-28 NOTE — LETTER
8/28/2024       RE: Floresita Hill  [address] [facility] [address]     Dear Colleague,    Thank you for referring your patient, Floresita Hill, to the [facility] [place] at [facility]. Please see a copy of my visit note below.    Virtual Visit Details    Type of service:  Video Visit     Originating Location (pt. Location):   Distant Location (provider location):    Platform used for Video Visit:     Outcome for 08/15/24 2:29 PM: Bfly message sent  Adriana Chong MA  Outcome for 08/26/24 1:06 PM: Left Voicemail   Adriana Chong MA  Outcome for 08/26/24 1:15 PM: Replied to Bfly message  Adriana Chong MA  Outcome for 08/26/24 2:00 PM: Data obtained via Tandem website  Adriana Chong MA                Time of start: 7:33 am   Time of end: 7:52 am   Total duration of video visit: 19 minutes.  Providers location: offsite.  Patients location: [place].    HPI  Floresita Hill is a 54 year old female with type 1 diabetes mellitus.  Video visit for diabetes follow up today.  Pt last seen by Dr. Wesley in April 2024.  Pt dx with type 1 DM at age 10.  No hx of DKA.  Denies hx of diabetic retinopathy, nephropathy or neuropathy.  Hx of hypothyroidism, Fe def anemia, dysthymic disorder and spinal stenosis.  For her diabetes, pt wears a Tandem insulin pump-control IQ and DexcomG6 sensor.  Basal insulin rates:  Midnight = 0.575 units/hr  CF 60  I/C ratio 1:10  6 am = 0.8 units/hr   CF 50    I/C ratio 1:10  10 am = 0.75 units/hr   CF 50   I/C ratio 1:10  10 pm = 0.675 units/hr  CF 50  I/C ratio 1:10.  Most recent A1C was 6.6 % in 4/2024.   I reviewed and scanned pt's Tandem insulin pump download data in her note below.  Frequent hypoglycemia between 4 pm - 5 pm. She often receives an auto correction midafternoon.  Pt uses exercise mode/exercise profile when she walks after lunch and  dinner which has been working.  Average glucose 147 with SD 55.  Estimated A1c 6.8%.  Glucose in target range 76% of the time with 2 % hypoglycemia.  On ROS today, reports doing well.  Eats healthy and continues to walk after lunch and dinner on a regular basis.  Denies blurred vision or symptoms of diabetic neuropathy.  No foot ulcers per patient.  Patient denies nausea, vomiting, shortness of breath at rest, cough or fever.  No chest pain, abdominal pain or diarrhea.  No blood in stool or melena.  Denies dysuria or hematuria.  She has not had her menses for several years.  BP being monitored by primary care provider.    Diabetes Care  Retinopathy: none. Last Oph exam July 2024.  Nephropathy:none. Urine microalbuminuria negative in Oct 2023.   Neuropathy: none.  Foot Exam: no exam today.  Taking aspirin: yes.  Lipids:  in 4/2024. Pt taking Lipitor.  Insulin: Tandem insulin pump-control IQ.  Testing: DexcomG6 sensor.  DexcomG7 sensor ordered today.  Hypoglycemia treatment: Has Baqsimi.  Backup basal insulin: Patient has backup basal insulin and refrigerator in case insulin pump fails.                ROS  Please see under HPI.    Allergies  Allergies   Allergen Reactions     Codeine Unknown     Loses consciousness     Statins Muscle Pain (Myalgia)     Mild achiness, may try again in future       Medications  Current Outpatient Medications   Medication Sig Dispense Refill     albuterol (PROAIR HFA/PROVENTIL HFA/VENTOLIN HFA) 108 (90 Base) MCG/ACT inhaler Inhale 2 puffs into the lungs every 4 hours as needed for cough or shortness of breath 18 g 0     aspirin EC 81 MG EC tablet Take 81 mg by mouth daily with food       atorvastatin (LIPITOR) 20 MG tablet Take 0.5 tablets (10 mg) by mouth daily 90 tablet 2     benzonatate (TESSALON) 100 MG capsule Take 1 capsule (100 mg) by mouth 3 times daily as needed for cough 42 capsule 0     blood glucose (CONTOUR NEXT TEST) test strip 100 strips by In Vitro route 4 times  "daily (before meals and nightly) Use to test blood sugar 4 times daily before meals and in the morning or as directed. 450 strip 3     Blood Glucose Monitoring Suppl ([employer] BLOOD GLUCOSE MONITOR) W/DEVICE KIT Dispense glucose meter, test strips and lancets covered by the patient insurance. Test 5 times per day. 3 month supply with 4 refills.       Continuous Blood Gluc Sensor (DEXCOM G6 SENSOR) MISC        Continuous Blood Gluc Transmit (DEXCOM G6 TRANSMITTER) MISC Change every 3 months. 1 each 3     Continuous Blood Gluc Transmit (DEXCOM G6 TRANSMITTER) MISC        Continuous Glucose Sensor (DEXCOM G6 SENSOR) MISC Change every 10 days. 3 each 3     FLUoxetine (PROZAC) 10 MG capsule Take 1 capsule by mouth daily with 20 mg capsule. Total daily dose = 30 mg. 90 capsule 4     FLUoxetine (PROZAC) 20 MG capsule Take 1 capsule by mouth daily with 10 mg capsule. Total daily dose = 30 mg. 90 capsule 4     Glucagon (BAQSIMI) 3 MG/DOSE nasal powder Spray 1 spray (3 mg) in nostril as needed for low blood sugar in the event of unconscious hypoglycemia or hypoglycemic seizure. May repeat dose if no response after 15 minutes. 1 each 1     insulin glargine (LANTUS PEN) 100 UNIT/ML pen Inject 20 Units Subcutaneous At Bedtime In case of pump failure 15 mL 3     insulin glargine (LANTUS SOLOSTAR) 100 UNIT/ML pen Take 17 unit(s) in case of pump failure 15 mL 1     insulin lispro (HUMALOG VIAL) 100 UNIT/ML vial Use as directed with insulin pump; max daily dose of 45 units 40 mL 4     insulin pen needle (B-D U/F) 31G X 5 MM miscellaneous Use 1 pen needle daily as directed. 100 each prn     insulin syringe-needle U-100 (29G X 1/2\" 0.5 ML) 29G X 1/2\" 0.5 ML miscellaneous For administering insulin at home.       levothyroxine (SYNTHROID/LEVOTHROID) 125 MCG tablet Take 1 tablet (125 mcg) by mouth every morning (before breakfast) Increase in dose 90 tablet 1     triamcinolone (KENALOG) 0.1 % external cream Apply topically 2 times daily " (Patient not taking: Reported on 2/7/2024) 80 g 3       Family History  family history includes Albinism in her daughter; Anxiety Disorder in her daughter and daughter; Bipolar Disorder in her sister; Breast Cancer in her maternal aunt and sister; Hearing Loss in her daughter; Hypothyroidism in her sister; Kidney Disease in her mother; Lung Cancer in her mother; Migraines in her sister; Myasthenia gravis in her mother; Other - See Comments in her daughter.    Social History   reports that she has never smoked. She has never been exposed to tobacco smoke. She has never used smokeless tobacco. She reports current alcohol use of about 2.0 standard drinks of alcohol per week. She reports that she does not use drugs.     Past Medical History  Past Medical History:   Diagnosis Date     DDD (degenerative disc disease), lumbar      Dysthymic disorder      Hashimoto's thyroiditis 03/29/2015     Iron deficiency anemia 11/16/2012     Type 1 diabetes mellitus without complications (H)     Dx at 10 years old,Uses Medtronic Insulin pump.       Past Surgical History:   Procedure Laterality Date     ARTHROTOMY TEMPOROMANDIBULAR JOINT  1985     BACK SURGERY  07/2020    hemilaminotomies     BREAST BIOPSY, RT/LT Left 04/12/2019    benign     COLONOSCOPY  05/24/2019    normal results, follow up 10 years     COLONOSCOPY N/A 05/24/2019    Procedure: COLONOSCOPY;  Surgeon: Maria Victoria Dubose MD;  Location: [facility] OR     DILATION AND CURETTAGE      x3; benign       Physical Exam    No exam today.      RESULTS  Creatinine   Date Value Ref Range Status   03/30/2023 0.79 0.51 - 0.95 mg/dL Final   06/25/2021 0.92 0.60 - 1.20 mg/dL Final     GFR Estimate   Date Value Ref Range Status   03/30/2023 88 >60 mL/min/1.73m2 Final     Comment:     eGFR calculated using 2021 CKD-EPI equation.   06/25/2021 64 >60 mL/min/[1.73_m2] Final     Hemoglobin A1C   Date Value Ref Range Status   04/15/2024 6.6 (H) 4.0 - 6.2 % Final   06/25/2021 7.1 (H) 4.0 - 6.0 % Final      Potassium   Date Value Ref Range Status   03/30/2023 4.1 3.4 - 5.3 mmol/L Final   08/02/2022 4.2 3.5 - 5.1 mmol/L Final   06/25/2021 3.9 3.5 - 5.1 mmol/L Final     ALT   Date Value Ref Range Status   03/30/2023 17 10 - 35 U/L Final   06/25/2021 15 7 - 52 U/L Final     AST   Date Value Ref Range Status   03/30/2023 19 10 - 35 U/L Final   06/25/2021 17 13 - 39 U/L Final     TSH   Date Value Ref Range Status   04/15/2024 0.36 0.30 - 4.20 uIU/mL Final   08/02/2022 11.09 (H) 0.40 - 4.00 mU/L Final     T4 Total   Date Value Ref Range Status   09/02/2016 7.96 6.09 - 12.23 ug/dL      T4 Free   Date Value Ref Range Status   05/07/2020 0.77 0.60 - 1.60 ng/dL Final     Free T4   Date Value Ref Range Status   11/08/2022 1.23 0.90 - 1.70 ng/dL Final       Cholesterol   Date Value Ref Range Status   04/15/2024 215 (H) <200 mg/dL Final   10/20/2023 280 (H) <200 mg/dL Final   06/25/2021 275 (H) <200 mg/dL Final   05/07/2020 255 (H) <200 mg/dL Final     HDL Cholesterol   Date Value Ref Range Status   06/25/2021 90 23 - 92 mg/dL Final   05/07/2020 84 23 - 92 mg/dL Final     Direct Measure HDL   Date Value Ref Range Status   04/15/2024 96 >=50 mg/dL Final   10/20/2023 105 >=50 mg/dL Final     LDL Cholesterol Calculated   Date Value Ref Range Status   04/15/2024 109 (H) <=100 mg/dL Final   10/20/2023 167 (H) <=100 mg/dL Final   06/25/2021 174 (H) <100 mg/dL Final     Comment:     Above desirable:  100-129 mg/dl  Borderline High:  130-159 mg/dL  High:             160-189 mg/dL  Very high:       >189 mg/dl     05/07/2020 156 (H) <100 mg/dL Final     Comment:     Above desirable:  100-129 mg/dl  Borderline High:  130-159 mg/dL  High:             160-189 mg/dL  Very high:       >189 mg/dl       Triglycerides   Date Value Ref Range Status   04/15/2024 50 <150 mg/dL Final   10/20/2023 41 <150 mg/dL Final   06/25/2021 54 <150 mg/dL Final   05/07/2020 76 <150 mg/dL Final         ASSESSMENT/PLAN:     TYPE 1 DIABETES MELLITUS:  Well-controlled type 1 diabetes mellitus with no microvascular complications.  Only insulin pump setting change today was correction factor changed to 60 ( was 50 ) at 10 AM.  Patient seen by ophthalmology in July 2024 with no diabetic retinopathy.  Her urine microalbuminuria has been negative with normal creatinine/GFR.  No symptoms of diabetic neuropathy or foot ulcers.  BP is being monitored by primary care provider per patient.    LIPIDS:  in April 2024.  Patient taking Lipitor.  HYPOTHYROIDISM: TSH normal and April 2024.  Continue current levothyroxine dose.  WEIGHT: Encouraged patient to continue to make healthy food choices and to remain active.  FOLLOW UP: With  in Nov 2024.   Dexcom G7 sensor ordered today.    Time spent reviewing chart, labs and insulin pump download data today= 5 minutes.  Time for video visit today= 19 minutes.  Time for documentation today= 15 minutes.    Total time for visit today= 39 minutes.    Mary Maria PA-C      Again, thank you for allowing me to participate in the care of your patient.      Sincerely,    Mary Maria PA-C

## 2024-08-28 NOTE — NURSING NOTE
Current patient location:  [facility]    Is the patient currently in the state of [place]? YES    Visit mode:VIDEO    If the visit is dropped, the patient can be reconnected by: VIDEO VISIT: Text to cell phone:   Telephone Information:   Mobile [phone]       Will anyone else be joining the visit? NO  (If patient encounters technical issues they should call [phone])    How would you like to obtain your AVS? MyChart    Are changes needed to the allergy or medication list? Pt stated no med changes    Are refills needed on medications prescribed by this physician? YES- pt would like to switch from Dexcom G6- to G7.     Rooming Documentation:  Questionnaire(s) completed      Reason for visit: RECHECK (3 mo follow-up )    Kayy COLLINS

## 2024-08-28 NOTE — PROGRESS NOTES
Time of start: 7:33 am   Time of end: 7:52 am   Total duration of video visit: 19 minutes.  Providers location: offsite.  Patients location: [place].    \[facility]\""  Floresita Hill is a 54 year old female with type 1 diabetes mellitus.  Video visit for diabetes follow up today.  Pt last seen by Dr. Wesley in April 2024.  Pt dx with type 1 DM at age 10.  No hx of DKA.  Denies hx of diabetic retinopathy, nephropathy or neuropathy.  Hx of hypothyroidism, Fe def anemia, dysthymic disorder and spinal stenosis.  For her diabetes, pt wears a Tandem insulin pump-control IQ and DexcomG6 sensor.  Basal insulin rates:  Midnight = 0.575 units/hr  CF 60  I/C ratio 1:10  6 am = 0.8 units/hr   CF 50    I/C ratio 1:10  10 am = 0.75 units/hr   CF 50   I/C ratio 1:10  10 pm = 0.675 units/hr  CF 50  I/C ratio 1:10.  Most recent A1C was 6.6 % in 4/2024.   I reviewed and scanned pt's Tandem insulin pump download data in her note below.  Frequent hypoglycemia between 4 pm - 5 pm. She often receives an auto correction midafternoon.  Pt uses exercise mode/exercise profile when she walks after lunch and dinner which has been working.  Average glucose 147 with SD 55.  Estimated A1c 6.8%.  Glucose in target range 76% of the time with 2 % hypoglycemia.  On ROS today, reports doing well.  Eats healthy and continues to walk after lunch and dinner on a regular basis.  Denies blurred vision or symptoms of diabetic neuropathy.  No foot ulcers per patient.  Patient denies nausea, vomiting, shortness of breath at rest, cough or fever.  No chest pain, abdominal pain or diarrhea.  No blood in stool or melena.  Denies dysuria or hematuria.  She has not had her menses for several years.  BP being monitored by primary care provider.    Diabetes Care  Retinopathy: none. Last Oph exam July 2024.  Nephropathy:none. Urine microalbuminuria negative in Oct 2023.   Neuropathy: none.  Foot Exam: no exam today.  Taking aspirin: yes.  Lipids:  in 4/2024.  Pt taking Lipitor.  Insulin: Tandem insulin pump-control IQ.  Testing: DexcomG6 sensor.  DexcomG7 sensor ordered today.  Hypoglycemia treatment: Has Baqsimi.  Backup basal insulin: Patient has backup basal insulin and refrigerator in case insulin pump fails.                ROS  Please see under HPI.    Allergies  Allergies   Allergen Reactions    Codeine Unknown     Loses consciousness    Statins Muscle Pain (Myalgia)     Mild achiness, may try again in future       Medications  Current Outpatient Medications   Medication Sig Dispense Refill    albuterol (PROAIR HFA/PROVENTIL HFA/VENTOLIN HFA) 108 (90 Base) MCG/ACT inhaler Inhale 2 puffs into the lungs every 4 hours as needed for cough or shortness of breath 18 g 0    aspirin EC 81 MG EC tablet Take 81 mg by mouth daily with food      atorvastatin (LIPITOR) 20 MG tablet Take 0.5 tablets (10 mg) by mouth daily 90 tablet 2    benzonatate (TESSALON) 100 MG capsule Take 1 capsule (100 mg) by mouth 3 times daily as needed for cough 42 capsule 0    blood glucose (CONTOUR NEXT TEST) test strip 100 strips by In Vitro route 4 times daily (before meals and nightly) Use to test blood sugar 4 times daily before meals and in the morning or as directed. 450 strip 3    Blood Glucose Monitoring Suppl ([employer] BLOOD GLUCOSE MONITOR) W/DEVICE KIT Dispense glucose meter, test strips and lancets covered by the patient insurance. Test 5 times per day. 3 month supply with 4 refills.      Continuous Blood Gluc Sensor (DEXCOM G6 SENSOR) MISC       Continuous Blood Gluc Transmit (DEXCOM G6 TRANSMITTER) MISC Change every 3 months. 1 each 3    Continuous Blood Gluc Transmit (DEXCOM G6 TRANSMITTER) MISC       Continuous Glucose Sensor (DEXCOM G6 SENSOR) MISC Change every 10 days. 3 each 3    FLUoxetine (PROZAC) 10 MG capsule Take 1 capsule by mouth daily with 20 mg capsule. Total daily dose = 30 mg. 90 capsule 4    FLUoxetine (PROZAC) 20 MG capsule Take 1 capsule by mouth daily with 10 mg  "capsule. Total daily dose = 30 mg. 90 capsule 4    Glucagon (BAQSIMI) 3 MG/DOSE nasal powder Spray 1 spray (3 mg) in nostril as needed for low blood sugar in the event of unconscious hypoglycemia or hypoglycemic seizure. May repeat dose if no response after 15 minutes. 1 each 1    insulin glargine (LANTUS PEN) 100 UNIT/ML pen Inject 20 Units Subcutaneous At Bedtime In case of pump failure 15 mL 3    insulin glargine (LANTUS SOLOSTAR) 100 UNIT/ML pen Take 17 unit(s) in case of pump failure 15 mL 1    insulin lispro (HUMALOG VIAL) 100 UNIT/ML vial Use as directed with insulin pump; max daily dose of 45 units 40 mL 4    insulin pen needle (B-D U/F) 31G X 5 MM miscellaneous Use 1 pen needle daily as directed. 100 each prn    insulin syringe-needle U-100 (29G X 1/2\" 0.5 ML) 29G X 1/2\" 0.5 ML miscellaneous For administering insulin at home.      levothyroxine (SYNTHROID/LEVOTHROID) 125 MCG tablet Take 1 tablet (125 mcg) by mouth every morning (before breakfast) Increase in dose 90 tablet 1    triamcinolone (KENALOG) 0.1 % external cream Apply topically 2 times daily (Patient not taking: Reported on 2/7/2024) 80 g 3       Family History  family history includes Albinism in her daughter; Anxiety Disorder in her daughter and daughter; Bipolar Disorder in her sister; Breast Cancer in her maternal aunt and sister; Hearing Loss in her daughter; Hypothyroidism in her sister; Kidney Disease in her mother; Lung Cancer in her mother; Migraines in her sister; Myasthenia gravis in her mother; Other - See Comments in her daughter.    Social History   reports that she has never smoked. She has never been exposed to tobacco smoke. She has never used smokeless tobacco. She reports current alcohol use of about 2.0 standard drinks of alcohol per week. She reports that she does not use drugs.     Past Medical History  Past Medical History:   Diagnosis Date    DDD (degenerative disc disease), lumbar     Dysthymic disorder     Hashimoto's " thyroiditis 03/29/2015    Iron deficiency anemia 11/16/2012    Type 1 diabetes mellitus without complications (H)     Dx at 10 years old,Uses Medtronic Insulin pump.       Past Surgical History:   Procedure Laterality Date    ARTHROTOMY TEMPOROMANDIBULAR JOINT  1985    BACK SURGERY  07/2020    hemilaminotomies    BREAST BIOPSY, RT/LT Left 04/12/2019    benign    COLONOSCOPY  05/24/2019    normal results, follow up 10 years    COLONOSCOPY N/A 05/24/2019    Procedure: COLONOSCOPY;  Surgeon: Maria Victoria Dubose MD;  Location: [facility] OR    DILATION AND CURETTAGE      x3; benign       Physical Exam    No exam today.      RESULTS  Creatinine   Date Value Ref Range Status   03/30/2023 0.79 0.51 - 0.95 mg/dL Final   06/25/2021 0.92 0.60 - 1.20 mg/dL Final     GFR Estimate   Date Value Ref Range Status   03/30/2023 88 >60 mL/min/1.73m2 Final     Comment:     eGFR calculated using 2021 CKD-EPI equation.   06/25/2021 64 >60 mL/min/[1.73_m2] Final     Hemoglobin A1C   Date Value Ref Range Status   04/15/2024 6.6 (H) 4.0 - 6.2 % Final   06/25/2021 7.1 (H) 4.0 - 6.0 % Final     Potassium   Date Value Ref Range Status   03/30/2023 4.1 3.4 - 5.3 mmol/L Final   08/02/2022 4.2 3.5 - 5.1 mmol/L Final   06/25/2021 3.9 3.5 - 5.1 mmol/L Final     ALT   Date Value Ref Range Status   03/30/2023 17 10 - 35 U/L Final   06/25/2021 15 7 - 52 U/L Final     AST   Date Value Ref Range Status   03/30/2023 19 10 - 35 U/L Final   06/25/2021 17 13 - 39 U/L Final     TSH   Date Value Ref Range Status   04/15/2024 0.36 0.30 - 4.20 uIU/mL Final   08/02/2022 11.09 (H) 0.40 - 4.00 mU/L Final     T4 Total   Date Value Ref Range Status   09/02/2016 7.96 6.09 - 12.23 ug/dL      T4 Free   Date Value Ref Range Status   05/07/2020 0.77 0.60 - 1.60 ng/dL Final     Free T4   Date Value Ref Range Status   11/08/2022 1.23 0.90 - 1.70 ng/dL Final       Cholesterol   Date Value Ref Range Status   04/15/2024 215 (H) <200 mg/dL Final   10/20/2023 280 (H) <200 mg/dL Final    06/25/2021 275 (H) <200 mg/dL Final   05/07/2020 255 (H) <200 mg/dL Final     HDL Cholesterol   Date Value Ref Range Status   06/25/2021 90 23 - 92 mg/dL Final   05/07/2020 84 23 - 92 mg/dL Final     Direct Measure HDL   Date Value Ref Range Status   04/15/2024 96 >=50 mg/dL Final   10/20/2023 105 >=50 mg/dL Final     LDL Cholesterol Calculated   Date Value Ref Range Status   04/15/2024 109 (H) <=100 mg/dL Final   10/20/2023 167 (H) <=100 mg/dL Final   06/25/2021 174 (H) <100 mg/dL Final     Comment:     Above desirable:  100-129 mg/dl  Borderline High:  130-159 mg/dL  High:             160-189 mg/dL  Very high:       >189 mg/dl     05/07/2020 156 (H) <100 mg/dL Final     Comment:     Above desirable:  100-129 mg/dl  Borderline High:  130-159 mg/dL  High:             160-189 mg/dL  Very high:       >189 mg/dl       Triglycerides   Date Value Ref Range Status   04/15/2024 50 <150 mg/dL Final   10/20/2023 41 <150 mg/dL Final   06/25/2021 54 <150 mg/dL Final   05/07/2020 76 <150 mg/dL Final         ASSESSMENT/PLAN:     TYPE 1 DIABETES MELLITUS: Well-controlled type 1 diabetes mellitus with no microvascular complications.  Only insulin pump setting change today was correction factor changed to 60 ( was 50 ) at 10 AM.  Patient seen by ophthalmology in July 2024 with no diabetic retinopathy.  Her urine microalbuminuria has been negative with normal creatinine/GFR.  No symptoms of diabetic neuropathy or foot ulcers.  BP is being monitored by primary care provider per patient.    LIPIDS:  in April 2024.  Patient taking Lipitor.  HYPOTHYROIDISM: TSH normal and April 2024.  Continue current levothyroxine dose.  WEIGHT: Encouraged patient to continue to make healthy food choices and to remain active.  FOLLOW UP: With  in Nov 2024.   Dexcom G7 sensor ordered today.    Time spent reviewing chart, labs and insulin pump download data today= 5 minutes.  Time for video visit today= 19 minutes.  Time for  documentation today= 15 minutes.    Total time for visit today= 39 minutes.    Mary Maria PA-C

## 2024-09-17 ENCOUNTER — THERAPY VISIT (OUTPATIENT)
Dept: PHYSICAL THERAPY | Facility: OTHER | Age: 54
End: 2024-09-17
Attending: FAMILY MEDICINE
Payer: COMMERCIAL

## 2024-09-17 ENCOUNTER — THERAPY VISIT (OUTPATIENT)
Dept: CHIROPRACTIC MEDICINE | Facility: OTHER | Age: 54
End: 2024-09-17
Attending: CHIROPRACTOR
Payer: COMMERCIAL

## 2024-09-17 VITALS
TEMPERATURE: 96.7 F | HEART RATE: 57 BPM | DIASTOLIC BLOOD PRESSURE: 96 MMHG | OXYGEN SATURATION: 99 % | RESPIRATION RATE: 16 BRPM | SYSTOLIC BLOOD PRESSURE: 140 MMHG

## 2024-09-17 DIAGNOSIS — R42 VERTIGO: ICD-10-CM

## 2024-09-17 DIAGNOSIS — R42 VERTIGO: Primary | ICD-10-CM

## 2024-09-17 PROCEDURE — 99202 OFFICE O/P NEW SF 15 MIN: CPT | Performed by: CHIROPRACTOR

## 2024-09-17 PROCEDURE — 97162 PT EVAL MOD COMPLEX 30 MIN: CPT | Mod: GP

## 2024-09-17 PROCEDURE — 95992 CANALITH REPOSITIONING PROC: CPT | Mod: GP

## 2024-09-17 NOTE — PROGRESS NOTES
PHYSICAL THERAPY EVALUATION  Type of Visit: Evaluation       Fall Risk Screen:  Fall screen completed by: PT  Have you fallen 2 or more times in the past year?: No  Have you fallen and had an injury in the past year?: No  Is patient a fall risk?: Yes; Department fall risk interventions implemented    Subjective       Presenting condition or subjective complaint: Pt is a 54 year old female referred to skilled PT services following an increase in dizziness that started Friday night into saturday after she got up to go to the bathroom and reports her legs were crossing, disorientated feeling, and unable to walk straight. pt reports dizziness is more shifty than room spinning. pt reports she has felt very off and nauseous and would just loose her balance and seemed to shift on saturday. she reports she did rest and take some dramamine and did help some. pt reports when she got  out of bed on sunday she lost her balance again. reports her dizziness gets worse with neck ext and with showering.  Date of onset: 09/13/24    Relevant medical history:     Past Medical History:   Diagnosis Date    DDD (degenerative disc disease), lumbar     Dysthymic disorder     Hashimoto's thyroiditis 03/29/2015    Iron deficiency anemia 11/16/2012    Type 1 diabetes mellitus without complications (H)     Dx at 10 years old,Uses Medtronic Insulin pump.      Dates & types of surgery:      Prior diagnostic imaging/testing results:       Prior therapy history for the same diagnosis, illness or injury: No      Prior Level of Function  Transfers: Independent  Ambulation: Independent  ADL: Independent  IADL: Driving, Finances, Housekeeping, Laundry, Meal preparation, Medication management, Work    Living Environment  Social support: With a significant other or spouse   Type of home: House   Stairs to enter the home: No       Ramp: No   Stairs inside the home: Yes 15 Is there a railing: Yes     Help at home: None  Equipment owned:       Employment:  Yes [employer]  Hobbies/Interests:      Patient goals for therapy: Move witjout dizzyness, balance issues and nauseau    Pain assessment: Pain denied     Objective      GAIT:   Level of [place]: Independent  Assistive Device(s): None  Gait Deviations: Base of support increased  Gait Distance: 50 feet       VESTIBULAR EVALUATION  ADDITIONAL HISTORY:  Description of symptoms: Off balance; Blurry or jumping vision; Attacks of dizziness; Nausea or vomiting  Dizzy attacks:   Start: saturday   Last attack: daily   Frequency of occurrences: minutes after sitting up but constantly feeling off balance while moving   Length of attack: minutes  Difficulty hearing: Both ears (not new)  Noise in ears? No no noise  Alleviates symptoms: rest  Worsens symptoms: movement, transitions  Activities that bring on symptoms: Turning while walking; Unstable surfaces; Bending over; Walking in the dark; Reaching up       Pertinent visual history: pt wears reading glasses, was having trouble with viewing her screen today.   Pertinent history of current vestibular problem: Hearing loss  DHI:      Cervicogenic Screen    Neck ROM Normal     Oculomotor Screen    Ocular ROM Pt having less coordination with horizontal eye motion, more to the R   Smooth Pursuit Less coordination to the R with visual tracking   Saccades Normal   VOR Decreased horizontal coordination   VOR Cancellation Abnormal   Head Impulse Test Unable to complete with correct velocity due to pt neck guarding   Convergence Testing Normal        Infrared Goggle Exam Vestibular Suppressant in Last 24 Hours? No  Exam Completed With:  frenzel lenses   Spontaneous Nystagmus Negative   Gaze Evoked Nystagmus Negative   Head Shake Horizontal Nystagmus Negative            Left Right   Tien-Hallpike Negative Upbeating R torsional with delayed onset by 8 seconds and lasting for 47 seconds, moderate ampliude.         [facility] Supine Roll Test Negative Negative   BPPV Canal(s): R  Posterior  BPPV Type: Canalithasis    Dynamic Visual Acuity (DVA)    Static Acuity (LogMar) negative   Horizontal Head Movement at 1 Hz (LogMar)    Horizontal Head Movement at 2 Hz (LogMar)           Assessment & Plan   CLINICAL IMPRESSIONS  Medical Diagnosis: vertigo    Treatment Diagnosis: R posterior canal BPPV   Impression/Assessment: Patient is a 54 year old female with R posterior canal BPPV, vestibular hypofunction complaints.  The following significant findings have been identified: Impaired gait, Instability, Dizziness, Disequilibrium , and Impaired vision. These impairments interfere with their ability to perform self care tasks, work tasks, recreational activities, household chores, household mobility, and community mobility as compared to previous level of function.     Clinical Decision Making (Complexity):  Clinical Presentation: Evolving/Changing  Clinical Presentation Rationale: based on medical and personal factors listed in PT evaluation  Clinical Decision Making (Complexity): Moderate complexity    PLAN OF CARE  Treatment Interventions:  Interventions: Gait Training, Manual Therapy, Neuromuscular Re-education, Therapeutic Activity, Therapeutic Exercise, Canalith Repositioning    Long Term Goals     PT Goal 1  Goal Identifier: Dizziness  Goal Description: pt will report a reduction in dizziness with head tilting up (in shower or looking in environment) by 50% to increase tolerance to self cares  Target Date: 10/29/24  PT Goal 2  Goal Identifier: transfers  Goal Description: Pt will complete supine to sitting on mat table x 1 without an increase in dizziness to increase stability going to the bathroom at night.  Target Date: 11/12/24  PT Goal 3  Goal Identifier: visual tracking  Goal Description: Pt will demonstrate ability to complete VOR x 1 in horizontal and seated for 30 sec to increase visual tracking for work tasks.  Target Date: 11/26/24      Frequency of Treatment: 6 times  Duration of  Treatment: 12 weeks    Education Assessment:        Risks and benefits of evaluation/treatment have been explained.   Patient/Family/caregiver agrees with Plan of Care.     Evaluation Time:     PT Eval, Moderate Complexity Minutes (88722): 30     Signing Clinician: Marci Trores DPT

## 2024-09-17 NOTE — PROGRESS NOTES
Patient stated it started 9/14/24. Patient is coming in today for symptoms of vertigo. Neck and head with a constant tightness and dizziness. 7/10 W24 9/10. Causing intermittent mild headaches.   Beatriz Desai on 9/17/2024 at 10:55 AM    Reviewed by CC.    PATIENT:  Floresita Hill is a 54 year old female presenting for primary complaint of vertigo.  She reports that symptoms started on 9/14/2024.  Head and neck tightness also felt.  She she rates her symptoms 7/10 to 9/10.  They are causing intermittent mild headaches as she normally does not experience headaches.  Patient states that when transitioning she feels very uneasy and woozy.  Patient reports being constantly nauseous but not to the point of throwing up.  When lying down or sitting up from lying down she experiences the room spinning.  She denies any injury or trauma that caused onset of symptoms.  Patient has no difficulty with facial motor control, swallowing or speaking.  She reports over the weekend taking Dramamine which did provide a little relief.  She states that she is leaving for vacation tomorrow.    PROBLEM:   Date of Initial Visit for this Episode:  9/17/2024    Visit #1    (DVPRS) Pain Rating Score : 7-Focus of attention, prevents doing daily activities (W24 9/10.) (09/17/24 1046)        See flowsheets in chart for details.  9/17/2024 9/17/2024    10:57 AM   Neck Disability Index (  Mike H. and Jen C. 1991. All rights reserved.; used with permission)   SECTION 1 - PAIN INTENSITY 2   SECTION 2 - PERSONAL CARE 1   SECTION 3 - LIFTING 1   SECTION 4 - READING 3   SECTION 5 - HEADACHES 1   SECTION 6 - CONCENTRATION 0   SECTION 7 - WORK 2   SECTION 8 - DRIVING 1   SECTION 9 - SLEEPING 0   SECTION 10 - RECREATION 2   Count 10   Sum 13   Raw Score: /50 13   Neck Disability Index Score: (%) 26 %           PAST MEDICAL HISTORY:  Past Medical History:   Diagnosis Date    DDD (degenerative disc disease), lumbar     Dysthymic  disorder     Hashimoto's thyroiditis 03/29/2015    Iron deficiency anemia 11/16/2012    Type 1 diabetes mellitus without complications (H)     Dx at 10 years old,Uses Medtronic Insulin pump.       PAST SURGICAL HISTORY:  Past Surgical History:   Procedure Laterality Date    ARTHROTOMY TEMPOROMANDIBULAR JOINT  1985    BACK SURGERY  07/2020    hemilaminotomies    BREAST BIOPSY, RT/LT Left 04/12/2019    benign    COLONOSCOPY  05/24/2019    normal results, follow up 10 years    COLONOSCOPY N/A 05/24/2019    Procedure: COLONOSCOPY;  Surgeon: Maria Victoria Dubose MD;  Location: [facility] OR    DILATION AND CURETTAGE      x3; benign       ALLERGIES:  Allergies   Allergen Reactions    Codeine Unknown     Loses consciousness    Statins Muscle Pain (Myalgia)     Mild achiness, may try again in future       CURRENT MEDICATIONS:  Current Outpatient Medications   Medication Sig Dispense Refill    aspirin EC 81 MG EC tablet Take 81 mg by mouth daily with food      atorvastatin (LIPITOR) 20 MG tablet Take 0.5 tablets (10 mg) by mouth daily 90 tablet 2    benzonatate (TESSALON) 100 MG capsule Take 1 capsule (100 mg) by mouth 3 times daily as needed for cough 42 capsule 0    blood glucose (CONTOUR NEXT TEST) test strip 100 strips by In Vitro route 4 times daily (before meals and nightly) Use to test blood sugar 4 times daily before meals and in the morning or as directed. 450 strip 3    Blood Glucose Monitoring Suppl ([employer]eto BLOOD GLUCOSE MONITOR) W/DEVICE KIT Dispense glucose meter, test strips and lancets covered by the patient insurance. Test 5 times per day. 3 month supply with 4 refills.      Continuous Blood Gluc Sensor (DEXCOM G6 SENSOR) MISC       Continuous Blood Gluc Transmit (DEXCOM G6 TRANSMITTER) MISC Change every 3 months. 1 each 3    Continuous Blood Gluc Transmit (DEXCOM G6 TRANSMITTER) MISC       Continuous Glucose Sensor (DEXCOM G6 SENSOR) MISC Change every 10 days. 3 each 3    Continuous Glucose Sensor (DEXCOM G7 SENSOR) MISC  "Change every 10 days. 3 each 5    FLUoxetine (PROZAC) 10 MG capsule Take 1 capsule by mouth daily with 20 mg capsule. Total daily dose = 30 mg. 90 capsule 4    FLUoxetine (PROZAC) 20 MG capsule Take 1 capsule by mouth daily with 10 mg capsule. Total daily dose = 30 mg. 90 capsule 4    Glucagon (BAQSIMI) 3 MG/DOSE nasal powder Spray 1 spray (3 mg) in nostril as needed for low blood sugar in the event of unconscious hypoglycemia or hypoglycemic seizure. May repeat dose if no response after 15 minutes. 1 each 1    insulin glargine (LANTUS PEN) 100 UNIT/ML pen Inject 20 Units Subcutaneous At Bedtime In case of pump failure 15 mL 3    insulin glargine (LANTUS SOLOSTAR) 100 UNIT/ML pen Take 17 unit(s) in case of pump failure 15 mL 1    insulin lispro (HUMALOG VIAL) 100 UNIT/ML vial Use as directed with insulin pump; max daily dose of 45 units 40 mL 4    insulin pen needle (B-D U/F) 31G X 5 MM miscellaneous Use 1 pen needle daily as directed. 100 each prn    insulin syringe-needle U-100 (29G X 1/2\" 0.5 ML) 29G X 1/2\" 0.5 ML miscellaneous For administering insulin at home.      levothyroxine (SYNTHROID/LEVOTHROID) 125 MCG tablet Take 1 tablet (125 mcg) by mouth every morning (before breakfast) Increase in dose 90 tablet 1       FAMILY HISTORY:  Family History   Problem Relation Age of Onset    Kidney Disease Mother         s/p transplant     Myasthenia gravis Mother     Lung Cancer Mother     Hypothyroidism Sister     Bipolar Disorder Sister     Migraines Sister     Breast Cancer Sister     Hearing Loss Daughter         bilateral    Other - See Comments Daughter         asd spectrum    Albinism Daughter     Anxiety Disorder Daughter     Anxiety Disorder Daughter     Breast Cancer Maternal Aunt         Cancer-breast       Patient Active Problem List   Diagnosis    Hashimoto's thyroiditis    Iron deficiency anemia    IUD (intrauterine device) in place    Type 1 diabetes mellitus without complication (H)    Rash    Chronic TMJ " pain    Family history of breast cancer in sister    At high risk for breast cancer    Spinal stenosis, lumbar region with neurogenic claudication    Peroneal tendinitis of left lower leg       ROS:  The patient denies any fevers, chills, vomiting, diarrhea, constipation,dysuria, hematuria, or urinary hesitancy or incontinence.  No shortness of breath, chest pain, or rashes.  Review of systems positive for nausea    OBJECTIVE:      PHYSICAL EXAM:   BP (!) 140/96 (BP Location: Right arm, Patient Position: Sitting, Cuff Size: Adult Regular)   Pulse 57   Temp (!) 96.7  F (35.9  C) (Tympanic)   Resp 16   LMP 11/01/2020 (Approximate)   SpO2 99%    Second blood pressure reading revealed 142/96, this is reported to be elevated for her  GENERAL APPEARANCE: healthy, alert, and no distress   GAIT: Gait is deliberate and slowed after initial transitional movements  SKIN: no suspicious lesions or rashes  NEURO: Gross motor function intact, cardinal fields of gaze reveals mild nystagmus when looking to the left but is otherwise unremarkable.  Cranial nerve VII unremarkable.  Convergence unremarkable.  Eye cover/uncover test unremarkable.  Pupillary reflex normal.   PSYCH:  mentation appears normal and affect normal/bright    MUSCULOSKELETAL:   Posture: Anterior head carriage, rounded shoulders.     Swivel chair test negative for reproduction of vertigo symptoms, mild increase in nauseousness but not significant    Going from sitting to lying on the left side down position did recreate symptoms with mild nystagmus and room spinning to the left.  Returning to an upright position also mildly increased symptoms.    ASSESSMENT: Floresita Hill is a 54 year old female presenting today suffering with vertigo.  Cervicogenic vertigo has been ruled out on orthopedic evaluation.  BPPV and vestibular neuritis cannot be ruled out on my evaluation.  I am recommending vestibular physical therapy to determine if BPPV or  vestibular neuritis is because of vertigo.  If BPPV is because of vertigo, canal repositioning techniques can be utilized to treat vertigo.  If vestibular neuritis is believed to be because of vertigo, time or meclizine may be recommended.     1. Vertigo        PLAN    Evaluation and Management:  57[address] Low to Moderate exam established patient 10 min  Total time spent on the date of this encounter, including chart review, history/exam and documentation: 20 mins      Plan of care is to refer patient to vestibular physical therapist today and potentially tomorrow morning before leaving on vacation to determine if BPPV or vestibular neuritis is cause of vertigo.  Referral to be cosigned by Dr. Leighton Irizarry MD.

## 2024-09-23 ENCOUNTER — THERAPY VISIT (OUTPATIENT)
Dept: PHYSICAL THERAPY | Facility: OTHER | Age: 54
End: 2024-09-23
Attending: FAMILY MEDICINE
Payer: COMMERCIAL

## 2024-09-23 DIAGNOSIS — R42 VERTIGO: Primary | ICD-10-CM

## 2024-09-23 PROCEDURE — 97112 NEUROMUSCULAR REEDUCATION: CPT | Mod: GP,59

## 2024-09-23 PROCEDURE — 95992 CANALITH REPOSITIONING PROC: CPT | Mod: GP

## 2024-10-04 ENCOUNTER — DOCUMENTATION ONLY (OUTPATIENT)
Dept: FAMILY MEDICINE | Facility: OTHER | Age: 54
End: 2024-10-04
Payer: COMMERCIAL

## 2024-10-04 DIAGNOSIS — E10.9 TYPE 1 DIABETES MELLITUS WITHOUT COMPLICATION (H): Primary | ICD-10-CM

## 2024-10-04 NOTE — PROGRESS NOTES
Floresita Hill has an upcoming lab appointment and there are no orders available. Please review and place future orders, as appropriate.    Emmanuel Olivo

## 2024-10-11 ENCOUNTER — LAB (OUTPATIENT)
Dept: LAB | Facility: OTHER | Age: 54
End: 2024-10-11
Attending: STUDENT IN AN ORGANIZED HEALTH CARE EDUCATION/TRAINING PROGRAM
Payer: COMMERCIAL

## 2024-10-11 DIAGNOSIS — E10.9 TYPE 1 DIABETES MELLITUS WITHOUT COMPLICATION (H): ICD-10-CM

## 2024-10-11 LAB
ANION GAP SERPL CALCULATED.3IONS-SCNC: 9 MMOL/L (ref 7–15)
BASOPHILS # BLD AUTO: 0 10E3/UL (ref 0–0.2)
BASOPHILS NFR BLD AUTO: 1 %
BUN SERPL-MCNC: 17.8 MG/DL (ref 6–20)
CALCIUM SERPL-MCNC: 9.8 MG/DL (ref 8.8–10.4)
CHLORIDE SERPL-SCNC: 102 MMOL/L (ref 98–107)
CHOLEST SERPL-MCNC: 213 MG/DL
CREAT SERPL-MCNC: 0.86 MG/DL (ref 0.51–0.95)
CREAT UR-MCNC: 237 MG/DL
EGFRCR SERPLBLD CKD-EPI 2021: 79 ML/MIN/1.73M2
EOSINOPHIL # BLD AUTO: 0.1 10E3/UL (ref 0–0.7)
EOSINOPHIL NFR BLD AUTO: 3 %
ERYTHROCYTE [DISTWIDTH] IN BLOOD BY AUTOMATED COUNT: 12.7 % (ref 10–15)
EST. AVERAGE GLUCOSE BLD GHB EST-MCNC: 137 MG/DL
FASTING STATUS PATIENT QL REPORTED: YES
FASTING STATUS PATIENT QL REPORTED: YES
GLUCOSE SERPL-MCNC: 172 MG/DL (ref 70–99)
HBA1C MFR BLD: 6.4 %
HCO3 SERPL-SCNC: 28 MMOL/L (ref 22–29)
HCT VFR BLD AUTO: 40.4 % (ref 35–47)
HDLC SERPL-MCNC: 98 MG/DL
HGB BLD-MCNC: 13.6 G/DL (ref 11.7–15.7)
IMM GRANULOCYTES # BLD: 0 10E3/UL
IMM GRANULOCYTES NFR BLD: 0 %
LDLC SERPL CALC-MCNC: 105 MG/DL
LYMPHOCYTES # BLD AUTO: 1.3 10E3/UL (ref 0.8–5.3)
LYMPHOCYTES NFR BLD AUTO: 29 %
MCH RBC QN AUTO: 31.3 PG (ref 26.5–33)
MCHC RBC AUTO-ENTMCNC: 33.7 G/DL (ref 31.5–36.5)
MCV RBC AUTO: 93 FL (ref 78–100)
MICROALBUMIN UR-MCNC: 12.8 MG/L
MICROALBUMIN/CREAT UR: 5.4 MG/G CR (ref 0–25)
MONOCYTES # BLD AUTO: 0.4 10E3/UL (ref 0–1.3)
MONOCYTES NFR BLD AUTO: 8 %
NEUTROPHILS # BLD AUTO: 2.5 10E3/UL (ref 1.6–8.3)
NEUTROPHILS NFR BLD AUTO: 59 %
NONHDLC SERPL-MCNC: 115 MG/DL
NRBC # BLD AUTO: 0 10E3/UL
NRBC BLD AUTO-RTO: 0 /100
PLATELET # BLD AUTO: 281 10E3/UL (ref 150–450)
POTASSIUM SERPL-SCNC: 4.6 MMOL/L (ref 3.4–5.3)
RBC # BLD AUTO: 4.34 10E6/UL (ref 3.8–5.2)
SODIUM SERPL-SCNC: 139 MMOL/L (ref 135–145)
TRIGL SERPL-MCNC: 52 MG/DL
TSH SERPL DL<=0.005 MIU/L-ACNC: 0.38 UIU/ML (ref 0.3–4.2)
WBC # BLD AUTO: 4.3 10E3/UL (ref 4–11)

## 2024-10-11 PROCEDURE — 85025 COMPLETE CBC W/AUTO DIFF WBC: CPT | Mod: ZL

## 2024-10-11 PROCEDURE — 84443 ASSAY THYROID STIM HORMONE: CPT | Mod: ZL

## 2024-10-11 PROCEDURE — 82043 UR ALBUMIN QUANTITATIVE: CPT | Mod: ZL

## 2024-10-11 PROCEDURE — 80048 BASIC METABOLIC PNL TOTAL CA: CPT | Mod: ZL

## 2024-10-11 PROCEDURE — 80061 LIPID PANEL: CPT | Mod: ZL

## 2024-10-11 PROCEDURE — 36415 COLL VENOUS BLD VENIPUNCTURE: CPT | Mod: ZL

## 2024-10-11 PROCEDURE — 83036 HEMOGLOBIN GLYCOSYLATED A1C: CPT | Mod: ZL

## 2024-10-14 NOTE — PROGRESS NOTES
Outcome for 10/14/24 2:46 PM: Data uploaded on [employer]  Adriana Chong MA  Outcome for 10/31/24 11:12 AM: Data obtained via [employer] website  Bobbilynn Grossaint, VF

## 2024-10-18 ENCOUNTER — HOSPITAL ENCOUNTER (OUTPATIENT)
Dept: MRI IMAGING | Facility: OTHER | Age: 54
Discharge: HOME | End: 2024-10-18
Attending: SURGERY | Admitting: SURGERY
Payer: COMMERCIAL

## 2024-10-18 DIAGNOSIS — Z91.89 AT HIGH RISK FOR BREAST CANCER: ICD-10-CM

## 2024-10-18 DIAGNOSIS — Z12.39 SCREENING FOR BREAST CANCER USING NON-MAMMOGRAM MODALITY: ICD-10-CM

## 2024-10-18 DIAGNOSIS — Z80.3 FAMILY HISTORY OF BREAST CANCER: ICD-10-CM

## 2024-10-18 PROCEDURE — A9575 INJ GADOTERATE MEGLUMI 0.1ML: HCPCS | Performed by: SURGERY

## 2024-10-18 PROCEDURE — 255N000002 HC RX 255 OP 636: Performed by: SURGERY

## 2024-10-18 PROCEDURE — 77049 MRI BREAST C-+ W/CAD BI: CPT

## 2024-10-18 RX ORDER — GADOTERATE MEGLUMINE 376.9 MG/ML
20 INJECTION INTRAVENOUS ONCE
Status: COMPLETED | OUTPATIENT
Start: 2024-10-18 | End: 2024-10-18

## 2024-10-18 RX ADMIN — GADOTERATE MEGLUMINE 16 ML: 376.9 INJECTION INTRAVENOUS at 15:54

## 2024-10-25 ENCOUNTER — THERAPY VISIT (OUTPATIENT)
Dept: PHYSICAL THERAPY | Facility: OTHER | Age: 54
End: 2024-10-25
Attending: FAMILY MEDICINE
Payer: COMMERCIAL

## 2024-10-25 DIAGNOSIS — R42 VERTIGO: Primary | ICD-10-CM

## 2024-10-25 PROCEDURE — 97112 NEUROMUSCULAR REEDUCATION: CPT | Mod: GP

## 2024-11-04 ENCOUNTER — VIRTUAL VISIT (OUTPATIENT)
Dept: ENDOCRINOLOGY | Facility: CLINIC | Age: 54
End: 2024-11-04
Payer: COMMERCIAL

## 2024-11-04 DIAGNOSIS — E10.9 TYPE 1 DIABETES MELLITUS WITHOUT COMPLICATION (H): ICD-10-CM

## 2024-11-04 PROCEDURE — G2211 COMPLEX E/M VISIT ADD ON: HCPCS | Mod: 95 | Performed by: STUDENT IN AN ORGANIZED HEALTH CARE EDUCATION/TRAINING PROGRAM

## 2024-11-04 PROCEDURE — 99214 OFFICE O/P EST MOD 30 MIN: CPT | Mod: 95 | Performed by: STUDENT IN AN ORGANIZED HEALTH CARE EDUCATION/TRAINING PROGRAM

## 2024-11-04 RX ORDER — ATORVASTATIN CALCIUM 20 MG/1
20 TABLET, FILM COATED ORAL DAILY
Qty: 90 TABLET | Refills: 1 | Status: SHIPPED | OUTPATIENT
Start: 2024-11-04

## 2024-11-04 RX ORDER — INSULIN GLARGINE 100 [IU]/ML
INJECTION, SOLUTION SUBCUTANEOUS
Qty: 15 ML | Refills: 1 | Status: SHIPPED | OUTPATIENT
Start: 2024-11-04

## 2024-11-04 ASSESSMENT — PAIN SCALES - GENERAL: PAINLEVEL_OUTOF10: NO PAIN (0)

## 2024-11-04 NOTE — PROGRESS NOTES
Endocrinology Clinic Visit       Video-Visit Details    Type of service:  Video Visit    Joined the call at 11/4/2024, 12:36:58 pm.  Left the call at 11/4/2024, 12:57:25 pm.    Originating Location (pt. Location): Home        Distant Location (provider location):  Off-site    Mode of Communication:  Video Conference via [employer]    Physician has received verbal consent for a Video Visit from the patient? Yes         NAME:  Floresita Hill  PCP:  Gustavo Alvarez  MRN:  [id number]  Reason for Consult:  DM1  Requesting Provider:  Gustavo Marvin    Chief Complaint     Chief Complaint   Patient presents with    Follow Up       History of Present Illness     Floresita Hill is a 53 year old female who is seen in video visit for DM1. Last seen 4/2024 by me . She saw armaan denny 8/2024    The patient was diagnosed with type 1 diabetes age 10. She was initially started on insulin pump in 2000. She had the current Tandem pump for the past 5 years. She never had any complications from her DM. No DKA since diagnosis. She had frustration with hypoglycemia while exercising. She walks 12-1pm and 5:30pm-7pm. She was skipping carb coverage completely for her meals. she established with CDE; she started using exercise mode which is helping. She had significant improvement in her hypoglycemia since then.    Today she reported concerns about hypoglycemic during the night. She does not feel them but she is waking up from the alarm.    Previous A1C in records reviewed.   Lab Results   Component Value Date    A1C 6.4 (H) 10/11/2024    A1C 6.6 (H) 04/15/2024    A1C 6.7 (H) 10/20/2023    A1C 6.5 (H) 11/08/2022    A1C 6.4 (H) 08/02/2022    A1C 7.1 (H) 06/25/2021    A1C 7.0 (H) 01/07/2021    A1C 6.9 (H) 07/02/2020    A1C 7.2 (H) 05/07/2020    A1C 7.8 (H) 04/10/2019       Weight is stable  Wt Readings from Last 4 Encounters:   03/25/24 77.6 kg (171 lb)   12/19/23 77.1 kg (170 lb)   10/19/23 77.1 kg  (170 lb)   10/04/23 78.9 kg (174 lb)           Diabetes Care/Complications:   Eyes: 2024 was her last eye exam, no DR  Kidneys: last urine alb was 10/2024, negative.  Nerves: she has peripheral neuropathy in her left foot, due to disc disease s/p laminectomy. Never had infections , ulcers or amputations.  Smoking: no  Blood Pressure: overall in control, had few episodes of high reading, normal BP at home.  Lipids:  on 10/2023. She was on statin in the past she felt tingling in her feet and stopped it few years ago. Restarted on low dose lipitor 10/2023, tolerated well.  Last LDL down to 109.  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  Back up insulin: yes, lantus. Reported that it is   Glucagon: Baqsimi    Previous DM related Hospitalization: no    Current treatment strategy:     Blood Glucose Monitoring:        Outcome for 10/14/24 2:46 PM: Data uploaded on [employer]  Adriana Chong MA  Outcome for 10/31/24 11:12 AM: Data obtained via [employer] website  Bobbilynn Grossaint, VF                                          Diet: 2 meals: lunch at 12pm and dinner at 5:30  Snacks:3-4 pm a protein and carb  Sometimes snack at home  Drinks: no sugary drinks, 1 glass of alcohol once a week    Exercise: walks regularly, twice a day. After lunch and after dinner.    # Hashimoto hypothyroidism  Diagnosed 10 years ago, has been on lt4 since. Last TSH 2024 wnl.    Problem List     Patient Active Problem List   Diagnosis    Hashimoto's thyroiditis    Iron deficiency anemia    IUD (intrauterine device) in place    Type 1 diabetes mellitus without complication (H)    Rash    Chronic TMJ pain    Family history of breast cancer in sister    At high risk for breast cancer    Spinal stenosis, lumbar region with neurogenic claudication    Peroneal tendinitis of left lower leg    Vertigo        Medications     Current Outpatient Medications   Medication Sig Dispense Refill    aspirin EC 81  MG EC tablet Take 81 mg by mouth daily with food      atorvastatin (LIPITOR) 20 MG tablet Take 0.5 tablets (10 mg) by mouth daily 90 tablet 2    benzonatate (TESSALON) 100 MG capsule Take 1 capsule (100 mg) by mouth 3 times daily as needed for cough 42 capsule 0    blood glucose (CONTOUR NEXT TEST) test strip 100 strips by In Vitro route 4 times daily (before meals and nightly) Use to test blood sugar 4 times daily before meals and in the morning or as directed. 450 strip 3    Blood Glucose Monitoring Suppl ([employer] BLOOD GLUCOSE MONITOR) W/DEVICE KIT Dispense glucose meter, test strips and lancets covered by the patient insurance. Test 5 times per day. 3 month supply with 4 refills.      Continuous Blood Gluc Sensor (DEXCOM G6 SENSOR) MISC       Continuous Blood Gluc Transmit (DEXCOM G6 TRANSMITTER) MISC Change every 3 months. 1 each 3    Continuous Blood Gluc Transmit (DEXCOM G6 TRANSMITTER) MISC       Continuous Glucose Sensor (DEXCOM G6 SENSOR) MISC Change every 10 days. 3 each 3    Continuous Glucose Sensor (DEXCOM G7 SENSOR) MISC Change every 10 days. 3 each 5    FLUoxetine (PROZAC) 10 MG capsule Take 1 capsule by mouth daily with 20 mg capsule. Total daily dose = 30 mg. 90 capsule 4    FLUoxetine (PROZAC) 20 MG capsule Take 1 capsule by mouth daily with 10 mg capsule. Total daily dose = 30 mg. 90 capsule 4    Glucagon (BAQSIMI) 3 MG/DOSE nasal powder Spray 1 spray (3 mg) in nostril as needed for low blood sugar in the event of unconscious hypoglycemia or hypoglycemic seizure. May repeat dose if no response after 15 minutes. 1 each 1    insulin glargine (LANTUS PEN) 100 UNIT/ML pen Inject 20 Units Subcutaneous At Bedtime In case of pump failure 15 mL 3    insulin glargine (LANTUS SOLOSTAR) 100 UNIT/ML pen Take 17 unit(s) in case of pump failure 15 mL 1    insulin lispro (HUMALOG VIAL) 100 UNIT/ML vial Use as directed with insulin pump; max daily dose of 45 units 40 mL 4    insulin pen needle (B-D U/F) 31G X 5  "MM miscellaneous Use 1 pen needle daily as directed. 100 each prn    insulin syringe-needle U-100 (29G X 1/2\" 0.5 ML) 29G X 1/2\" 0.5 ML miscellaneous For administering insulin at home.      levothyroxine (SYNTHROID/LEVOTHROID) 125 MCG tablet Take 1 tablet (125 mcg) by mouth every morning (before breakfast) Increase in dose 90 tablet 1     No current facility-administered medications for this visit.        Allergies     Allergies   Allergen Reactions    Codeine Unknown     Loses consciousness    Statins Muscle Pain (Myalgia)     Mild achiness, may try again in future       Medical / Surgical History     Past Medical History:   Diagnosis Date    DDD (degenerative disc disease), lumbar     Dysthymic disorder     Hashimoto's thyroiditis 03/29/2015    Iron deficiency anemia 11/16/2012    Type 1 diabetes mellitus without complications (H)     Dx at 10 years old,Uses Medtronic Insulin pump.     Past Surgical History:   Procedure Laterality Date    ARTHROTOMY TEMPOROMANDIBULAR JOINT  1985    BACK SURGERY  07/2020    hemilaminotomies    BREAST BIOPSY, RT/LT Left 04/12/2019    benign    COLONOSCOPY  05/24/2019    normal results, follow up 10 years    COLONOSCOPY N/A 05/24/2019    Procedure: COLONOSCOPY;  Surgeon: Maria Victoria Dubose MD;  Location: [facility] OR    DILATION AND CURETTAGE      x3; benign       Social History     Social History     Socioeconomic History    Marital status:      Spouse name: Not on file    Number of children: Not on file    Years of education: Not on file    Highest education level: Not on file   Occupational History    Not on file   Tobacco Use    Smoking status: Never     Passive exposure: Never    Smokeless tobacco: Never   Vaping Use    Vaping status: Never Used   Substance and Sexual Activity    Alcohol use: Yes     Alcohol/week: 2.0 standard drinks of alcohol     Types: 2 Glasses of wine per week     Comment: reduced, maybe one or two drinks a week    Drug use: No    Sexual activity: Not Currently "     Partners: Male   Other Topics Concern    Parent/sibling w/ CABG, MI or angioplasty before 65F 55M? Not Asked   Social History Narrative    , moved here from [place].   Sense of humor intact.   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  [place] - .  2 kids, Rebecca (1999) lives in [place] (AR/marketing), Olga (2002) works in STEM; graduated from [employer] in spring 2023.  Works in [employer] at [employer].       Social Drivers of Health     Financial Resource Strain: Low Risk  (10/2/2023)    Financial Resource Strain     Within the past 12 months, have you or your family members you live with been unable to get utilities (heat, electricity) when it was really needed?: No   Food Insecurity: Low Risk  (10/2/2023)    Food Insecurity     Within the past 12 months, did you worry that your food would run out before you got money to buy more?: No     Within the past 12 months, did the food you bought just not last and you didn t have money to get more?: No   Transportation Needs: Low Risk  (10/2/2023)    Transportation Needs     Within the past 12 months, has lack of transportation kept you from medical appointments, getting your medicines, non-medical meetings or appointments, work, or from getting things that you need?: No   Physical Activity: Not on file   Stress: Not on file   Social Connections: Not on file   Interpersonal Safety: Low Risk  (9/17/2024)    Interpersonal Safety     Do you feel physically and emotionally safe where you currently live?: Yes     Within the past 12 months, have you been hit, slapped, kicked or otherwise physically hurt by someone?: No     Within the past 12 months, have you been humiliated or emotionally abused in other ways by your partner or ex-partner?: No   Housing Stability: Low Risk  (10/2/2023)    Housing Stability     Do you have housing? : Yes     Are you worried about losing your housing?: No       Family History     Family History   Problem Relation Age of Onset     Kidney Disease Mother         s/p transplant     Myasthenia gravis Mother     Lung Cancer Mother     Hypothyroidism Sister     Bipolar Disorder Sister     Migraines Sister     Breast Cancer Sister     Hearing Loss Daughter         bilateral    Other - See Comments Daughter         asd spectrum    Albinism Daughter     Anxiety Disorder Daughter     Anxiety Disorder Daughter     Breast Cancer Maternal Aunt         Cancer-breast       ROS     12 ROS completed, pertinent positive and negative in HPI    Physical Exam   LMP 11/01/2020 (Approximate)    GENERAL: Healthy, alert and no distress  EYES: Eyes grossly normal to inspection.  No discharge or erythema, or obvious scleral/conjunctival abnormalities.  RESP: No audible wheeze, cough, or visible cyanosis.  No visible retractions or increased work of breathing.    SKIN: Visible skin clear. No significant rash, abnormal pigmentation or lesions.  NEURO: Cranial nerves grossly intact.  Mentation and speech appropriate for age.  PSYCH: Mentation appears normal, affect normal/bright, judgement and insight intact, normal speech and appearance well-groomed.     Labs/Imaging     Pertinent Labs were reviewed and updated in EPIC and discussed briefly.  Radiology Results were  reviewed and updated in EPIC and discussed briefly.    Summary of recent findings:   Lab Results   Component Value Date    A1C 6.5 11/08/2022    A1C 6.4 08/02/2022    A1C 7.0 12/08/2021    A1C 7.1 06/25/2021    A1C 7.0 01/07/2021    A1C 6.9 07/02/2020    A1C 7.2 05/07/2020    A1C 7.8 04/10/2019       TSH   Date Value Ref Range Status   10/11/2024 0.38 0.30 - 4.20 uIU/mL Final   04/15/2024 0.36 0.30 - 4.20 uIU/mL Final   10/20/2023 0.64 0.30 - 4.20 uIU/mL Final   03/07/2023 1.21 0.30 - 4.20 uIU/mL Final   11/08/2022 5.99 (H) 0.30 - 4.20 uIU/mL Final   08/02/2022 11.09 (H) 0.40 - 4.00 mU/L Final     T4 Total   Date Value Ref Range Status   09/02/2016 7.96 6.09 - 12.23 ug/dL    11/18/2015 8.08 6.09 - 12.23 ug/dL  "   06/03/2015 7.32 6.09 - 12.23 ug/dL      T4 Free   Date Value Ref Range Status   05/07/2020 0.77 0.60 - 1.60 ng/dL Final   04/10/2019 0.81 0.60 - 1.60 ng/dL Final   03/02/2018 0.96 0.60 - 1.60 ng/dL Final   04/22/2015 1.70 (H) 0.58 - 1.64 ng/dL      Free T4   Date Value Ref Range Status   11/08/2022 1.23 0.90 - 1.70 ng/dL Final   08/02/2022 1.00 0.60 - 1.60 ng/dL Final       Creatinine   Date Value Ref Range Status   10/11/2024 0.86 0.51 - 0.95 mg/dL Final   06/25/2021 0.92 0.60 - 1.20 mg/dL Final       Recent Labs   Lab Test 08/02/22  0915 06/25/21  1135   CHOL 267* 275*   HDL 78 90   * 174*   TRIG 60 54       No results found for: \"[id number]\", \"[id number]\", \"[id number]\"    I personally reviewed the patient's outside records from [facility] EMR and Care Everywhere. Summary of pertinent findings in HPI.    Impression / Plan     1. Diabetes Mellitus: Type 1  good control. Very happy with her glycemic control since using exercise mode. She is having lows at night, she will confirm with glucometer. We made the following changes:  - 10 pm correction factor from 1:50 to 1:60. Change time of start of sleep from 10 pm to 9 pm.    I prescribed lantus 17 unit(s) as back up plan.       2. Diabetes Complications: none, reviewed her labs from 10/2024     3. Blood Pressure Management: Blood pressure is controlled. Currently is not on pharmacotherapy for this.     4.Lipid Management: Per the new ACC/CLINT/NHLBI guidelines, statins are recommended for individuals with diabetes aged 40-75 with LDL  without ASCVD, and for any individual with ASCVD. LDL above goal. she is ok with increasing the dose to 20 mg      5. Smoking Status: Patient Pt is smoke free..     6. Hypothyroidism: on lt4 125 mcg daily. Last sth wnl on 5/2024.      Review of the result(s) of each unique test - A1c and diabetes related labs.            Test and/or medications prescribed today:  No orders of the defined types were placed in this " encounter.        Follow up: prefers 6 m follow-up     The longitudinal plan of care for the diagnosis(es)/condition(s) as documented were addressed during this visit. Due to the added complexity in care, I will continue to support Floresita in the subsequent management and with ongoing continuity of care.        Hermelindo Wesley MD  Endocrinology, Diabetes and Metabolism  [facility]

## 2024-11-04 NOTE — NURSING NOTE
Is the patient currently in the state of [place]? YES    Visit mode:VIDEO    If the visit is dropped, the patient can be reconnected by: VIDEO VISIT: Send to e-mail at: [email or web address]    Will anyone else be joining the visit? NO  (If patient encounters technical issues they should call [phone])    How would you like to obtain your AVS? MyChart    Are changes needed to the allergy or medication list? No    Are refills needed on medications prescribed by this physician? NO    Reason for visit: Follow Up    Nereida COLLINS

## 2024-11-04 NOTE — LETTER
11/4/2024      Floresita Hill  [address] [facility] [address]      Dear Colleague,    Thank you for referring your patient, Floresita Hill, to the [facility] [place] [facility]. Please see a copy of my visit note below.    Outcome for 10/14/24 2:46 PM: Data uploaded on Tandem  Adriana Chong MA  Outcome for 10/31/24 11:12 AM: Data obtained via Tandem website  Bobbilynn Grossaint, VF                      Endocrinology Clinic Visit       Video-Visit Details    Type of service:  Video Visit    Joined the call at 11/4/2024, 12:36:58 pm.  Left the call at 11/4/2024, 12:57:25 pm.    Originating Location (pt. Location): Home        Distant Location (provider location):  Off-site    Mode of Communication:  Video Conference via FantasyHub    Physician has received verbal consent for a Video Visit from the patient? Yes         NAME:  Floresita Hill  PCP:  Gustavo Alvarez  MRN:  [id number]  Reason for Consult:  DM1  Requesting Provider:  Gustavo Marvin    Chief Complaint     Chief Complaint   Patient presents with     Follow Up       History of Present Illness     Floresita Hill is a 53 year old female who is seen in video visit for DM1. Last seen 4/2024 by me . She saw armaan denny 8/2024    The patient was diagnosed with type 1 diabetes age 10. She was initially started on insulin pump in 2000. She had the current Tandem pump for the past 5 years. She never had any complications from her DM. No DKA since diagnosis. She had frustration with hypoglycemia while exercising. She walks 12-1pm and 5:30pm-7pm. She was skipping carb coverage completely for her meals. she established with CDE; she started using exercise mode which is helping. She had significant improvement in her hypoglycemia since then.    Today she reported concerns about hypoglycemic during the night. She does not feel them but she is waking up from the  alarm.    Previous A1C in records reviewed.   Lab Results   Component Value Date    A1C 6.4 (H) 10/11/2024    A1C 6.6 (H) 04/15/2024    A1C 6.7 (H) 10/20/2023    A1C 6.5 (H) 2022    A1C 6.4 (H) 2022    A1C 7.1 (H) 2021    A1C 7.0 (H) 2021    A1C 6.9 (H) 2020    A1C 7.2 (H) 2020    A1C 7.8 (H) 04/10/2019       Weight is stable  Wt Readings from Last 4 Encounters:   24 77.6 kg (171 lb)   23 77.1 kg (170 lb)   10/19/23 77.1 kg (170 lb)   10/04/23 78.9 kg (174 lb)           Diabetes Care/Complications:   Eyes: 2024 was her last eye exam, no DR  Kidneys: last urine alb was 10/2024, negative.  Nerves: she has peripheral neuropathy in her left foot, due to disc disease s/p laminectomy. Never had infections , ulcers or amputations.  Smoking: no  Blood Pressure: overall in control, had few episodes of high reading, normal BP at home.  Lipids:  on 10/2023. She was on statin in the past she felt tingling in her feet and stopped it few years ago. Restarted on low dose lipitor 10/2023, tolerated well.  Last LDL down to 109.  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  Back up insulin: yes, lantus. Reported that it is   Glucagon: Baqsimi    Previous DM related Hospitalization: no    Current treatment strategy:     Blood Glucose Monitoring:        Outcome for 10/14/24 2:46 PM: Data uploaded on Tandem  Adriana Chong MA  Outcome for 10/31/24 11:12 AM: Data obtained via Tandem website  Bobbilynn Grossaint, VF                                          Diet: 2 meals: lunch at 12pm and dinner at 5:30  Snacks:3-4 pm a protein and carb  Sometimes snack at home  Drinks: no sugary drinks, 1 glass of alcohol once a week    Exercise: walks regularly, twice a day. After lunch and after dinner.    # Hashimoto hypothyroidism  Diagnosed 10 years ago, has been on lt4 since. Last TSH 2024 wnl.    Problem List     Patient Active Problem List    Diagnosis     Hashimoto's thyroiditis     Iron deficiency anemia     IUD (intrauterine device) in place     Type 1 diabetes mellitus without complication (H)     Rash     Chronic TMJ pain     Family history of breast cancer in sister     At high risk for breast cancer     Spinal stenosis, lumbar region with neurogenic claudication     Peroneal tendinitis of left lower leg     Vertigo        Medications     Current Outpatient Medications   Medication Sig Dispense Refill     aspirin EC 81 MG EC tablet Take 81 mg by mouth daily with food       atorvastatin (LIPITOR) 20 MG tablet Take 0.5 tablets (10 mg) by mouth daily 90 tablet 2     benzonatate (TESSALON) 100 MG capsule Take 1 capsule (100 mg) by mouth 3 times daily as needed for cough 42 capsule 0     blood glucose (CONTOUR NEXT TEST) test strip 100 strips by In Vitro route 4 times daily (before meals and nightly) Use to test blood sugar 4 times daily before meals and in the morning or as directed. 450 strip 3     Blood Glucose Monitoring Suppl ([employer]pluck BLOOD GLUCOSE MONITOR) W/DEVICE KIT Dispense glucose meter, test strips and lancets covered by the patient insurance. Test 5 times per day. 3 month supply with 4 refills.       Continuous Blood Gluc Sensor (DEXCOM G6 SENSOR) MISC        Continuous Blood Gluc Transmit (DEXCOM G6 TRANSMITTER) MISC Change every 3 months. 1 each 3     Continuous Blood Gluc Transmit (DEXCOM G6 TRANSMITTER) MISC        Continuous Glucose Sensor (DEXCOM G6 SENSOR) MISC Change every 10 days. 3 each 3     Continuous Glucose Sensor (DEXCOM G7 SENSOR) MISC Change every 10 days. 3 each 5     FLUoxetine (PROZAC) 10 MG capsule Take 1 capsule by mouth daily with 20 mg capsule. Total daily dose = 30 mg. 90 capsule 4     FLUoxetine (PROZAC) 20 MG capsule Take 1 capsule by mouth daily with 10 mg capsule. Total daily dose = 30 mg. 90 capsule 4     Glucagon (BAQSIMI) 3 MG/DOSE nasal powder Spray 1 spray (3 mg) in nostril as needed for low blood sugar  "in the event of unconscious hypoglycemia or hypoglycemic seizure. May repeat dose if no response after 15 minutes. 1 each 1     insulin glargine (LANTUS PEN) 100 UNIT/ML pen Inject 20 Units Subcutaneous At Bedtime In case of pump failure 15 mL 3     insulin glargine (LANTUS SOLOSTAR) 100 UNIT/ML pen Take 17 unit(s) in case of pump failure 15 mL 1     insulin lispro (HUMALOG VIAL) 100 UNIT/ML vial Use as directed with insulin pump; max daily dose of 45 units 40 mL 4     insulin pen needle (B-D U/F) 31G X 5 MM miscellaneous Use 1 pen needle daily as directed. 100 each prn     insulin syringe-needle U-100 (29G X 1/2\" 0.5 ML) 29G X 1/2\" 0.5 ML miscellaneous For administering insulin at home.       levothyroxine (SYNTHROID/LEVOTHROID) 125 MCG tablet Take 1 tablet (125 mcg) by mouth every morning (before breakfast) Increase in dose 90 tablet 1     No current facility-administered medications for this visit.        Allergies     Allergies   Allergen Reactions     Codeine Unknown     Loses consciousness     Statins Muscle Pain (Myalgia)     Mild achiness, may try again in future       Medical / Surgical History     Past Medical History:   Diagnosis Date     DDD (degenerative disc disease), lumbar      Dysthymic disorder      Hashimoto's thyroiditis 03/29/2015     Iron deficiency anemia 11/16/2012     Type 1 diabetes mellitus without complications (H)     Dx at 10 years old,Uses Medtronic Insulin pump.     Past Surgical History:   Procedure Laterality Date     ARTHROTOMY TEMPOROMANDIBULAR JOINT  1985     BACK SURGERY  07/2020    hemilaminotomies     BREAST BIOPSY, RT/LT Left 04/12/2019    benign     COLONOSCOPY  05/24/2019    normal results, follow up 10 years     COLONOSCOPY N/A 05/24/2019    Procedure: COLONOSCOPY;  Surgeon: Maria Victoria Dubose MD;  Location: [facility] OR     DILATION AND CURETTAGE      x3; benign       Social History     Social History     Socioeconomic History     Marital status:      Spouse name: Not on file "     Number of children: Not on file     Years of education: Not on file     Highest education level: Not on file   Occupational History     Not on file   Tobacco Use     Smoking status: Never     Passive exposure: Never     Smokeless tobacco: Never   Vaping Use     Vaping status: Never Used   Substance and Sexual Activity     Alcohol use: Yes     Alcohol/week: 2.0 standard drinks of alcohol     Types: 2 Glasses of wine per week     Comment: reduced, maybe one or two drinks a week     Drug use: No     Sexual activity: Not Currently     Partners: Male   Other Topics Concern     Parent/sibling w/ CABG, MI or angioplasty before 65F 55M? Not Asked   Social History Narrative    , moved here from [place].   Sense of humor intact.   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  [place] - .  2 kids, Rebecca (1999) lives in [place] ([place]mark[place]), Olga (2002) works in STEM; graduated from [employer] in spring 2023.  Works in [employer] at [employer].       Social Drivers of Health     Financial Resource Strain: Low Risk  (10/2/2023)    Financial Resource Strain      Within the past 12 months, have you or your family members you live with been unable to get utilities (heat, electricity) when it was really needed?: No   Food Insecurity: Low Risk  (10/2/2023)    Food Insecurity      Within the past 12 months, did you worry that your food would run out before you got money to buy more?: No      Within the past 12 months, did the food you bought just not last and you didn t have money to get more?: No   Transportation Needs: Low Risk  (10/2/2023)    Transportation Needs      Within the past 12 months, has lack of transportation kept you from medical appointments, getting your medicines, non-medical meetings or appointments, work, or from getting things that you need?: No   Physical Activity: Not on file   Stress: Not on file   Social Connections: Not on file   Interpersonal Safety: Low Risk  (9/17/2024)    Interpersonal Safety       Do you feel physically and emotionally safe where you currently live?: Yes      Within the past 12 months, have you been hit, slapped, kicked or otherwise physically hurt by someone?: No      Within the past 12 months, have you been humiliated or emotionally abused in other ways by your partner or ex-partner?: No   Housing Stability: Low Risk  (10/2/2023)    Housing Stability      Do you have housing? : Yes      Are you worried about losing your housing?: No       Family History     Family History   Problem Relation Age of Onset     Kidney Disease Mother         s/p transplant      Myasthenia gravis Mother      Lung Cancer Mother      Hypothyroidism Sister      Bipolar Disorder Sister      Migraines Sister      Breast Cancer Sister      Hearing Loss Daughter         bilateral     Other - See Comments Daughter         asd spectrum     Albinism Daughter      Anxiety Disorder Daughter      Anxiety Disorder Daughter      Breast Cancer Maternal Aunt         Cancer-breast       ROS     12 ROS completed, pertinent positive and negative in HPI    Physical Exam   LMP 11/01/2020 (Approximate)    GENERAL: Healthy, alert and no distress  EYES: Eyes grossly normal to inspection.  No discharge or erythema, or obvious scleral/conjunctival abnormalities.  RESP: No audible wheeze, cough, or visible cyanosis.  No visible retractions or increased work of breathing.    SKIN: Visible skin clear. No significant rash, abnormal pigmentation or lesions.  NEURO: Cranial nerves grossly intact.  Mentation and speech appropriate for age.  PSYCH: Mentation appears normal, affect normal/bright, judgement and insight intact, normal speech and appearance well-groomed.     Labs/Imaging     Pertinent Labs were reviewed and updated in EPIC and discussed briefly.  Radiology Results were  reviewed and updated in EPIC and discussed briefly.    Summary of recent findings:   Lab Results   Component Value Date    A1C 6.5 11/08/2022    A1C 6.4  "08/02/2022    A1C 7.0 12/08/2021    A1C 7.1 06/25/2021    A1C 7.0 01/07/2021    A1C 6.9 07/02/2020    A1C 7.2 05/07/2020    A1C 7.8 04/10/2019       TSH   Date Value Ref Range Status   10/11/2024 0.38 0.30 - 4.20 uIU/mL Final   04/15/2024 0.36 0.30 - 4.20 uIU/mL Final   10/20/2023 0.64 0.30 - 4.20 uIU/mL Final   03/07/2023 1.21 0.30 - 4.20 uIU/mL Final   11/08/2022 5.99 (H) 0.30 - 4.20 uIU/mL Final   08/02/2022 11.09 (H) 0.40 - 4.00 mU/L Final     T4 Total   Date Value Ref Range Status   09/02/2016 7.96 6.09 - 12.23 ug/dL    11/18/2015 8.08 6.09 - 12.23 ug/dL    06/03/2015 7.32 6.09 - 12.23 ug/dL      T4 Free   Date Value Ref Range Status   05/07/2020 0.77 0.60 - 1.60 ng/dL Final   04/10/2019 0.81 0.60 - 1.60 ng/dL Final   03/02/2018 0.96 0.60 - 1.60 ng/dL Final   04/22/2015 1.70 (H) 0.58 - 1.64 ng/dL      Free T4   Date Value Ref Range Status   11/08/2022 1.23 0.90 - 1.70 ng/dL Final   08/02/2022 1.00 0.60 - 1.60 ng/dL Final       Creatinine   Date Value Ref Range Status   10/11/2024 0.86 0.51 - 0.95 mg/dL Final   06/25/2021 0.92 0.60 - 1.20 mg/dL Final       Recent Labs   Lab Test 08/02/22  0915 06/25/21  1135   CHOL 267* 275*   HDL 78 90   * 174*   TRIG 60 54       No results found for: \"[id number]\", \"[id number]\", \"[id number]\"    I personally reviewed the patient's outside records from [facility] EMR and Care Everywhere. Summary of pertinent findings in HPI.    Impression / Plan     1. Diabetes Mellitus: Type 1  good control. Very happy with her glycemic control since using exercise mode. She is having lows at night, she will confirm with glucometer. We made the following changes:  - 10 pm correction factor from 1:50 to 1:60. Change time of start of sleep from 10 pm to 9 pm.    I prescribed lantus 17 unit(s) as back up plan.       2. Diabetes Complications: none, reviewed her labs from 10/2024     3. Blood Pressure Management: Blood pressure is controlled. Currently is not on pharmacotherapy for this.   "   4.Lipid Management: Per the new ACC/CLINT/NHLBI guidelines, statins are recommended for individuals with diabetes aged 40-75 with LDL  without ASCVD, and for any individual with ASCVD. LDL above goal. she is ok with increasing the dose to 20 mg      5. Smoking Status: Patient Pt is smoke free..     6. Hypothyroidism: on lt4 125 mcg daily. Last sth wnl on 5/2024.      Review of the result(s) of each unique test - A1c and diabetes related labs.            Test and/or medications prescribed today:  No orders of the defined types were placed in this encounter.        Follow up: prefers 6 m follow-up     The longitudinal plan of care for the diagnosis(es)/condition(s) as documented were addressed during this visit. Due to the added complexity in care, I will continue to support Floresita in the subsequent management and with ongoing continuity of care.        Hermelindo Wesley MD  Endocrinology, Diabetes and Metabolism  [facility]      Again, thank you for allowing me to participate in the care of your patient.        Sincerely,        Hermelindo Wesley MD

## 2024-11-05 ENCOUNTER — TELEPHONE (OUTPATIENT)
Dept: ENDOCRINOLOGY | Facility: CLINIC | Age: 54
End: 2024-11-05
Payer: COMMERCIAL

## 2024-11-05 NOTE — TELEPHONE ENCOUNTER
Left Voicemail (1st Attempt) for the patient to call back and schedule the following:    Appointment type: return  Provider: dr. navarro  Return date: 5/4/2025  Specialty phone number: [phone]   Additonal Notes:  Return in about 6 months (around 5/4/2025).      [facility]   Orthopedics, Podiatry, Sports Medicine, Ent ,Eye , Audiology, Adult Endocrine & Diabetes, Nutrition & Medication Therapy Management Specialties   [facility]

## 2024-12-22 ENCOUNTER — HEALTH MAINTENANCE LETTER (OUTPATIENT)
Age: 54
End: 2024-12-22

## 2024-12-26 DIAGNOSIS — F34.1 DYSTHYMIC DISORDER: ICD-10-CM

## 2024-12-30 RX ORDER — FLUOXETINE 10 MG/1
CAPSULE ORAL
Qty: 90 CAPSULE | Refills: 0 | Status: SHIPPED | OUTPATIENT
Start: 2024-12-30

## 2024-12-30 NOTE — TELEPHONE ENCOUNTER
[facility] sent Rx request for the following:      Requested Prescriptions   Pending Prescriptions Disp Refills    FLUoxetine (PROZAC) 10 MG capsule [Pharmacy Med Name: fluoxetine 10 mg capsule] 90 capsule 4     Sig: Take 1 capsule by mouth daily with 20 mg capsule. Total daily dose = 30 mg.   Last Prescription Date:   10/4/23  Last Fill Qty/Refills:         90, R-4      SSRIs Protocol Failed - 12/30/2024  1:25 PM        Failed - PHQ-9 score less than 5 in past 6 months     Please review last PHQ-9 score.        FLUoxetine (PROZAC) 20 MG capsule [Pharmacy Med Name: fluoxetine 20 mg capsule] 90 capsule 4     Sig: Take 1 capsule by mouth daily with 10 mg capsule. Total daily dose = 30 mg.   Last Prescription Date:   10/4/23  Last Fill Qty/Refills:         90, R-4      SSRIs Protocol Failed - 12/30/2024  1:25 PM        Failed - PHQ-9 score less than 5 in past 6 months     Please review last PHQ-9 score.      Last Office Visit:              10/4/23   Future Office visit:           None    Pt due for annual exam. Routing to provider for refill consideration. Routing to Unit scheduling pool, to assist Pt in scheduling appointment. Unable to complete prescription refill per RN Medication Refill Policy. Francheska Veliz RN .............. 12/30/2024  1:36 PM

## 2025-01-03 DIAGNOSIS — E06.3 HYPOTHYROIDISM DUE TO HASHIMOTO'S THYROIDITIS: ICD-10-CM

## 2025-01-06 NOTE — TELEPHONE ENCOUNTER
[facility] sent Rx request for the following:      Requested Prescriptions   Pending Prescriptions Disp Refills    levothyroxine (SYNTHROID/LEVOTHROID) 125 MCG tablet 90 tablet 1     Sig: Take 1 tablet (125 mcg) by mouth every morning (before breakfast). Increase in dose   Last Prescription Date:   5/29/24  Last Fill Qty/Refills:         90, R-1    Last Office Visit:              10/4/23   Future Office visit:            Future Appointments 1/6/2025 - 7/5/2025        Date Visit Type Length Department Provider     1/30/2025  3:45 PM PREVENTATIVE ADULT 30 min [facility] Gustavo Alvarez MD    Location Instructions:     [facility] is located west of [facility] [address] and [address] of [facility] [address].              5/12/2025  1:00 PM RETURN DIABETES 30 min [facility] Hermelindo Jaffe MD                   Unable to complete prescription refill per RN Medication Refill Policy. Francheska Veliz RN .............. 1/6/2025  2:01 PM

## 2025-01-06 NOTE — TELEPHONE ENCOUNTER
Please assist patient in scheduling an appointment.  She needs to have labs completed for further refills.

## 2025-01-15 RX ORDER — LEVOTHYROXINE SODIUM 125 UG/1
125 TABLET ORAL
Qty: 90 TABLET | Refills: 0 | Status: SHIPPED | OUTPATIENT
Start: 2025-01-15

## 2025-01-28 NOTE — MR AVS SNAPSHOT
After Visit Summary   9/7/2018    Floresita Hill    MRN: [id number]           Patient Information     Date Of Birth          1970        Visit Information        Provider Department      9/7/2018 9:00 AM Stephani Chowdhury CNP [facility]        Today's Diagnoses     UTI symptoms    -  1       Follow-ups after your visit        Who to contact     If you have questions or need follow up information about today's clinic visit or your schedule please contact [facility] directly at [phone].  Normal or non-critical lab and imaging results will be communicated to you by Gridstorehart, letter or phone within 4 business days after the clinic has received the results. If you do not hear from us within 7 days, please contact the clinic through [employer] or phone. If you have a critical or abnormal lab result, we will notify you by phone as soon as possible.  Submit refill requests through [employer] or call your pharmacy and they will forward the refill request to us. Please allow 3 business days for your refill to be completed.          Additional Information About Your Visit        MyChart Information     [employer] gives you secure access to your electronic health record. If you see a primary care provider, you can also send messages to your care team and make appointments. If you have questions, please call your primary care clinic.  If you do not have a primary care provider, please call [phone] and they will assist you.        Care EveryWhere ID     This is your Care EveryWhere ID. This could be used by other organizations to access your [facility] medical records  [id number]        Your Vitals Were     Pulse Breastfeeding?                80 No           Blood Pressure from Last 3 Encounters:   09/07/18 122/82   05/24/18 117/62   05/24/18 144/76    Weight from Last 3 Encounters:   05/24/18 172 lb 3.2 oz (78.1 kg)   03/02/18 169 lb (76.7 kg)  "  10/26/17 161 lb (73 kg)              We Performed the Following     Urinalysis w Reflex Microscopic If Positive        Primary Care Provider Fax #    Physician No Ref-Primary [phone]       No address on file        Equal Access to Services     SILVINO CHILDSFRANKI : Milana york rhoda leydi Gabriel, wadalyda luqadaha, qaybta kaalmada blair, yessy smithtroy elmo. So [facility] [phone].    ATENCIÓN: Si habla español, tiene a vega disposición servicios gratuitos de asistencia lingüística. Llame al [phone].    We comply with applicable federal civil rights laws and [place] laws. We do not discriminate on the basis of race, color, national origin, age, disability, sex, sexual orientation, or gender identity.            Thank you!     Thank you for choosing [facility] AND [facility]  for your care. Our goal is always to provide you with excellent care. Hearing back from our patients is one way we can continue to improve our services. Please take a few minutes to complete the written survey that you may receive in the mail after your visit with us. Thank you!             Your Updated Medication List - Protect others around you: Learn how to safely use, store and throw away your medicines at www.disposemymeds.org.          This list is accurate as of 9/7/18 10:05 AM.  Always use your most recent med list.                   Brand Name Dispense Instructions for use Diagnosis    * ACCU-CHEK JESICA PLUS test strip   Generic drug:  blood glucose monitoring      USE ONE STRIP TO CHECK GLUCOSE FIVE TIMES DAILY.        * blood glucose monitoring test strip    no brand specified    450 strip    Use to test blood sugars 5 times daily with pump in Vitro 90 days    Type 1 diabetes mellitus without complication (H)       aspirin 81 MG EC tablet      Take 81 mg by mouth daily with food        B-D INSULIN SYRINGE ULTRAFINE 29G X 1/2\" 0.5 ML   Generic drug:  insulin syringe-needle U-100      For administering " insulin at home.        FLUoxetine 20 MG capsule    PROzac    90 capsule    Take 1 capsule (20 mg) by mouth every morning    Dysthymic disorder       insulin glargine 100 UNIT/ML injection    LANTUS    3 vial    Inject 20 Units Subcutaneous At Bedtime Before bedtime    Type 1 diabetes mellitus without complication (H)       levothyroxine 100 MCG tablet    SYNTHROID/LEVOTHROID    90 tablet    Take 1 tablet (100 mcg) by mouth every morning (before breakfast)    Hashimoto's thyroiditis       NovoLOG VIAL 100 UNITS/ML injection   Generic drug:  insulin aspart     60 mL    per pump    Type 1 diabetes mellitus without complication (H)       simvastatin 10 MG tablet    ZOCOR    90 tablet    Take 1 tablet (10 mg) by mouth At Bedtime    Type 1 diabetes mellitus without complication (H)       [employer] BLOOD GLUCOSE MONITOR w/Device Kit      Dispense glucose meter, test strips and lancets covered by the patient insurance. Test 5 times per day. 3 month supply with 4 refills.        triamcinolone 0.1 % cream    KENALOG          * Notice:  This list has 2 medication(s) that are the same as other medications prescribed for you. Read the directions carefully, and ask your doctor or other care provider to review them with you.       Detail Level: Zone Render In Strict Bullet Format?: No Plan: Avoid long hot steamy showers, moisturizer several times a day. Discussed treatment options topical steroids, systemic medications. Patient elects Dupixant and paperwork started Continue Regimen: Triamcinolone cream twice daily as needed

## 2025-02-18 DIAGNOSIS — E10.9 TYPE 1 DIABETES MELLITUS WITHOUT COMPLICATION (H): ICD-10-CM

## 2025-02-24 ENCOUNTER — MYC REFILL (OUTPATIENT)
Dept: ENDOCRINOLOGY | Facility: CLINIC | Age: 55
End: 2025-02-24
Payer: COMMERCIAL

## 2025-02-24 DIAGNOSIS — E10.9 TYPE 1 DIABETES MELLITUS WITHOUT COMPLICATION (H): ICD-10-CM

## 2025-02-25 RX ORDER — ACYCLOVIR 400 MG/1
TABLET ORAL
Qty: 3 EACH | Refills: 5 | Status: SHIPPED | OUTPATIENT
Start: 2025-02-25

## 2025-02-25 RX ORDER — ACYCLOVIR 400 MG/1
TABLET ORAL
Refills: 5 | OUTPATIENT
Start: 2025-02-25

## 2025-02-25 NOTE — TELEPHONE ENCOUNTER
Signed Today (2/25/2025):   Continuous Glucose Sensor (DEXCOM G7 SENSOR) MISC   Sig: Change every 10 days.   Disp: 3 each    Refills: 5   Signed by: Mary aMria PA-C     Refill Decision:  Refused. Duplicate request, addressed in alternate encounter.

## 2025-03-13 ENCOUNTER — OFFICE VISIT (OUTPATIENT)
Dept: FAMILY MEDICINE | Facility: OTHER | Age: 55
End: 2025-03-13
Attending: STUDENT IN AN ORGANIZED HEALTH CARE EDUCATION/TRAINING PROGRAM
Payer: COMMERCIAL

## 2025-03-13 ENCOUNTER — HOSPITAL ENCOUNTER (OUTPATIENT)
Dept: GENERAL RADIOLOGY | Facility: OTHER | Age: 55
Discharge: HOME | End: 2025-03-13
Attending: STUDENT IN AN ORGANIZED HEALTH CARE EDUCATION/TRAINING PROGRAM
Payer: COMMERCIAL

## 2025-03-13 VITALS
HEIGHT: 65 IN | TEMPERATURE: 97.8 F | HEART RATE: 78 BPM | RESPIRATION RATE: 20 BRPM | SYSTOLIC BLOOD PRESSURE: 133 MMHG | DIASTOLIC BLOOD PRESSURE: 80 MMHG | BODY MASS INDEX: 29.06 KG/M2 | OXYGEN SATURATION: 99 % | WEIGHT: 174.4 LBS

## 2025-03-13 DIAGNOSIS — R05.1 ACUTE COUGH: ICD-10-CM

## 2025-03-13 DIAGNOSIS — R05.1 ACUTE COUGH: Primary | ICD-10-CM

## 2025-03-13 PROCEDURE — 71046 X-RAY EXAM CHEST 2 VIEWS: CPT

## 2025-03-13 ASSESSMENT — PAIN SCALES - GENERAL: PAINLEVEL_OUTOF10: NO PAIN (0)

## 2025-03-13 NOTE — NURSING NOTE
"Chief Complaint   Patient presents with    Pharyngitis    Chest Pain    Headache     Chest tightness    Patient presents to the rapid clinic today for a cough, sore throat and a tight chest. Patient states symptoms started last Wednesday at (3-5-25) and has not gotten better.     Initial /80 (BP Location: Left arm, Patient Position: Sitting, Cuff Size: Adult Regular)   Pulse 78   Temp 97.8  F (36.6  C) (Temporal)   Resp 20   Ht 1.651 m (5' 5\")   Wt 79.1 kg (174 lb 6.4 oz)   LMP 11/01/2020 (Approximate)   SpO2 99%   BMI 29.02 kg/m   Estimated body mass index is 29.02 kg/m  as calculated from the following:    Height as of this encounter: 1.651 m (5' 5\").    Weight as of this encounter: 79.1 kg (174 lb 6.4 oz).  Medication Review: complete    The next two questions are to help us understand your food security.  If you are feeling you need any assistance in this area, we have resources available to support you today.          3/13/2025   SDOH- Food Insecurity   Within the past 12 months, did you worry that your food would run out before you got money to buy more? N   Within the past 12 months, did the food you bought just not last and you didn t have money to get more? N         Health Care Directive:  Patient does not have a Health Care Directive: Discussed advance care planning with patient; however, patient declined at this time.    Avelino Miramontes      "

## 2025-03-13 NOTE — PROGRESS NOTES
"  Assessment & Plan     (R05.1) Acute cough  (primary encounter diagnosis)    Comment: Acute cough x 1 week.  Fevers earlier this week.  No fevers today.  Oxygen of 99%, pulse of 78.  Chest x-ray was completed, no evidence of pneumonia at this time.    Plan: XR Chest 2 Views     Discussed findings with patient.  Discussed dexamethasone, Tessalon Perles.  Patient declined.  At this time, opting to continue her Mucinex, over-the-counter management.  Follow-up if not improving.  Return to rapid clinic or ER if worsening or changing.  She is comfortable and agreeable with this plan.    Nikolai Canas is a 54 year old, presenting for the following health issues:  Pharyngitis, Chest Pain, and Headache (Chest tightness )    HPI     Patient presents today with concerns of sore throat, chest burning, cough, headaches. She notes symptoms started about a week ago. She did have a fever three days ago of up to 102F, but has felt like there have been no further fevers since. She has been using Mucinex. No medications today. She felt like the chest burning started today which caused her to be evaluated.       Review of Systems  Constitutional, HEENT, cardiovascular, pulmonary, gi and gu systems are negative, except as otherwise noted.        Objective    /80 (BP Location: Left arm, Patient Position: Sitting, Cuff Size: Adult Regular)   Pulse 78   Temp 97.8  F (36.6  C) (Temporal)   Resp 20   Ht 1.651 m (5' 5\")   Wt 79.1 kg (174 lb 6.4 oz)   LMP 11/01/2020 (Approximate)   SpO2 99%   BMI 29.02 kg/m    Body mass index is 29.02 kg/m .    Physical Exam   GENERAL: alert and no distress  EYES: Eyes grossly normal to inspection, PERRL and conjunctivae and sclerae normal  HENT: ear canals and TM's normal, nose and mouth without ulcers or lesions  NECK: no adenopathy, no asymmetry, masses, or scars  RESP: slightly diffuse coarse sounds sporadically heard throughout lung fields, no wheezes, rales, rhonchi  CV: regular " rate and rhythm, normal S1 S2, no S3 or S4, no murmur, click or rub, no peripheral edema  MS: no gross musculoskeletal defects noted, no edema    Results for orders placed or performed during the hospital encounter of 03/13/25   XR Chest 2 Views     Status: None    Narrative    Exam:  XR CHEST 2 VIEWS    HISTORY: cough, congestion; Acute cough.    COMPARISON:  10/14/2018    FINDINGS:     The cardiomediastinal contours are normal.      No focal consolidation, effusion, or pneumothorax.      No acute osseous abnormality.       Impression    IMPRESSION:      No acute cardiopulmonary process.      LEXUS RICHARD MD         SYSTEM ID:  [id number]           Signed Electronically by: Cari Mendoza PA-C

## 2025-04-12 ENCOUNTER — HEALTH MAINTENANCE LETTER (OUTPATIENT)
Age: 55
End: 2025-04-12

## 2025-06-28 ENCOUNTER — MYC MEDICAL ADVICE (OUTPATIENT)
Dept: FAMILY MEDICINE | Facility: OTHER | Age: 55
End: 2025-06-28
Payer: COMMERCIAL

## 2025-06-28 DIAGNOSIS — M21.41 PES PLANUS OF RIGHT FOOT: ICD-10-CM

## 2025-06-28 DIAGNOSIS — M21.42 PES PLANUS OF LEFT FOOT: Primary | ICD-10-CM

## 2025-06-30 NOTE — TELEPHONE ENCOUNTER
Order and face sheet faxed to:     [facility]  Attn:  Jae Tovar  Fax [phone].     Patient update on MyChart.    Stephani Manning RN on 6/30/2025 at 11:36 AM

## 2025-06-30 NOTE — TELEPHONE ENCOUNTER
Requesting Orthotic Referral for [facility].     Ernesto'd up.     Route back after signing so I can fax in.      Stephani Manning RN on 6/30/2025 at 11:01 AM

## 2025-07-18 ENCOUNTER — TRANSFERRED RECORDS (OUTPATIENT)
Dept: HEALTH INFORMATION MANAGEMENT | Facility: OTHER | Age: 55
End: 2025-07-18
Payer: COMMERCIAL

## 2025-07-18 LAB — RETINOPATHY: NORMAL

## 2025-08-19 DIAGNOSIS — E10.9 TYPE 1 DIABETES MELLITUS WITHOUT COMPLICATION (H): Primary | ICD-10-CM

## 2025-08-20 ENCOUNTER — PATIENT OUTREACH (OUTPATIENT)
Dept: CARE COORDINATION | Facility: CLINIC | Age: 55
End: 2025-08-20
Payer: COMMERCIAL

## 2025-08-29 ENCOUNTER — HOSPITAL ENCOUNTER (OUTPATIENT)
Dept: MAMMOGRAPHY | Facility: OTHER | Age: 55
Discharge: HOME | End: 2025-08-29
Attending: STUDENT IN AN ORGANIZED HEALTH CARE EDUCATION/TRAINING PROGRAM | Admitting: STUDENT IN AN ORGANIZED HEALTH CARE EDUCATION/TRAINING PROGRAM
Payer: COMMERCIAL

## 2025-08-29 DIAGNOSIS — Z12.31 VISIT FOR SCREENING MAMMOGRAM: ICD-10-CM

## 2025-08-29 PROCEDURE — 77067 SCR MAMMO BI INCL CAD: CPT

## (undated) DEVICE — ENDO KIT COMPLIANCE DYKENDOCMPLY

## (undated) DEVICE — ENDO BRUSH CHANNEL MASTER CLEANING 2-4.2MM BW-412T

## (undated) DEVICE — SOL WATER 1500ML

## (undated) DEVICE — TUBING SUCTION 10'X3/16" N510

## (undated) DEVICE — SUCTION MANIFOLD NEPTUNE 2 SYS 4 PORT 0702-020-000

## (undated) RX ORDER — ONDANSETRON 4 MG/1
TABLET, ORALLY DISINTEGRATING ORAL
Status: DISPENSED
Start: 2018-05-24

## (undated) RX ORDER — LIDOCAINE HYDROCHLORIDE 20 MG/ML
INJECTION, SOLUTION EPIDURAL; INFILTRATION; INTRACAUDAL; PERINEURAL
Status: DISPENSED
Start: 2019-05-24

## (undated) RX ORDER — LIDOCAINE HYDROCHLORIDE 10 MG/ML
INJECTION, SOLUTION INFILTRATION; PERINEURAL
Status: DISPENSED
Start: 2019-04-12

## (undated) RX ORDER — ACETAMINOPHEN 500 MG
TABLET ORAL
Status: DISPENSED
Start: 2018-05-24

## (undated) RX ORDER — PROPOFOL 10 MG/ML
INJECTION, EMULSION INTRAVENOUS
Status: DISPENSED
Start: 2019-05-24

## (undated) RX ORDER — LIDOCAINE HYDROCHLORIDE 10 MG/ML
INJECTION, SOLUTION EPIDURAL; INFILTRATION; INTRACAUDAL; PERINEURAL
Status: DISPENSED
Start: 2019-05-24

## (undated) RX ORDER — LIDOCAINE HYDROCHLORIDE 10 MG/ML
INJECTION, SOLUTION EPIDURAL; INFILTRATION; INTRACAUDAL; PERINEURAL
Status: DISPENSED
Start: 2018-05-24

## (undated) RX ORDER — LIDOCAINE HYDROCHLORIDE AND EPINEPHRINE 10; 10 MG/ML; UG/ML
INJECTION, SOLUTION INFILTRATION; PERINEURAL
Status: DISPENSED
Start: 2019-04-12

## (undated) RX ORDER — CEFTRIAXONE SODIUM 1 G
VIAL (EA) INJECTION
Status: DISPENSED
Start: 2018-05-24